# Patient Record
Sex: FEMALE | Race: WHITE | Employment: PART TIME | URBAN - METROPOLITAN AREA
[De-identification: names, ages, dates, MRNs, and addresses within clinical notes are randomized per-mention and may not be internally consistent; named-entity substitution may affect disease eponyms.]

---

## 2017-08-17 ENCOUNTER — HOSPITAL ENCOUNTER (EMERGENCY)
Age: 57
Discharge: HOME | End: 2017-08-17
Attending: EMERGENCY MEDICINE | Admitting: EMERGENCY MEDICINE
Payer: COMMERCIAL

## 2017-08-17 ENCOUNTER — APPOINTMENT (OUTPATIENT)
Dept: GENERAL RADIOLOGY | Age: 57
End: 2017-08-17
Attending: EMERGENCY MEDICINE
Payer: COMMERCIAL

## 2017-08-17 VITALS
DIASTOLIC BLOOD PRESSURE: 74 MMHG | HEIGHT: 62 IN | OXYGEN SATURATION: 100 % | BODY MASS INDEX: 23.92 KG/M2 | SYSTOLIC BLOOD PRESSURE: 123 MMHG | WEIGHT: 130 LBS | TEMPERATURE: 98.5 F | HEART RATE: 93 BPM | RESPIRATION RATE: 18 BRPM

## 2017-08-17 DIAGNOSIS — J20.9 ACUTE BRONCHITIS, UNSPECIFIED ORGANISM: Primary | ICD-10-CM

## 2017-08-17 LAB
ALBUMIN SERPL-MCNC: 3.6 G/DL (ref 3.5–5)
ALBUMIN/GLOB SERPL: 1 {RATIO} (ref 1.1–2.2)
ALP SERPL-CCNC: 104 U/L (ref 45–117)
ALT SERPL-CCNC: 19 U/L (ref 12–78)
ANION GAP SERPL CALC-SCNC: 9 MMOL/L (ref 5–15)
AST SERPL-CCNC: 16 U/L (ref 15–37)
BASOPHILS # BLD: 0.1 K/UL (ref 0–0.1)
BASOPHILS NFR BLD: 1 % (ref 0–1)
BILIRUB SERPL-MCNC: 0.2 MG/DL (ref 0.2–1)
BUN SERPL-MCNC: 15 MG/DL (ref 6–20)
BUN/CREAT SERPL: 16 (ref 12–20)
CALCIUM SERPL-MCNC: 8.8 MG/DL (ref 8.5–10.1)
CHLORIDE SERPL-SCNC: 103 MMOL/L (ref 97–108)
CO2 SERPL-SCNC: 27 MMOL/L (ref 21–32)
CREAT SERPL-MCNC: 0.94 MG/DL (ref 0.55–1.02)
EOSINOPHIL # BLD: 0.2 K/UL (ref 0–0.4)
EOSINOPHIL NFR BLD: 2 % (ref 0–7)
ERYTHROCYTE [DISTWIDTH] IN BLOOD BY AUTOMATED COUNT: 13.5 % (ref 11.5–14.5)
GLOBULIN SER CALC-MCNC: 3.5 G/DL (ref 2–4)
GLUCOSE SERPL-MCNC: 123 MG/DL (ref 65–100)
HCT VFR BLD AUTO: 41.3 % (ref 35–47)
HGB BLD-MCNC: 14.2 G/DL (ref 11.5–16)
LYMPHOCYTES # BLD: 2.6 K/UL (ref 0.8–3.5)
LYMPHOCYTES NFR BLD: 35 % (ref 12–49)
MCH RBC QN AUTO: 29.3 PG (ref 26–34)
MCHC RBC AUTO-ENTMCNC: 34.4 G/DL (ref 30–36.5)
MCV RBC AUTO: 85.3 FL (ref 80–99)
MONOCYTES # BLD: 0.7 K/UL (ref 0–1)
MONOCYTES NFR BLD: 9 % (ref 5–13)
NEUTS SEG # BLD: 3.9 K/UL (ref 1.8–8)
NEUTS SEG NFR BLD: 53 % (ref 32–75)
PLATELET # BLD AUTO: 246 K/UL (ref 150–400)
PLATELET COMMENTS,PCOM: NORMAL
POTASSIUM SERPL-SCNC: 3.5 MMOL/L (ref 3.5–5.1)
PROT SERPL-MCNC: 7.1 G/DL (ref 6.4–8.2)
RBC # BLD AUTO: 4.84 M/UL (ref 3.8–5.2)
RBC MORPH BLD: NORMAL
SODIUM SERPL-SCNC: 139 MMOL/L (ref 136–145)
WBC # BLD AUTO: 7.5 K/UL (ref 3.6–11)
WBC MORPH BLD: NORMAL

## 2017-08-17 PROCEDURE — 74011250636 HC RX REV CODE- 250/636: Performed by: EMERGENCY MEDICINE

## 2017-08-17 PROCEDURE — 99285 EMERGENCY DEPT VISIT HI MDM: CPT

## 2017-08-17 PROCEDURE — 94640 AIRWAY INHALATION TREATMENT: CPT

## 2017-08-17 PROCEDURE — 74011250637 HC RX REV CODE- 250/637: Performed by: EMERGENCY MEDICINE

## 2017-08-17 PROCEDURE — 93005 ELECTROCARDIOGRAM TRACING: CPT

## 2017-08-17 PROCEDURE — 96361 HYDRATE IV INFUSION ADD-ON: CPT

## 2017-08-17 PROCEDURE — 85025 COMPLETE CBC W/AUTO DIFF WBC: CPT | Performed by: EMERGENCY MEDICINE

## 2017-08-17 PROCEDURE — 96374 THER/PROPH/DIAG INJ IV PUSH: CPT

## 2017-08-17 PROCEDURE — 80053 COMPREHEN METABOLIC PANEL: CPT | Performed by: EMERGENCY MEDICINE

## 2017-08-17 PROCEDURE — 36415 COLL VENOUS BLD VENIPUNCTURE: CPT | Performed by: EMERGENCY MEDICINE

## 2017-08-17 PROCEDURE — 77030013140 HC MSK NEB VYRM -A

## 2017-08-17 PROCEDURE — 71020 XR CHEST PA LAT: CPT

## 2017-08-17 PROCEDURE — 74011000250 HC RX REV CODE- 250: Performed by: EMERGENCY MEDICINE

## 2017-08-17 RX ORDER — BENZONATATE 100 MG/1
100 CAPSULE ORAL
Qty: 21 CAP | Refills: 0 | Status: SHIPPED | OUTPATIENT
Start: 2017-08-17 | End: 2017-08-23

## 2017-08-17 RX ORDER — GUAIFENESIN 100 MG/5ML
200 SOLUTION ORAL
Status: COMPLETED | OUTPATIENT
Start: 2017-08-17 | End: 2017-08-17

## 2017-08-17 RX ORDER — PREDNISONE 20 MG/1
60 TABLET ORAL DAILY
Qty: 15 TAB | Refills: 0 | Status: SHIPPED | OUTPATIENT
Start: 2017-08-17 | End: 2017-08-23

## 2017-08-17 RX ORDER — METHYLPREDNISOLONE 4 MG/1
TABLET ORAL
COMMUNITY
End: 2017-08-17

## 2017-08-17 RX ORDER — IPRATROPIUM BROMIDE AND ALBUTEROL SULFATE 2.5; .5 MG/3ML; MG/3ML
3 SOLUTION RESPIRATORY (INHALATION)
Status: COMPLETED | OUTPATIENT
Start: 2017-08-17 | End: 2017-08-17

## 2017-08-17 RX ORDER — ALBUTEROL SULFATE 90 UG/1
2 AEROSOL, METERED RESPIRATORY (INHALATION)
Qty: 1 INHALER | Refills: 0 | Status: SHIPPED | OUTPATIENT
Start: 2017-08-17

## 2017-08-17 RX ORDER — ALBUTEROL SULFATE 0.83 MG/ML
SOLUTION RESPIRATORY (INHALATION) ONCE
COMMUNITY
End: 2017-08-20

## 2017-08-17 RX ADMIN — GUAIFENESIN 200 MG: 100 SOLUTION ORAL at 16:56

## 2017-08-17 RX ADMIN — IPRATROPIUM BROMIDE AND ALBUTEROL SULFATE 3 ML: .5; 3 SOLUTION RESPIRATORY (INHALATION) at 17:34

## 2017-08-17 RX ADMIN — IPRATROPIUM BROMIDE AND ALBUTEROL SULFATE 3 ML: .5; 3 SOLUTION RESPIRATORY (INHALATION) at 16:56

## 2017-08-17 RX ADMIN — METHYLPREDNISOLONE SODIUM SUCCINATE 125 MG: 125 INJECTION, POWDER, FOR SOLUTION INTRAMUSCULAR; INTRAVENOUS at 16:56

## 2017-08-17 RX ADMIN — SODIUM CHLORIDE 1000 ML: 900 INJECTION, SOLUTION INTRAVENOUS at 17:24

## 2017-08-17 NOTE — ED PROVIDER NOTES
HPI Comments: 60 y.o. female with past medical history significant for anxiety and depression who presents ambulatory from home with chief complaint of cough. Pt reports 10-day history of worsening SOB and unproductive cough with intermittent fevers. Pt states cough and SOB are worse outside. Pt states her throat is sore secondary to her cough. Pt's highest documented fever was 101. Pt was evaluated twice by her PCP who initially started her on a z-garrison that she completed and most recently prescribed a Medrol dose pack 3 days ago. Pt has been compliant with the Medrol dose pack in addition to her rescue inhaler. Pt used her inhaler 90 minutes prior to arrival. Of note, pt was informed today that her chest x-ray this week was normal (x-ray performed at an outside facility whose records are not available). 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 Pt admits to increased stress at home related to her  leaving her and looming eviction. Pt notes her current place of residence is \"kept at 80 degrees which makes it difficult to breathe\". Pt has local family and friends she states she can stay with in a worst case scenario. Pt denies history of recent admission, PE/DVT, recent long travel, and CHF. Pertinent negatives include leg swelling. There are no other acute medical concerns at this time. Social hx: current every day tobacco smoker; uses EtOH rarely  PCP: Linda Yip MD    Note written by Orlando Johnson, as dictated by Chyna Villeda MD 4:40 PM        The history is provided by the patient. Past Medical History:   Diagnosis Date    Anxiety     Depression        Past Surgical History:   Procedure Laterality Date    HX  SECTION      HX OTHER SURGICAL      knee surgery          No family history on file.     Social History     Social History    Marital status: UNKNOWN     Spouse name: N/A    Number of children: N/A    Years of education: N/A     Occupational History    Not on file. Social History Main Topics    Smoking status: Current Every Day Smoker     Packs/day: 0.50    Smokeless tobacco: Not on file    Alcohol use Yes      Comment: once a month    Drug use: Not on file    Sexual activity: Not on file     Other Topics Concern    Not on file     Social History Narrative    60year old   female admitted for rage episodes and depressed mood and severe intepersonal sensitivity. Pt. Has been on Ativan 1 mg po rip prn, which is probably contributing to her mood, poor anger mangement and anxiety. Pt now lives in a rented room after her marriaGE BROKE UP. sHE IS UNEMPLOYED. ALLERGIES: Vicodin [hydrocodone-acetaminophen]    Review of Systems   Constitutional: Negative for fever. Respiratory: Positive for cough and shortness of breath. Cardiovascular: Negative for chest pain and leg swelling. Psychiatric/Behavioral: The patient is nervous/anxious. All other systems reviewed and are negative. There were no vitals filed for this visit. Physical Exam   Constitutional: She is oriented to person, place, and time. She appears well-developed and well-nourished. No distress. HENT:   Head: Normocephalic and atraumatic. Eyes: Conjunctivae are normal.   Neck: Normal range of motion. Cardiovascular: Regular rhythm, normal heart sounds and intact distal pulses. Exam reveals no friction rub. No murmur heard. tachycardic   Pulmonary/Chest: Effort normal. No respiratory distress. She has wheezes. She has no rales. She exhibits no tenderness. Scant inspiratory and expiratory wheezes b/l; + bronchospastic cough   Abdominal: Soft. Bowel sounds are normal. She exhibits no distension. There is no tenderness. There is no rebound and no guarding. Musculoskeletal: Normal range of motion. She exhibits no edema or tenderness. Neurological: She is alert and oriented to person, place, and time.  Coordination normal.   Skin: Skin is warm and dry. She is not diaphoretic. No pallor. Psychiatric:   Anxious, tearful   Nursing note and vitals reviewed. MDM  Number of Diagnoses or Management Options  Acute bronchitis, unspecified organism:   Diagnosis management comments: Cough bronchospasm- pna vs bronchitis advised smoking cessation no risk factor for dvt or PE       Amount and/or Complexity of Data Reviewed  Clinical lab tests: ordered and reviewed  Tests in the radiology section of CPT®: ordered and reviewed  Independent visualization of images, tracings, or specimens: yes (ekg)    Patient Progress  Patient progress: stable    ED Course       Procedures    EKG interpretation: (Preliminary)  Rhythm: sinus tachycardia; and regular . Rate (approx.): 115; Axis: normal; P wave: normal; QRS interval: normal ; ST/T wave: non-specific changes;      5:31 PM  Reassessed patient. Patient is awake, alert and on her iPad. She reports minimal improvement with Duo-Neb, solu-medrol, and robitussin. Feeling slightly better. Still with mild bronchospasm. Pt improved able to speak in full sentences with no distress though does appear anxious. Higher dose steroids to halt inflammation and follow up with pc. Per pt pcp wanted her to follow up with pulmonary and she is supposed to be getting an appointment. She is in no distress.  Advised smoking cessation and return if worsening sx

## 2017-08-17 NOTE — ED NOTES
Pt stated she is under a lot of stress d/t her  leaving her and her landlord is kicking her out of her rental.

## 2017-08-17 NOTE — ED NOTES
Pt \"cant breath, coughing\" started last week and went to her PCP and was placed on a z pack and prednisone. Pt c/o SOB and non productive cough 1.5 weeks. Also c/o fever on and off. She had a chest xray today and was told it was clear.

## 2017-08-17 NOTE — DISCHARGE INSTRUCTIONS
We hope that we have addressed all of your medical concerns. The examination and treatment you received in the Emergency Department were for an emergent problem and were not intended as complete care. It is important that you follow up with your healthcare provider(s) for ongoing care. If your symptoms worsen or do not improve as expected, and you are unable to reach your usual health care provider(s), you should return to the Emergency Department. Today's healthcare is undergoing tremendous change, and patient satisfaction surveys are one of the many tools to assess the quality of medical care. You may receive a survey from the [employer] regarding your experience in the Emergency Department. I hope that your experience has been completely positive, particularly the medical care that I provided. As such, please participate in the survey; anything less than excellent does not meet my expectations or intentions. [facility] and [facility] participate in nationally recognized quality of care measures. If your blood pressure is greater than 120/80, as reported below, we urge that you seek medical care to address the potential of high blood pressure, commonly known as hypertension. Hypertension can be hereditary or can be caused by certain medical conditions, pain, stress, or \"white coat syndrome. \"       Please make an appointment with your health care provider(s) for follow up of your Emergency Department visit. VITALS:   Patient Vitals for the past 8 hrs:   Temp Pulse Resp BP SpO2   08/17/17 1715 - (!) 112 18 - 96 %   08/17/17 1659 - (!) 103 14 - 99 %   08/17/17 1654 - (!) 115 20 - 95 %   08/17/17 1646 98.5 °F (36.9 °C) (!) 117 14 151/87 98 %          Thank you for allowing us to provide you with medical care today. We realize that you have many choices for your emergency care needs.   Please choose us in the future for any continued health care needs. Aspen Connell MD    [facility].   Office: [phone]            Recent Results (from the past 24 hour(s))   CBC WITH AUTOMATED DIFF    Collection Time: 08/17/17  4:56 PM   Result Value Ref Range    WBC 7.5 3.6 - 11.0 K/uL    RBC 4.84 3.80 - 5.20 M/uL    HGB 14.2 11.5 - 16.0 g/dL    HCT 41.3 35.0 - 47.0 %    MCV 85.3 80.0 - 99.0 FL    MCH 29.3 26.0 - 34.0 PG    MCHC 34.4 30.0 - 36.5 g/dL    RDW 13.5 11.5 - 14.5 %    PLATELET 821 441 - 713 K/uL    NEUTROPHILS PENDING %    LYMPHOCYTES PENDING %    MONOCYTES PENDING %    EOSINOPHILS PENDING %    BASOPHILS PENDING %    ABS. NEUTROPHILS PENDING K/UL    ABS. LYMPHOCYTES PENDING K/UL    ABS. MONOCYTES PENDING K/UL    ABS. EOSINOPHILS PENDING K/UL    ABS. BASOPHILS PENDING K/UL    DF PENDING    METABOLIC PANEL, COMPREHENSIVE    Collection Time: 08/17/17  4:56 PM   Result Value Ref Range    Sodium 139 136 - 145 mmol/L    Potassium 3.5 3.5 - 5.1 mmol/L    Chloride 103 97 - 108 mmol/L    CO2 27 21 - 32 mmol/L    Anion gap 9 5 - 15 mmol/L    Glucose 123 (H) 65 - 100 mg/dL    BUN 15 6 - 20 MG/DL    Creatinine 0.94 0.55 - 1.02 MG/DL    BUN/Creatinine ratio 16 12 - 20      GFR est AA >60 >60 ml/min/1.73m2    GFR est non-AA >60 >60 ml/min/1.73m2    Calcium 8.8 8.5 - 10.1 MG/DL    Bilirubin, total 0.2 0.2 - 1.0 MG/DL    ALT (SGPT) 19 12 - 78 U/L    AST (SGOT) 16 15 - 37 U/L    Alk. phosphatase 104 45 - 117 U/L    Protein, total 7.1 6.4 - 8.2 g/dL    Albumin 3.6 3.5 - 5.0 g/dL    Globulin 3.5 2.0 - 4.0 g/dL    A-G Ratio 1.0 (L) 1.1 - 2.2         Xr Chest Pa Lat    Result Date: 8/17/2017  CLINICAL HISTORY: Cough and dyspnea INDICATION: Cough COMPARISON: 2007 FINDINGS: PA and lateral views of the chest are obtained. The cardiopericardial silhouette is within normal limits. There is no pleural effusion, pneumothorax or focal consolidation present. IMPRESSION: No acute intrathoracic disease.           Bronchitis: Care Instructions  Your Care Instructions    Bronchitis is inflammation of the bronchial tubes, which carry air to the lungs. The tubes swell and produce mucus, or phlegm. The mucus and inflamed bronchial tubes make you cough. You may have trouble breathing. Most cases of bronchitis are caused by viruses like those that cause colds. Antibiotics usually do not help and they may be harmful. Bronchitis usually develops rapidly and lasts about 2 to 3 weeks in otherwise healthy people. Follow-up care is a key part of your treatment and safety. Be sure to make and go to all appointments, and call your doctor if you are having problems. It's also a good idea to know your test results and keep a list of the medicines you take. How can you care for yourself at home? · Take all medicines exactly as prescribed. Call your doctor if you think you are having a problem with your medicine. · Get some extra rest.  · Take an over-the-counter pain medicine, such as acetaminophen (Tylenol), ibuprofen (Advil, Motrin), or naproxen (Aleve) to reduce fever and relieve body aches. Read and follow all instructions on the label. · Do not take two or more pain medicines at the same time unless the doctor told you to. Many pain medicines have acetaminophen, which is Tylenol. Too much acetaminophen (Tylenol) can be harmful. · Take an over-the-counter cough medicine that contains dextromethorphan to help quiet a dry, hacking cough so that you can sleep. Avoid cough medicines that have more than one active ingredient. Read and follow all instructions on the label. · Breathe moist air from a humidifier, hot shower, or sink filled with hot water. The heat and moisture will thin mucus so you can cough it out. · Do not smoke. Smoking can make bronchitis worse. If you need help quitting, talk to your doctor about stop-smoking programs and medicines. These can increase your chances of quitting for good. When should you call for help?   Call 911 anytime you think you may need emergency care. For example, call if:  · You have severe trouble breathing. Call your doctor now or seek immediate medical care if:  · You have new or worse trouble breathing. · You cough up dark brown or bloody mucus (sputum). · You have a new or higher fever. · You have a new rash. Watch closely for changes in your health, and be sure to contact your doctor if:  · You cough more deeply or more often, especially if you notice more mucus or a change in the color of your mucus. · You are not getting better as expected. Where can you learn more? Go to http://elizabeth-ju.info/. Enter H333 in the search box to learn more about \"Bronchitis: Care Instructions. \"  Current as of: March 25, 2017  Content Version: 11.3  © 6028-1384 [employer]. Care instructions adapted under license by [employer] (which disclaims liability or warranty for this information). If you have questions about a medical condition or this instruction, always ask your healthcare professional. [address] any warranty or liability for your use of this information.

## 2017-08-17 NOTE — ED NOTES
Patient taken directly back to room 6 for EKG based on chief complaint. Primary nurse aware of patient's arrival to exam room.

## 2017-08-19 LAB
ATRIAL RATE: 115 BPM
CALCULATED P AXIS, ECG09: 68 DEGREES
CALCULATED R AXIS, ECG10: 46 DEGREES
CALCULATED T AXIS, ECG11: 63 DEGREES
DIAGNOSIS, 93000: NORMAL
P-R INTERVAL, ECG05: 172 MS
Q-T INTERVAL, ECG07: 318 MS
QRS DURATION, ECG06: 76 MS
QTC CALCULATION (BEZET), ECG08: 439 MS
VENTRICULAR RATE, ECG03: 115 BPM

## 2017-08-20 ENCOUNTER — HOSPITAL ENCOUNTER (INPATIENT)
Age: 57
LOS: 3 days | Discharge: HOME HEALTH | DRG: 190 | End: 2017-08-23
Attending: EMERGENCY MEDICINE | Admitting: INTERNAL MEDICINE
Payer: COMMERCIAL

## 2017-08-20 ENCOUNTER — APPOINTMENT (OUTPATIENT)
Dept: GENERAL RADIOLOGY | Age: 57
DRG: 190 | End: 2017-08-20
Attending: EMERGENCY MEDICINE
Payer: COMMERCIAL

## 2017-08-20 DIAGNOSIS — J44.1 ACUTE EXACERBATION OF CHRONIC OBSTRUCTIVE PULMONARY DISEASE (COPD) (HCC): Primary | ICD-10-CM

## 2017-08-20 PROBLEM — J44.9 COPD (CHRONIC OBSTRUCTIVE PULMONARY DISEASE) (HCC): Status: ACTIVE | Noted: 2017-08-20

## 2017-08-20 PROBLEM — E78.5 HYPERLIPIDEMIA: Status: ACTIVE | Noted: 2017-08-20

## 2017-08-20 LAB
ANION GAP SERPL CALC-SCNC: 9 MMOL/L (ref 5–15)
ARTERIAL PATENCY WRIST A: YES
BASE EXCESS BLDA CALC-SCNC: 1.9 MMOL/L
BASOPHILS # BLD: 0 K/UL (ref 0–0.1)
BASOPHILS NFR BLD: 0 % (ref 0–1)
BDY SITE: ABNORMAL
BUN SERPL-MCNC: 13 MG/DL (ref 6–20)
BUN/CREAT SERPL: 16 (ref 12–20)
CALCIUM SERPL-MCNC: 9.2 MG/DL (ref 8.5–10.1)
CHLORIDE SERPL-SCNC: 105 MMOL/L (ref 97–108)
CO2 SERPL-SCNC: 28 MMOL/L (ref 21–32)
CREAT SERPL-MCNC: 0.81 MG/DL (ref 0.55–1.02)
EOSINOPHIL # BLD: 0 K/UL (ref 0–0.4)
EOSINOPHIL NFR BLD: 0 % (ref 0–7)
ERYTHROCYTE [DISTWIDTH] IN BLOOD BY AUTOMATED COUNT: 13.4 % (ref 11.5–14.5)
FIO2 ON VENT: 21 %
GLUCOSE SERPL-MCNC: 114 MG/DL (ref 65–100)
HCO3 BLDA-SCNC: 24 MMOL/L (ref 22–26)
HCT VFR BLD AUTO: 43.4 % (ref 35–47)
HGB BLD-MCNC: 14.7 G/DL (ref 11.5–16)
LYMPHOCYTES # BLD: 1.2 K/UL (ref 0.8–3.5)
LYMPHOCYTES NFR BLD: 13 % (ref 12–49)
MCH RBC QN AUTO: 28.9 PG (ref 26–34)
MCHC RBC AUTO-ENTMCNC: 33.9 G/DL (ref 30–36.5)
MCV RBC AUTO: 85.3 FL (ref 80–99)
MONOCYTES # BLD: 0.7 K/UL (ref 0–1)
MONOCYTES NFR BLD: 7 % (ref 5–13)
NEUTS SEG # BLD: 7.8 K/UL (ref 1.8–8)
NEUTS SEG NFR BLD: 80 % (ref 32–75)
PCO2 BLDA: 32 MMHG (ref 35–45)
PH BLDA: 7.5 [PH] (ref 7.35–7.45)
PLATELET # BLD AUTO: 303 K/UL (ref 150–400)
PO2 BLDA: 62 MMHG (ref 80–100)
POTASSIUM SERPL-SCNC: 3.7 MMOL/L (ref 3.5–5.1)
RBC # BLD AUTO: 5.09 M/UL (ref 3.8–5.2)
SAO2 % BLD: 94 % (ref 92–97)
SAO2% DEVICE SAO2% SENSOR NAME: ABNORMAL
SERVICE CMNT-IMP: ABNORMAL
SODIUM SERPL-SCNC: 142 MMOL/L (ref 136–145)
SPECIMEN SITE: ABNORMAL
WBC # BLD AUTO: 9.7 K/UL (ref 3.6–11)

## 2017-08-20 PROCEDURE — 65270000029 HC RM PRIVATE

## 2017-08-20 PROCEDURE — 77030029684 HC NEB SM VOL KT MONA -A

## 2017-08-20 PROCEDURE — 74011250636 HC RX REV CODE- 250/636: Performed by: INTERNAL MEDICINE

## 2017-08-20 PROCEDURE — 74011250637 HC RX REV CODE- 250/637: Performed by: EMERGENCY MEDICINE

## 2017-08-20 PROCEDURE — 93005 ELECTROCARDIOGRAM TRACING: CPT

## 2017-08-20 PROCEDURE — 77030013140 HC MSK NEB VYRM -A

## 2017-08-20 PROCEDURE — 36415 COLL VENOUS BLD VENIPUNCTURE: CPT | Performed by: EMERGENCY MEDICINE

## 2017-08-20 PROCEDURE — 99284 EMERGENCY DEPT VISIT MOD MDM: CPT

## 2017-08-20 PROCEDURE — 36600 WITHDRAWAL OF ARTERIAL BLOOD: CPT | Performed by: EMERGENCY MEDICINE

## 2017-08-20 PROCEDURE — 94640 AIRWAY INHALATION TREATMENT: CPT

## 2017-08-20 PROCEDURE — 74011250637 HC RX REV CODE- 250/637: Performed by: INTERNAL MEDICINE

## 2017-08-20 PROCEDURE — 74011000250 HC RX REV CODE- 250: Performed by: INTERNAL MEDICINE

## 2017-08-20 PROCEDURE — 96375 TX/PRO/DX INJ NEW DRUG ADDON: CPT

## 2017-08-20 PROCEDURE — 96365 THER/PROPH/DIAG IV INF INIT: CPT

## 2017-08-20 PROCEDURE — 74011250636 HC RX REV CODE- 250/636: Performed by: EMERGENCY MEDICINE

## 2017-08-20 PROCEDURE — 71010 XR CHEST PORT: CPT

## 2017-08-20 PROCEDURE — 80048 BASIC METABOLIC PNL TOTAL CA: CPT | Performed by: EMERGENCY MEDICINE

## 2017-08-20 PROCEDURE — 74011000250 HC RX REV CODE- 250: Performed by: EMERGENCY MEDICINE

## 2017-08-20 PROCEDURE — 82803 BLOOD GASES ANY COMBINATION: CPT | Performed by: EMERGENCY MEDICINE

## 2017-08-20 PROCEDURE — 85025 COMPLETE CBC W/AUTO DIFF WBC: CPT | Performed by: EMERGENCY MEDICINE

## 2017-08-20 RX ORDER — MAGNESIUM SULFATE HEPTAHYDRATE 40 MG/ML
2 INJECTION, SOLUTION INTRAVENOUS
Status: COMPLETED | OUTPATIENT
Start: 2017-08-20 | End: 2017-08-20

## 2017-08-20 RX ORDER — GUAIFENESIN 600 MG/1
600 TABLET, EXTENDED RELEASE ORAL 2 TIMES DAILY
Status: DISCONTINUED | OUTPATIENT
Start: 2017-08-20 | End: 2017-08-23 | Stop reason: HOSPADM

## 2017-08-20 RX ORDER — ALBUTEROL SULFATE 0.83 MG/ML
2.5 SOLUTION RESPIRATORY (INHALATION)
Status: DISCONTINUED | OUTPATIENT
Start: 2017-08-20 | End: 2017-08-23 | Stop reason: HOSPADM

## 2017-08-20 RX ORDER — IBUPROFEN 200 MG
1 TABLET ORAL DAILY
Status: DISCONTINUED | OUTPATIENT
Start: 2017-08-21 | End: 2017-08-23 | Stop reason: HOSPADM

## 2017-08-20 RX ORDER — FLUTICASONE FUROATE AND VILANTEROL 100; 25 UG/1; UG/1
1 POWDER RESPIRATORY (INHALATION) DAILY
Status: DISCONTINUED | OUTPATIENT
Start: 2017-08-20 | End: 2017-08-23 | Stop reason: HOSPADM

## 2017-08-20 RX ORDER — IPRATROPIUM BROMIDE AND ALBUTEROL SULFATE 2.5; .5 MG/3ML; MG/3ML
3 SOLUTION RESPIRATORY (INHALATION)
Status: DISCONTINUED | OUTPATIENT
Start: 2017-08-20 | End: 2017-08-23 | Stop reason: HOSPADM

## 2017-08-20 RX ORDER — BENZONATATE 100 MG/1
100 CAPSULE ORAL
Status: DISCONTINUED | OUTPATIENT
Start: 2017-08-20 | End: 2017-08-23 | Stop reason: HOSPADM

## 2017-08-20 RX ORDER — IPRATROPIUM BROMIDE AND ALBUTEROL SULFATE 2.5; .5 MG/3ML; MG/3ML
3 SOLUTION RESPIRATORY (INHALATION)
Status: COMPLETED | OUTPATIENT
Start: 2017-08-20 | End: 2017-08-20

## 2017-08-20 RX ORDER — ZOLPIDEM TARTRATE 5 MG/1
5 TABLET ORAL
Status: DISCONTINUED | OUTPATIENT
Start: 2017-08-20 | End: 2017-08-23 | Stop reason: HOSPADM

## 2017-08-20 RX ORDER — SODIUM CHLORIDE 0.9 % (FLUSH) 0.9 %
5-10 SYRINGE (ML) INJECTION EVERY 8 HOURS
Status: DISCONTINUED | OUTPATIENT
Start: 2017-08-20 | End: 2017-08-23 | Stop reason: HOSPADM

## 2017-08-20 RX ORDER — IBUPROFEN 600 MG/1
600 TABLET ORAL
Status: COMPLETED | OUTPATIENT
Start: 2017-08-20 | End: 2017-08-20

## 2017-08-20 RX ORDER — DEXAMETHASONE SODIUM PHOSPHATE 10 MG/ML
10 INJECTION INTRAMUSCULAR; INTRAVENOUS
Status: COMPLETED | OUTPATIENT
Start: 2017-08-20 | End: 2017-08-20

## 2017-08-20 RX ORDER — CODEINE PHOSPHATE AND GUAIFENESIN 10; 100 MG/5ML; MG/5ML
5 SOLUTION ORAL
Status: DISCONTINUED | OUTPATIENT
Start: 2017-08-20 | End: 2017-08-21

## 2017-08-20 RX ORDER — IBUPROFEN 400 MG/1
400 TABLET ORAL
Status: DISCONTINUED | OUTPATIENT
Start: 2017-08-20 | End: 2017-08-23 | Stop reason: HOSPADM

## 2017-08-20 RX ORDER — SODIUM CHLORIDE 0.9 % (FLUSH) 0.9 %
5-10 SYRINGE (ML) INJECTION AS NEEDED
Status: DISCONTINUED | OUTPATIENT
Start: 2017-08-20 | End: 2017-08-23 | Stop reason: HOSPADM

## 2017-08-20 RX ORDER — ENOXAPARIN SODIUM 100 MG/ML
40 INJECTION SUBCUTANEOUS DAILY
Status: DISCONTINUED | OUTPATIENT
Start: 2017-08-20 | End: 2017-08-23 | Stop reason: HOSPADM

## 2017-08-20 RX ADMIN — METHYLPREDNISOLONE SODIUM SUCCINATE 40 MG: 40 INJECTION, POWDER, FOR SOLUTION INTRAMUSCULAR; INTRAVENOUS at 20:07

## 2017-08-20 RX ADMIN — IPRATROPIUM BROMIDE AND ALBUTEROL SULFATE 3 ML: .5; 3 SOLUTION RESPIRATORY (INHALATION) at 11:39

## 2017-08-20 RX ADMIN — Medication 10 ML: at 14:06

## 2017-08-20 RX ADMIN — SODIUM CHLORIDE 1000 ML: 900 INJECTION, SOLUTION INTRAVENOUS at 10:28

## 2017-08-20 RX ADMIN — IBUPROFEN 600 MG: 600 TABLET, FILM COATED ORAL at 10:43

## 2017-08-20 RX ADMIN — Medication 10 ML: at 20:11

## 2017-08-20 RX ADMIN — GUAIFENESIN AND CODEINE PHOSPHATE 5 ML: 100; 10 SOLUTION ORAL at 21:11

## 2017-08-20 RX ADMIN — IBUPROFEN 400 MG: 400 TABLET, FILM COATED ORAL at 20:45

## 2017-08-20 RX ADMIN — GUAIFENESIN 600 MG: 600 TABLET, EXTENDED RELEASE ORAL at 20:06

## 2017-08-20 RX ADMIN — DEXAMETHASONE SODIUM PHOSPHATE 10 MG: 10 INJECTION, SOLUTION INTRAMUSCULAR; INTRAVENOUS at 10:26

## 2017-08-20 RX ADMIN — IPRATROPIUM BROMIDE AND ALBUTEROL SULFATE 3 ML: .5; 3 SOLUTION RESPIRATORY (INHALATION) at 19:42

## 2017-08-20 RX ADMIN — IPRATROPIUM BROMIDE AND ALBUTEROL SULFATE 3 ML: .5; 3 SOLUTION RESPIRATORY (INHALATION) at 10:26

## 2017-08-20 RX ADMIN — METHYLPREDNISOLONE SODIUM SUCCINATE 40 MG: 40 INJECTION, POWDER, FOR SOLUTION INTRAMUSCULAR; INTRAVENOUS at 14:07

## 2017-08-20 RX ADMIN — MAGNESIUM SULFATE HEPTAHYDRATE 2 G: 40 INJECTION, SOLUTION INTRAVENOUS at 10:26

## 2017-08-20 RX ADMIN — FLUTICASONE FUROATE AND VILANTEROL TRIFENATATE 1 PUFF: 100; 25 POWDER RESPIRATORY (INHALATION) at 15:24

## 2017-08-20 RX ADMIN — ENOXAPARIN SODIUM 40 MG: 40 INJECTION SUBCUTANEOUS at 14:06

## 2017-08-20 RX ADMIN — IPRATROPIUM BROMIDE AND ALBUTEROL SULFATE 3 ML: .5; 3 SOLUTION RESPIRATORY (INHALATION) at 15:30

## 2017-08-20 RX ADMIN — GUAIFENESIN 600 MG: 600 TABLET, EXTENDED RELEASE ORAL at 14:07

## 2017-08-20 NOTE — ED PROVIDER NOTES
HPI Comments: 60 y.o. female with past medical history significant for anxiety, depression, diverticulitis, and hyperlipidemia who presents from home with chief complaint of shortness of breath. Patient was seen at [facility] on 17 for shortness of breath and received labs and a chest x-ray which were unremarkable. She was discharged with prednisone, albuterol, and tessalon perles. Patient complains of continued SOB which worsens upon exertion, cough without production, chest tightness, and wheezing. She states that her wheezing subsides after taking her albuterol but complains that it starts developing again 2-4 hours later. Patient denies having any chest pain or fever. She has a long history of smoking. Her brother  from COPD last year. There are no other acute medical concerns at this time. Social hx: everyday smoker, no EtOH use, no drug use. PCP: Jessa Gilbert MD    Note written by Orlando Baker, as dictated by Cristy Hickey MD 10:25 AM      The history is provided by the patient. No  was used. Past Medical History:   Diagnosis Date    Anxiety     Depression     Diverticulitis     Hyperlipemia        Past Surgical History:   Procedure Laterality Date    HX  SECTION      HX HEART CATHETERIZATION  2013    HX OTHER SURGICAL      knee surgery          History reviewed. No pertinent family history. Social History     Social History    Marital status: UNKNOWN     Spouse name: N/A    Number of children: N/A    Years of education: N/A     Occupational History    Not on file.      Social History Main Topics    Smoking status: Current Every Day Smoker     Packs/day: 0.50    Smokeless tobacco: Never Used    Alcohol use No    Drug use: No    Sexual activity: Not on file     Other Topics Concern    Not on file     Social History Narrative    60year old   female admitted for rage episodes and depressed mood and severe intepersonal sensitivity. Pt. Has been on Ativan 1 mg po rip prn, which is probably contributing to her mood, poor anger mangement and anxiety. Pt now lives in a rented room after her marriaGE BROKE UP. sHE IS UNEMPLOYED. ALLERGIES: Vicodin [hydrocodone-acetaminophen]    Review of Systems   Constitutional: Negative for chills and fever. HENT: Negative for ear pain and sore throat. Eyes: Negative for photophobia and pain. Respiratory: Positive for cough, chest tightness, shortness of breath and wheezing. Cardiovascular: Negative for chest pain and leg swelling. Gastrointestinal: Negative for abdominal pain, nausea and vomiting. Genitourinary: Negative for dysuria and flank pain. Musculoskeletal: Negative for back pain and neck pain. Skin: Negative for rash and wound. Neurological: Negative for dizziness, light-headedness and headaches. All other systems reviewed and are negative. Vitals:    08/20/17 1003   BP: (!) 168/139   Pulse: (!) 109   Resp: 15   Temp: 98.2 °F (36.8 °C)   SpO2: 96%   Weight: 59 kg (130 lb)   Height: 5' 2\" (1.575 m)            Physical Exam   Constitutional: She is oriented to person, place, and time. She appears well-developed and well-nourished. She appears distressed. HENT:   Head: Normocephalic and atraumatic. Eyes: Conjunctivae and EOM are normal. Pupils are equal, round, and reactive to light. Neck: Normal range of motion. Cardiovascular: Regular rhythm, normal heart sounds and intact distal pulses. Tachycardia present. No murmur heard. Pulmonary/Chest: No stridor. She is in respiratory distress. She has wheezes. Abdominal: Soft. Bowel sounds are normal. There is no tenderness. Musculoskeletal: Normal range of motion. She exhibits no edema or tenderness. Neurological: She is alert and oriented to person, place, and time. No cranial nerve deficit. Skin: Skin is warm and dry. She is not diaphoretic.    Psychiatric: She has a normal mood and affect. Nursing note and vitals reviewed. MDM  Number of Diagnoses or Management Options  Acute exacerbation of chronic obstructive pulmonary disease (COPD) ([facility] [address]):   Diagnosis management comments: Patient with wheezing still - not improving with home meds (albuterol, steroids)   Patient advised to stop smoking, has poor air-conditioning where she lives. Will admit for suspected COPD exacerbation - may need pulmonary consult while in-hospital       Amount and/or Complexity of Data Reviewed  Clinical lab tests: ordered and reviewed  Tests in the radiology section of CPT®: ordered and reviewed  Discuss the patient with other providers: yes (Dr. Carol Pascual - hospitalist to admit)  Independent visualization of images, tracings, or specimens: yes    Patient Progress  Patient progress: stable    ED Course       Procedures   ED EKG interpretation:  Rhythm: sinus tachycardia; and regular . Rate (approx.): 104. Note written by Orlando Mallory, as dictated by Ivana Winter MD 10:06 AM    CONSULT NOTE:  11:49 Roosevelt Peraza MD spoke with Dr. Mike Blevins, Consult for Hospitalist.  Discussed available diagnostic tests and clinical findings. He is in agreement with care plans as outlined. Dr. Carol Pascual will see the patient. EXAM:  XR CHEST PORT     INDICATION:  chest pain     COMPARISON:  8/17/2017     FINDINGS:     A portable AP radiograph of the chest was obtained at 10:26 hours. The patient  is on a cardiac monitor. The lungs are clear. The cardiac and mediastinal  contours and pulmonary vascularity are normal.  The bones and soft tissues are  unremarkable. There has been no change since the prior study.     IMPRESSION  IMPRESSION:      No acute process.

## 2017-08-20 NOTE — H&P
[address]  [phone]    Admission History and Physical      NAME:  April Emanuel   :   1960   MRN:  [id number]     PCP:  Valerie Hernandez MD     Date/Time:  2017         Subjective:     CHIEF COMPLAINT: cough and SOB for 2 weeks     HISTORY OF PRESENT ILLNESS:     Ms. Bal Valencia is a 60 y.o.  female who is admitted with COPD exacerbation. Ms. Bal Valencia with PMH of depression, hyperlipidemia, COPD, tobacco abuse presented to ER c/o cough and SOB, which started 2 weeks ago and became progressively worse. The cough is nonproductive and is associated with SOB. Pt was seen by her PCP then came to ER 2 days ago and was on prednisone and ABx. Her symptoms continue to get worse. Denies chest pain or fever. Past Medical History:   Diagnosis Date    Anxiety     Depression     Diverticulitis     Hyperlipemia         Past Surgical History:   Procedure Laterality Date    HX  SECTION      HX HEART CATHETERIZATION      HX OTHER SURGICAL      knee surgery        Social History   Substance Use Topics    Smoking status: Current Every Day Smoker     Packs/day: 0.50    Smokeless tobacco: Never Used    Alcohol use No        Family History   Problem Relation Age of Onset    Heart Disease Mother     Hypertension Mother     COPD Father         Allergies   Allergen Reactions    Vicodin [Hydrocodone-Acetaminophen] Itching     States \"it makes my feet itch. \"        Prior to Admission medications    Medication Sig Start Date End Date Taking? Authorizing Provider   predniSONE (DELTASONE) 20 mg tablet Take 3 Tabs by mouth daily for 5 days. 17 Yes Sofy Winter MD   albuterol (PROVENTIL HFA, VENTOLIN HFA, PROAIR HFA) 90 mcg/actuation inhaler Take 2 Puffs by inhalation every four (4) hours as needed for Wheezing.  17  Yes Sofy Winter MD   benzonatate (TESSALON PERLES) 100 mg capsule Take 1 Cap by mouth three (3) times daily as needed for Cough for up to 7 days. 8/17/17 8/24/17 Yes Lisa Good MD   artificial saliva (MOUTH KOTE) spra Take 1 Spray by mouth as needed (Dry mouth).  Indications: STOMATITIS 11/15/16  Yes Sana Albright MD         Review of Systems:  (bold if positive, if negative)    Gen:  Eyes:  ENT:  CVS:  Pulm:  Cough, dyspnea,GI:    :    MS:  Skin:  Psych:  Endo:    Hem:  Renal:    Neuro:            Objective:      VITALS:    Vital signs reviewed; most recent are:    Visit Vitals    /75    Pulse (!) 101    Temp 98.2 °F (36.8 °C)    Resp 14    Ht 5' 2\" (1.575 m)    Wt 59 kg (130 lb)    SpO2 91%    BMI 23.78 kg/m2     SpO2 Readings from Last 6 Encounters:   08/20/17 91%   08/17/17 100%   12/10/16 99%   11/14/16 99%   09/04/16 97%   06/24/16 96%        No intake or output data in the 24 hours ending 08/20/17 1215         Exam:     Physical Exam:    Gen:  Well-developed, well-nourished, in no acute distress  HEENT:  Pink conjunctivae, PERRL, hearing intact to voice, moist mucous membranes  Neck:  Supple, without masses, thyroid non-tender  Resp:  No accessory muscle use, BL wheezes    Card:  No murmurs, normal S1, S2 without thrills, bruits or peripheral edema  Abd:  Soft, non-tender, non-distended, normoactive bowel sounds are present, no palpable organomegaly and no detectable hernias  Lymph:  No cervical or inguinal adenopathy  Musc:  No cyanosis or clubbing  Skin:  No rashes or ulcers, skin turgor is good  Neuro:  Cranial nerves are grossly intact, no focal motor weakness, follows commands appropriately  Psych:  Good insight, oriented to person, place and time, alert       Labs:    Recent Labs      08/20/17   1019   WBC  9.7   HGB  14.7   HCT  43.4   PLT  303     Recent Labs      08/20/17   1019  08/17/17   1656   NA  142  139   K  3.7  3.5   CL  105  103   CO2  28  27   GLU  114*  123*   BUN  13  15   CREA  0.81  0.94   CA  9.2  8.8   ALB   --   3.6   TBILI   -- 0. 2   SGOT   --   16   ALT   --   19     [facility] results found for: [facility]      08/20/17   1152   PH  7.50*   PCO2  32*   PO2  62*   HCO3  24   FIO2  21     No results for input(s): INR in the last 72 hours. No lab exists for component: INREXT    Telemetry reviewed:   sinus tachycardia       Assessment/Plan:    1. Acute COPD (chronic obstructive pulmonary disease) exacerbation.([facility]) (8/20/2017). Admit to medical. Start solumedrol, duo neb scheduled and albuterol PRN, Breo, tessalon. Monitor clinically. O2 as needed. Needs outpatient PFT testing. 2.  Depression (11/9/2016). Not on medication. 3.  Hyperlipidemia (8/20/2017). Not taking medication, but has an appointment with her PCP for full physical.     4.  Tobacco abuse. Strongly advised to stop smoking. Nicotine patch for now.        Previous medical records reviewed     Risk of deterioration: high      Total time spent with patient: [address] discussed with: Patient, Nursing Staff and >50% of time spent in counseling and coordination of care    Discussed:  Care Plan    Prophylaxis:  Lovenox    Probable Disposition:  Home w/Family           ___________________________________________________    Attending Physician: Yuriy Herrera MD

## 2017-08-20 NOTE — ED TRIAGE NOTES
Patient arrives to the ER with shortness of breath. States that she was just seen here two days ago with the same complaint. Said that she went to see her PCP a week ago, has a follow up with Pulmonology. Said that she has been on Prednisone and cough medication. Patient states that she feels frustrated because she feels like no one is helping her.

## 2017-08-20 NOTE — Clinical Note
Status[de-identified] Inpatient [101] Type of Bed: Medical [8] Inpatient Hospitalization Certified Necessary for the Following Reasons: 3. Patient receiving treatment that can only be provided in an inpatient setting (further clarification in H&P documentation) Admitting Diagnosis: COPD (chronic obstructive pulmonary disease) ([facility] [[id number]] Admitting Physician: Marie Harris Attending Physician: Marie Harris Estimated Length of Stay: 3-4 Midnights Discharge Plan[de-identified] Home with Office Follow-up

## 2017-08-20 NOTE — ED NOTES
TRANSFER - OUT REPORT:    Verbal report given to susannah torres(name) on [facility]  being transferred to room 507(unit) for routine progression of care       Report consisted of patients Situation, Background, Assessment and   Recommendations(SBAR). Information from the following report(s) SBAR was reviewed with the receiving nurse. Lines:   Peripheral IV 08/20/17 Left Antecubital (Active)   Site Assessment Clean, dry, & intact 8/20/2017 10:34 AM   Phlebitis Assessment 0 8/20/2017 10:34 AM   Infiltration Assessment 0 8/20/2017 10:34 AM   Dressing Status Clean, dry, & intact 8/20/2017 10:34 AM   Hub Color/Line Status Pink 8/20/2017 10:34 AM        Opportunity for questions and clarification was provided.       Patient transported with:   [employer]

## 2017-08-20 NOTE — IP AVS SNAPSHOT
[address] [place] [address] 
[phone] Patient: Margueritte Cowden MRN: [id number] :1960 You are allergic to the following Allergen Reactions Vicodin (Hydrocodone-Acetaminophen) Itching States \"it makes my feet itch. \" Recent Documentation Height Weight BMI OB Status Smoking Status 1.575 m 59 kg 23.78 kg/m2 Menopause Current Every Day Smoker Emergency Contacts Name Discharge Info Relation Home Work Mobile Judi Risk CAREGIVER [3] Child [2]   [phone] About your hospitalization You were admitted on:  2017 You last received care in the:  [facility] 5M1 MED SURG 1 You were discharged on:  2017 Unit phone number:  [phone] Why you were hospitalized Your primary diagnosis was:  Copd Exacerbation (Hcc) Your diagnoses also included:  Fibromyalgia, Depression, Hyperlipidemia, Pneumonia Providers Seen During Your Hospitalizations Provider Role Specialty Primary office phone Brandan Chavez MD Attending Provider Emergency Medicine [phone] Catrachita Anderson MD Attending Provider Internal Medicine [phone] Your Primary Care Physician (PCP) Primary Care Physician Office Phone Office Fax Courtney Phan [phone] Follow-up Information Follow up With Details Comments Contact Info Naveed Alarcon MD   [address] [facility] 
[phone] [employer]  home oxygen and nebulizer. [address] [address] 
[phone] Current Discharge Medication List  
  
START taking these medications Dose & Instructions Dispensing Information Comments Morning Noon Evening Bedtime  
 albuterol-ipratropium 2.5 mg-0.5 mg/3 ml Nebu Commonly known as:  Karen Butts Your last dose was: Your next dose is:    
   
   
 Dose:  3 mL  
3 mL by Nebulization route every 4 hours daily while awake resp. Quantity:  120 Nebule Refills:  0  
     
   
   
   
  
 fluticasone-vilanterol 100-25 mcg/dose inhaler Commonly known as:  JOSE ELLIPTA Your last dose was: Your next dose is:    
   
   
 Dose:  1 Puff Take 1 Puff by inhalation daily. Quantity:  1 Inhaler Refills:  0  
     
   
   
   
  
 guaiFENesin  mg ER tablet Commonly known as:  [place] [address] Your last dose was: Your next dose is:    
   
   
 Dose:  600 mg Take 1 Tab by mouth two (2) times a day. Quantity:  60 Tab Refills:  0  
     
   
   
   
  
 guaiFENesin-codeine 100-10 mg/5 mL solution Commonly known as:  ROBITUSSIN AC Your last dose was: Your next dose is:    
   
   
 Dose:  10 mL Take 10 mL by mouth every six (6) hours as needed for Cough. Max Daily Amount: 40 mL. Quantity:  240 mL Refills:  0  
     
   
   
   
  
 levoFLOXacin 750 mg tablet Commonly known as:  Anne-Marie Parmar Your last dose was: Your next dose is:    
   
   
 Dose:  750 mg Take 1 Tab by mouth daily for 3 days. Quantity:  3 Tab Refills:  0 Nebulizer & Compressor machine Your last dose was: Your next dose is: As directed Quantity:  1 Each Refills:  0 Nebulizer Accessories Kit Your last dose was: Your next dose is: As directed Quantity:  1 Kit Refills:  0 CONTINUE these medications which have CHANGED Dose & Instructions Dispensing Information Comments Morning Noon Evening Bedtime  
 predniSONE 20 mg tablet Commonly known as:  Paige Rollins What changed:   
- how much to take 
- how to take this - when to take this 
- additional instructions Your last dose was: Your next dose is: 2 tabs twice a day for 4 days, then 1 tab twice a day for 4 days, then 1 tab daily for 4 days, then 1/2 tab daily for 4 days. (30 tabs, 16 days total) Quantity:  30 Tab Refills:  0 CONTINUE these medications which have NOT CHANGED Dose & Instructions Dispensing Information Comments Morning Noon Evening Bedtime  
 albuterol 90 mcg/actuation inhaler Commonly known as:  PROVENTIL HFA, VENTOLIN HFA, PROAIR HFA Your last dose was: Your next dose is:    
   
   
 Dose:  2 Puff Take 2 Puffs by inhalation every four (4) hours as needed for Wheezing. Quantity:  1 Inhaler Refills:  0 STOP taking these medications   
 benzonatate 100 mg capsule Commonly known as:  TESSALON PERLES Where to Get Your Medications These medications were sent to [facility] [address], [facility] [place] [address] Hours:  24-hours Phone:  [phone]  
  albuterol-ipratropium 2.5 mg-0.5 mg/3 ml Nebu  
 fluticasone-vilanterol 100-25 mcg/dose inhaler  
 guaiFENesin  mg ER tablet  
 levoFLOXacin 750 mg tablet Information on where to get these meds will be given to you by the nurse or doctor. ! Ask your nurse or doctor about these medications  
  guaiFENesin-codeine 100-10 mg/5 mL solution Nebulizer & Compressor machine Nebulizer Accessories Kit  
 predniSONE 20 mg tablet Discharge Instructions None Discharge Orders None [employer] Announcement We are excited to announce that we are making your provider's discharge notes available to you in [employer]. You will see these notes when they are completed and signed by the physician that discharged you from your recent hospital stay.   If you have any questions or concerns about any information you see in [employer], please call the [employer] Department where you were seen or reach out to your Primary Care Provider for more information about your plan of care. Introducing [facility]! Ese Mireles introduces [employer] patient portal. Now you can access parts of your medical record, email your doctor's office, and request medication refills online. 1. In your internet browser, go to [email or web address] 2. Click on the First Time User? Click Here link in the Sign In box. You will see the New Member Sign Up page. 3. Enter your [employer] Access Code exactly as it appears below. You will not need to use this code after youve completed the sign-up process. If you do not sign up before the expiration date, you must request a new code. · [employer] Access Code: [email or web address] Expires: 10/21/2017  2:34 PM 
 
4. Enter the last four digits of your Social Security Number (xxxx) and Date of Birth (mm/dd/yyyy) as indicated and click Submit. You will be taken to the next sign-up page. 5. Create a Celaton ID. This will be your Celaton login ID and cannot be changed, so think of one that is secure and easy to remember. 6. Create a [employer] password. You can change your password at any time. 7. Enter your Password Reset Question and Answer. This can be used at a later time if you forget your password. 8. Enter your e-mail address. You will receive e-mail notification when new information is available in [address] 9. Click Sign Up. You can now view and download portions of your medical record. 10. Click the Download Summary menu link to download a portable copy of your medical information. If you have questions, please visit the Frequently Asked Questions section of the [employer] website. Remember, [employer] is NOT to be used for urgent needs. For medical emergencies, dial 911. Now available from your iPhone and Android! General Information Please provide this summary of care documentation to your next provider. Patient Signature:  ____________________________________________________________ Date:  ____________________________________________________________  
  
Claudene Born Provider Signature:  ____________________________________________________________ Date:  ____________________________________________________________

## 2017-08-20 NOTE — PROGRESS NOTES
[facility]: MED RECONCILIATION    Comments/Recommendations:    Prednisone - patient is on day 4 of 5   She completed a 5 day course of Azithromycin about a week ago   Patient verbalized motivation to stop smoking   Patient reports she is no longer on medication therapy for anxiety and depression   Patient reports she does not take anything to help her sleep. [place]  reports Zolpidem 5 mg filled on 8/11/17      Information obtained from: patient    Significant PMH/Disease States:   Past Medical History:   Diagnosis Date    Anxiety     Depression     Diverticulitis     Hyperlipemia      Chief Complaint for this Admission:   Chief Complaint   Patient presents with    Shortness of Breath     Allergies: Vicodin [hydrocodone-acetaminophen]    Prior to Admission Medications:     Medication Documentation Review Audit       Reviewed by Najma Fernandez [facility] (Pharmacist) on 08/20/17 at 5         Medication Sig Documenting Provider Last Dose Status Taking? albuterol (PROVENTIL HFA, VENTOLIN HFA, PROAIR HFA) 90 mcg/actuation inhaler Take 2 Puffs by inhalation every four (4) hours as needed for Wheezing. Nancy Jenkins MD 8/20/2017 Unknown time Active Yes    benzonatate (TESSALON PERLES) 100 mg capsule Take 1 Cap by mouth three (3) times daily as needed for Cough for up to 7 days. Nancy Jenkins MD  Active Yes             Med Note [facility] Aug 20, 2017 12:15 PM): Patient reports these have not helped with cough      predniSONE (DELTASONE) 20 mg tablet Take 3 Tabs by mouth daily for 5 days. Nancy Jenkins MD 8/20/2017 Unknown time Active Yes             Med Note [facility] Aug 20, 2017 12:16 PM): Started 8/17                      Thank you,    Patricia Barksdale.  Darrius Rivas [facility]

## 2017-08-20 NOTE — PROGRESS NOTES
Bedside and Verbal shift change report given to Mesha Pavon RN (oncoming nurse) by Jesus Das RN (offgoing nurse). Report included the following information SBAR, Kardex, MAR and Recent Results.

## 2017-08-21 ENCOUNTER — APPOINTMENT (OUTPATIENT)
Dept: CT IMAGING | Age: 57
DRG: 190 | End: 2017-08-21
Attending: NURSE PRACTITIONER
Payer: COMMERCIAL

## 2017-08-21 PROBLEM — E78.5 HYPERLIPIDEMIA: Chronic | Status: ACTIVE | Noted: 2017-08-20

## 2017-08-21 PROBLEM — J44.9 COPD (CHRONIC OBSTRUCTIVE PULMONARY DISEASE) (HCC): Chronic | Status: ACTIVE | Noted: 2017-08-20

## 2017-08-21 LAB
ATRIAL RATE: 104 BPM
CALCULATED P AXIS, ECG09: 80 DEGREES
CALCULATED R AXIS, ECG10: 26 DEGREES
CALCULATED T AXIS, ECG11: 76 DEGREES
DIAGNOSIS, 93000: NORMAL
P-R INTERVAL, ECG05: 148 MS
Q-T INTERVAL, ECG07: 328 MS
QRS DURATION, ECG06: 76 MS
QTC CALCULATION (BEZET), ECG08: 431 MS
VENTRICULAR RATE, ECG03: 104 BPM

## 2017-08-21 PROCEDURE — 65270000029 HC RM PRIVATE

## 2017-08-21 PROCEDURE — 71275 CT ANGIOGRAPHY CHEST: CPT

## 2017-08-21 PROCEDURE — 74011636320 HC RX REV CODE- 636/320: Performed by: RADIOLOGY

## 2017-08-21 PROCEDURE — 74011250637 HC RX REV CODE- 250/637: Performed by: INTERNAL MEDICINE

## 2017-08-21 PROCEDURE — 74011250636 HC RX REV CODE- 250/636: Performed by: INTERNAL MEDICINE

## 2017-08-21 PROCEDURE — 74011000250 HC RX REV CODE- 250: Performed by: INTERNAL MEDICINE

## 2017-08-21 PROCEDURE — 74011250637 HC RX REV CODE- 250/637: Performed by: NURSE PRACTITIONER

## 2017-08-21 PROCEDURE — 94640 AIRWAY INHALATION TREATMENT: CPT

## 2017-08-21 RX ORDER — LEVOFLOXACIN 500 MG/1
500 TABLET, FILM COATED ORAL EVERY 24 HOURS
Status: DISCONTINUED | OUTPATIENT
Start: 2017-08-21 | End: 2017-08-23 | Stop reason: HOSPADM

## 2017-08-21 RX ORDER — CODEINE PHOSPHATE AND GUAIFENESIN 10; 100 MG/5ML; MG/5ML
10 SOLUTION ORAL
Status: DISCONTINUED | OUTPATIENT
Start: 2017-08-21 | End: 2017-08-23 | Stop reason: HOSPADM

## 2017-08-21 RX ADMIN — Medication 10 ML: at 14:10

## 2017-08-21 RX ADMIN — IPRATROPIUM BROMIDE AND ALBUTEROL SULFATE 3 ML: .5; 3 SOLUTION RESPIRATORY (INHALATION) at 13:00

## 2017-08-21 RX ADMIN — GUAIFENESIN AND CODEINE PHOSPHATE 5 ML: 100; 10 SOLUTION ORAL at 08:48

## 2017-08-21 RX ADMIN — GUAIFENESIN 600 MG: 600 TABLET, EXTENDED RELEASE ORAL at 08:48

## 2017-08-21 RX ADMIN — IBUPROFEN 400 MG: 400 TABLET, FILM COATED ORAL at 12:30

## 2017-08-21 RX ADMIN — IPRATROPIUM BROMIDE AND ALBUTEROL SULFATE 3 ML: .5; 3 SOLUTION RESPIRATORY (INHALATION) at 20:21

## 2017-08-21 RX ADMIN — ZOLPIDEM TARTRATE 5 MG: 5 TABLET ORAL at 00:14

## 2017-08-21 RX ADMIN — METHYLPREDNISOLONE SODIUM SUCCINATE 40 MG: 40 INJECTION, POWDER, FOR SOLUTION INTRAMUSCULAR; INTRAVENOUS at 20:13

## 2017-08-21 RX ADMIN — METHYLPREDNISOLONE SODIUM SUCCINATE 40 MG: 40 INJECTION, POWDER, FOR SOLUTION INTRAMUSCULAR; INTRAVENOUS at 02:30

## 2017-08-21 RX ADMIN — Medication 10 ML: at 02:31

## 2017-08-21 RX ADMIN — Medication 10 ML: at 20:14

## 2017-08-21 RX ADMIN — GUAIFENESIN 600 MG: 600 TABLET, EXTENDED RELEASE ORAL at 20:13

## 2017-08-21 RX ADMIN — IPRATROPIUM BROMIDE AND ALBUTEROL SULFATE 3 ML: .5; 3 SOLUTION RESPIRATORY (INHALATION) at 07:00

## 2017-08-21 RX ADMIN — GUAIFENESIN AND CODEINE PHOSPHATE 5 ML: 100; 10 SOLUTION ORAL at 16:06

## 2017-08-21 RX ADMIN — METHYLPREDNISOLONE SODIUM SUCCINATE 40 MG: 40 INJECTION, POWDER, FOR SOLUTION INTRAMUSCULAR; INTRAVENOUS at 14:10

## 2017-08-21 RX ADMIN — IOPAMIDOL 75 ML: 755 INJECTION, SOLUTION INTRAVENOUS at 18:03

## 2017-08-21 RX ADMIN — ENOXAPARIN SODIUM 40 MG: 40 INJECTION SUBCUTANEOUS at 08:48

## 2017-08-21 RX ADMIN — FLUTICASONE FUROATE AND VILANTEROL TRIFENATATE 1 PUFF: 100; 25 POWDER RESPIRATORY (INHALATION) at 09:54

## 2017-08-21 RX ADMIN — IPRATROPIUM BROMIDE AND ALBUTEROL SULFATE 3 ML: .5; 3 SOLUTION RESPIRATORY (INHALATION) at 17:35

## 2017-08-21 RX ADMIN — LEVOFLOXACIN 500 MG: 500 TABLET, FILM COATED ORAL at 17:19

## 2017-08-21 RX ADMIN — METHYLPREDNISOLONE SODIUM SUCCINATE 40 MG: 40 INJECTION, POWDER, FOR SOLUTION INTRAMUSCULAR; INTRAVENOUS at 08:48

## 2017-08-21 NOTE — PROGRESS NOTES
CM Note:  Met with pt for d/c planning. PTA pt was independent with ADL's, was driving and ambulatory. No DME at home. She has never had home health. Her emergency contact is her daughter, Yevgeniy Grandchild ([phone]). Pt has Rx coverage and gets her medications from [facility]. No needs anticipated for d/c. Home when medically stable. Luis Daniel RN    Care Management Interventions  PCP Verified by CM: Yes (PCP is Dr. Betsy Aldridge.)  [facility] (Criteria: CHF and RRAT>21): No  Reason for No Palliative Care Consult: Other (see comment)  MyChart Signup: No  Discharge Durable Medical Equipment: No  Physical Therapy Consult: No  Occupational Therapy Consult: No  Speech Therapy Consult: No  Current Support Network: Other (Pt rents a room in a 2 story house.   There are 12 steps to second floor and 3 entry steps,)  Confirm Follow Up Transport: Self (Pt to drive herself home at d/c.)  Plan discussed with Pt/Family/Caregiver: Yes  Discharge Location  Discharge Placement: Home with two level

## 2017-08-21 NOTE — CONSULTS
Name: [facility]: [address]   : 1960 Admit Date: 2017   Phone: [phone]  Room: [facility]   PCP: Robert Richards MD  MRN: [id number]   Date: 2017  Code: Full Code        HPI:    Chart and notes reviewed. Data reviewed. I review the patient's current medications in the medical record at each encounter. I have evaluated and examined the patient. History of Present Illness:  Ms. Jaren Cheatham presented to the ED yesterday with complaints of SOB, cough with yellow/green sputum production, and wheezing. Symptoms began about 2 weeks ago. She went to her PCP and was given a Zpak and Tessalon Perles. She did not improve. She was then called in a prednisone taper and still did not recover. She then went to the ED on 17 and was given back to back albuterol neb treatments and IV steroids. CXR on 17 showed no acute intrathoracic disease. She still is having significant SOB with exertion, barky cough with some sputum production and nasal congestion. She denies fever, chills, N/V/D. She has a history of anxiety/depression, bipolar, pulmonary nodules, diverticulitis, HLD, and current 1/2 ppd smoker. She had PFT's completed at [facility] on 14 that showed FEV1 70%, FVC 74%, FEV1/FVC 75%, TLC 98%, %. She also has a history of pulmonary nodules, but was followed with chest CT's for about 2 years and did not require any follow up. She is on no inhalers. She had seasonal/environmental allergies as a child, but not since in her 17's. She has had bouts of bronchitis in the past. Ms. Jaren Cheatham recently had her  leave her and was forced to move into a rented room. She is now working at [employer]. Afebrile  Oxygen saturations 96% on RA  BP and HR stable  WBC 9.7  HgB 14.7  Na 142  K 3.7    Creatinine 0.81  ABG 17: pH 7.50, pCO2 32, pO2 62, HCO3 24, sO2 94 on RA    Images:  CXR 17: no acute process.   CTA Chest 2/2/15: No pulmonary emboli in the major pulmonary arteries. Some plaquing of the aortic arch superior laterally. Subtle tiny density posterior lateral superior segment RLL as before. Tiny density anterolateral RUL as before. No change since 2008 when considering technical differences. Past Medical History:   Diagnosis Date    Anxiety     Depression     Diverticulitis     Hyperlipemia        Past Surgical History:   Procedure Laterality Date    HX  SECTION      HX HEART CATHETERIZATION      HX OTHER SURGICAL      knee surgery        Family History   Problem Relation Age of Onset    Heart Disease Mother     Hypertension Mother     COPD Father        Social History   Substance Use Topics    Smoking status: Current Every Day Smoker     Packs/day: 0.50    Smokeless tobacco: Never Used    Alcohol use No       Allergies   Allergen Reactions    Vicodin [Hydrocodone-Acetaminophen] Itching     States \"it makes my feet itch. \"       Current Facility-Administered Medications   Medication Dose Route Frequency    sodium chloride (NS) flush 5-10 mL  5-10 mL IntraVENous Q8H    sodium chloride (NS) flush 5-10 mL  5-10 mL IntraVENous PRN    benzonatate (TESSALON) capsule 100 mg  100 mg Oral TID PRN    sodium chloride (NS) flush 5-10 mL  5-10 mL IntraVENous Q8H    sodium chloride (NS) flush 5-10 mL  5-10 mL IntraVENous PRN    enoxaparin (LOVENOX) injection 40 mg  40 mg SubCUTAneous DAILY    methylPREDNISolone (PF) (SOLU-MEDROL) injection 40 mg  40 mg IntraVENous Q6H    albuterol (PROVENTIL VENTOLIN) nebulizer solution 2.5 mg  2.5 mg Nebulization Q2H PRN    fluticasone-vilanterol (BREO ELLIPTA) 100mcg-25mcg/puff  1 Puff Inhalation DAILY    zolpidem (AMBIEN) tablet 5 mg  5 mg Oral QHS PRN    nicotine (NICODERM CQ) 21 mg/24 hr patch 1 Patch  1 Patch TransDERmal DAILY    guaiFENesin ER (MUCINEX) tablet 600 mg  600 mg Oral BID    albuterol-ipratropium (DUO-NEB) 2.5 MG-0.5 MG/3 ML  3 mL Nebulization Q4HWA RT    ibuprofen (MOTRIN) tablet 400 mg  400 mg Oral Q6H PRN    guaiFENesin-codeine (ROBITUSSIN AC) 100-10 mg/5 mL solution 5 mL  5 mL Oral Q6H PRN         REVIEW OF SYSTEMS   12 point ROS negative except as stated in the HPI. Physical Exam:   Visit Vitals    /78 (BP 1 Location: Left arm, BP Patient Position: At rest)    Pulse 95    Temp 98.8 °F (37.1 °C)    Resp 16    Ht 5' 2\" (1.575 m)    Wt 59 kg (130 lb)    SpO2 96%    BMI 23.78 kg/m2       General:  Alert, cooperative, no distress, appears stated age. Head:  Normocephalic, without obvious abnormality, atraumatic. Eyes:  Conjunctivae/corneas clear. Nose: Nares normal. Septum midline. Mucosa normal.    Throat: Lips, mucosa, and tongue normal.    Neck: Supple, symmetrical, trachea midline, no adenopathy. Lungs:   Clear to auscultation bilaterally. Chest wall:  No tenderness or deformity. Heart:  Regular rate and rhythm, S1, S2 normal, no murmur, click, rub or gallop. Abdomen:   Soft, non-tender. Bowel sounds normal. No masses,  No organomegaly. Extremities: Extremities normal, atraumatic, no cyanosis or edema. Pulses: 2+ and symmetric all extremities. Skin: Skin color, texture, turgor normal. No rashes or lesions   Lymph nodes: Cervical, supraclavicular nodes normal.   Neurologic: Grossly nonfocal       Lab Results   Component Value Date/Time    Sodium 142 08/20/2017 10:19 AM    Potassium 3.7 08/20/2017 10:19 AM    Chloride 105 08/20/2017 10:19 AM    CO2 28 08/20/2017 10:19 AM    BUN 13 08/20/2017 10:19 AM    Creatinine 0.81 08/20/2017 10:19 AM    Glucose 114 08/20/2017 10:19 AM    Calcium 9.2 08/20/2017 10:19 AM       Lab Results   Component Value Date/Time    WBC 9.7 08/20/2017 10:19 AM    HGB 14.7 08/20/2017 10:19 AM    PLATELET 646 01/61/3639 10:19 AM    MCV 85.3 08/20/2017 10:19 AM       Lab Results   Component Value Date/Time    AST (SGOT) 16 08/17/2017 04:56 PM    Alk.  phosphatase 104 08/17/2017 04:56 PM    Protein, total 7.1 08/17/2017 04:56 PM    Albumin 3.6 08/17/2017 04:56 PM    Globulin 3.5 08/17/2017 04:56 PM       No results found for: IRON, FE, TIBC, IBCT, PSAT, FERR    Lab Results   Component Value Date/Time    TSH 1.07 11/09/2016 04:27 PM        No results found for: PH, PHI, PCO2, PCO2I, PO2, PO2I, HCO3, HCO3I, FIO2, FIO2I    No results found for: CPK, RCK1, RCK2, RCK3, RCK4, CKNDX, CKND1, TROPT, TROIQ, BNPP, BNP     No results found for: CULT    No results found for: TOXA1, RPR, HBCM, HBSAG, HAAB, HCAB1, HAAT, G6PD, CRYAC, HIVGT, HIVR, HIV1, HIV12, HIVPC, HIVRPI    No results found for: VANCT, CPK    Lab Results   Component Value Date/Time    Color YELLOW/STRAW 11/09/2016 03:38 PM    Appearance CLEAR 11/09/2016 03:38 PM    Specific gravity 1.005 09/04/2016 11:34 AM    pH (UA) 6.5 11/09/2016 03:38 PM    Protein NEGATIVE  11/09/2016 03:38 PM    Glucose NEGATIVE  11/09/2016 03:38 PM    Ketone NEGATIVE  11/09/2016 03:38 PM    Bilirubin NEGATIVE  11/09/2016 03:38 PM    Blood SMALL 11/09/2016 03:38 PM    Urobilinogen 0.2 11/09/2016 03:38 PM    Nitrites NEGATIVE  11/09/2016 03:38 PM    Leukocyte Esterase NEGATIVE  11/09/2016 03:38 PM    WBC 0-4 11/09/2016 03:38 PM    RBC 0-5 11/09/2016 03:38 PM    Bacteria NEGATIVE  11/09/2016 03:38 PM       IMPRESSION  · SOB  · Bronchitis  · Diverticulitis  · Anxiety/Depression  · Current smoker    PLAN  · Maintain oxygen saturations > 90%  · Will order for CTA chest  · Ordered RVP and sputum culture  · Start Levaquin today  · Continue DuoNebs and 40 mg Q6 IV SoluMedrol  · Continue Breo 100  · Continue Mucinex  · Smoking cessation and nicotine patch  · DVT prophylaxis: Lovenox  · GI prophylaxis: not indicated    Thank you for allowing us to participate in the care of this patient. We will be happy to follow along in her progress with you.     Mihaela Jean Baptiste NP

## 2017-08-21 NOTE — ROUTINE PROCESS
Primary Nurse Ary Brar RN and Tri King RN performed a dual skin assessment on this patient. No impairment noted. Michael score is 23.

## 2017-08-21 NOTE — PROGRESS NOTES
Bedside and Verbal shift change report given to Ashleigh Marrero RN (oncoming nurse) by Bianka Araujo RN (offgoing nurse). Report included the following information SBAR, Kardex, Intake/Output, MAR, Accordion and Recent Results.

## 2017-08-21 NOTE — PROGRESS NOTES
Called and spoke to Dr. Balwinder Dietz regarding patient's c/o continuous coughing and headache due to coughing. He ordered Robitussin with codeine 5 ml every 6 hours prn (MD made aware of noted vicodin allergy; and patient stated it only caused foot itching). He also ordered ibuprofen 400 mg every 6 hours prn headache.

## 2017-08-21 NOTE — PROGRESS NOTES
Johnny [address]res [address] [place] [address]  5[address] [facility], [address] [place] [address]  [phone]      Medical Progress Note      NAME: Harjeet Kimbrough   :  1960  MRM:  [id number]    Date/Time: 2017  11:04 AM         Subjective:     Chief Complaint:  SOB: moderate, constant, slightly improved, associated with cough    ROS:  (bold if positive, if negative)                        Tolerating Diet          Objective:       Vitals:          Last 24hrs VS reviewed since prior progress note.  Most recent are:    Visit Vitals    /81 (BP 1 Location: Left arm, BP Patient Position: At rest)    Pulse (!) 104    Temp 98.2 °F (36.8 °C)    Resp 16    Ht 5' 2\" (1.575 m)    Wt 59 kg (130 lb)    SpO2 90%    BMI 23.78 kg/m2     SpO2 Readings from Last 6 Encounters:   17 90%   17 100%   12/10/16 99%   16 99%   16 97%   16 96%          Intake/Output Summary (Last 24 hours) at 17 1104  Last data filed at 17 1430   Gross per 24 hour   Intake              240 ml   Output                0 ml   Net              240 ml          Exam:     Physical Exam:    Gen:  Well-developed, well-nourished, in no acute distress  HEENT:  Pink conjunctivae, PERRL, hearing intact to voice, moist mucous membranes  Neck:  Supple, without masses, thyroid non-tender  Resp:  No accessory muscle use, frequent coughing but clear breath sounds  Card:  No murmurs, normal S1, S2 without thrills, bruits or peripheral edema  Abd:  Soft, non-tender, non-distended, normoactive bowel sounds are present, no palpable organomegaly and no detectable hernias  Lymph:  No cervical or inguinal adenopathy  Musc:  No cyanosis or clubbing  Skin:  No rashes or ulcers, skin turgor is good  Neuro:  Cranial nerves are grossly intact, no focal motor weakness, follows commands appropriately  Psych:  Good insight, oriented to person, place and time, alert    Medications Reviewed: (see below)    Lab Data Reviewed: (see below)    ______________________________________________________________________    Medications:     Current Facility-Administered Medications   Medication Dose Route Frequency    sodium chloride (NS) flush 5-10 mL  5-10 mL IntraVENous Q8H    sodium chloride (NS) flush 5-10 mL  5-10 mL IntraVENous PRN    benzonatate (TESSALON) capsule 100 mg  100 mg Oral TID PRN    sodium chloride (NS) flush 5-10 mL  5-10 mL IntraVENous Q8H    sodium chloride (NS) flush 5-10 mL  5-10 mL IntraVENous PRN    enoxaparin (LOVENOX) injection 40 mg  40 mg SubCUTAneous DAILY    methylPREDNISolone (PF) (SOLU-MEDROL) injection 40 mg  40 mg IntraVENous Q6H    albuterol (PROVENTIL VENTOLIN) nebulizer solution 2.5 mg  2.5 mg Nebulization Q2H PRN    fluticasone-vilanterol (BREO ELLIPTA) 100mcg-25mcg/puff  1 Puff Inhalation DAILY    zolpidem (AMBIEN) tablet 5 mg  5 mg Oral QHS PRN    nicotine (NICODERM CQ) 21 mg/24 hr patch 1 Patch  1 Patch TransDERmal DAILY    guaiFENesin ER (MUCINEX) tablet 600 mg  600 mg Oral BID    albuterol-ipratropium (DUO-NEB) 2.5 MG-0.5 MG/3 ML  3 mL Nebulization Q4HWA RT    ibuprofen (MOTRIN) tablet 400 mg  400 mg Oral Q6H PRN    guaiFENesin-codeine (ROBITUSSIN AC) 100-10 mg/5 mL solution 5 mL  5 mL Oral Q6H PRN            Lab Review:     Recent Labs      08/20/17   1019   WBC  9.7   HGB  14.7   HCT  43.4   PLT  303     Recent Labs      08/20/17   1019   NA  142   K  3.7   CL  105   CO2  28   GLU  114*   BUN  13   CREA  0.81   CA  9.2     No results found for: GLUCPOC  Recent Labs      08/20/17   1152   PH  7.50*   PCO2  32*   PO2  62*   HCO3  24   FIO2  21     No results for input(s): INR in the last 72 hours.     No lab exists for component: INREXT  No results found for: SDES  No results found for: CULT         Assessment:     Principal Problem:    COPD exacerbation ([address] (8/20/2017)    Active Problems:    Depression (11/9/2016)      Fibromyalgia (11/10/2016)      Hyperlipidemia (8/20/2017) Plan:     Principal Problem:    COPD exacerbation ([facility] (8/20/2017)   - slight improvement, continue nebs, steroids, etc   - consult Pulmonary, patient does not have outpatient pulmonary follow up and needs additional outpatient testing    Active Problems:    Depression (11/9/2016)/Fibromyalgia (11/10/2016)   - not on psych meds      Hyperlipidemia (8/20/2017)   - not on statin, outpatient follow up with PCP      Total time spent with patient: 35 minutes                  Care Plan discussed with: Patient, Care Manager and Nursing Staff    Discussed:  Code Status, Care Plan and D/C Planning    Prophylaxis:  Lovenox    Disposition:  Home w/Family           ___________________________________________________    Attending Physician: Mary Nguyen MD

## 2017-08-22 PROBLEM — J18.9 PNEUMONIA: Status: ACTIVE | Noted: 2017-08-22

## 2017-08-22 PROCEDURE — 74011250636 HC RX REV CODE- 250/636: Performed by: NURSE PRACTITIONER

## 2017-08-22 PROCEDURE — 94640 AIRWAY INHALATION TREATMENT: CPT

## 2017-08-22 PROCEDURE — 74011250637 HC RX REV CODE- 250/637: Performed by: INTERNAL MEDICINE

## 2017-08-22 PROCEDURE — 74011250636 HC RX REV CODE- 250/636: Performed by: INTERNAL MEDICINE

## 2017-08-22 PROCEDURE — 74011250637 HC RX REV CODE- 250/637: Performed by: NURSE PRACTITIONER

## 2017-08-22 PROCEDURE — 74011000250 HC RX REV CODE- 250: Performed by: INTERNAL MEDICINE

## 2017-08-22 PROCEDURE — 65270000029 HC RM PRIVATE

## 2017-08-22 RX ADMIN — LEVOFLOXACIN 500 MG: 500 TABLET, FILM COATED ORAL at 16:53

## 2017-08-22 RX ADMIN — GUAIFENESIN AND CODEINE PHOSPHATE 10 ML: 10; 100 LIQUID ORAL at 09:12

## 2017-08-22 RX ADMIN — GUAIFENESIN AND CODEINE PHOSPHATE 10 ML: 10; 100 LIQUID ORAL at 00:53

## 2017-08-22 RX ADMIN — ENOXAPARIN SODIUM 40 MG: 40 INJECTION SUBCUTANEOUS at 09:00

## 2017-08-22 RX ADMIN — Medication 10 ML: at 14:06

## 2017-08-22 RX ADMIN — GUAIFENESIN 600 MG: 600 TABLET, EXTENDED RELEASE ORAL at 09:00

## 2017-08-22 RX ADMIN — FLUTICASONE FUROATE AND VILANTEROL TRIFENATATE 1 PUFF: 100; 25 POWDER RESPIRATORY (INHALATION) at 09:00

## 2017-08-22 RX ADMIN — GUAIFENESIN AND CODEINE PHOSPHATE 10 ML: 10; 100 LIQUID ORAL at 17:00

## 2017-08-22 RX ADMIN — GUAIFENESIN 600 MG: 600 TABLET, EXTENDED RELEASE ORAL at 20:24

## 2017-08-22 RX ADMIN — ZOLPIDEM TARTRATE 5 MG: 5 TABLET ORAL at 01:11

## 2017-08-22 RX ADMIN — IBUPROFEN 400 MG: 400 TABLET, FILM COATED ORAL at 20:23

## 2017-08-22 RX ADMIN — IPRATROPIUM BROMIDE AND ALBUTEROL SULFATE 3 ML: .5; 3 SOLUTION RESPIRATORY (INHALATION) at 07:36

## 2017-08-22 RX ADMIN — IPRATROPIUM BROMIDE AND ALBUTEROL SULFATE 3 ML: .5; 3 SOLUTION RESPIRATORY (INHALATION) at 11:20

## 2017-08-22 RX ADMIN — METHYLPREDNISOLONE SODIUM SUCCINATE 40 MG: 40 INJECTION, POWDER, FOR SOLUTION INTRAMUSCULAR; INTRAVENOUS at 01:10

## 2017-08-22 RX ADMIN — IPRATROPIUM BROMIDE AND ALBUTEROL SULFATE 3 ML: .5; 3 SOLUTION RESPIRATORY (INHALATION) at 15:27

## 2017-08-22 RX ADMIN — METHYLPREDNISOLONE SODIUM SUCCINATE 40 MG: 40 INJECTION, POWDER, FOR SOLUTION INTRAMUSCULAR; INTRAVENOUS at 16:53

## 2017-08-22 RX ADMIN — METHYLPREDNISOLONE SODIUM SUCCINATE 40 MG: 40 INJECTION, POWDER, FOR SOLUTION INTRAMUSCULAR; INTRAVENOUS at 09:00

## 2017-08-22 RX ADMIN — Medication 10 ML: at 14:05

## 2017-08-22 RX ADMIN — IBUPROFEN 400 MG: 400 TABLET, FILM COATED ORAL at 01:10

## 2017-08-22 RX ADMIN — IPRATROPIUM BROMIDE AND ALBUTEROL SULFATE 3 ML: .5; 3 SOLUTION RESPIRATORY (INHALATION) at 19:36

## 2017-08-22 RX ADMIN — Medication 10 ML: at 01:11

## 2017-08-22 NOTE — PROGRESS NOTES
Johnny Peres [facility] [facility]  [address]  [phone]      Medical Progress Note      NAME: João Turner   :  1960  MRM:  [id number]    Date/Time: 2017  10:26 AM          Subjective:     Chief Complaint:  SOB: moderate, constant, slightly improved, associated with cough    ROS:  (bold if positive, if negative)       Cough         SOB/DAVIS        Tolerating Diet          Objective:       Vitals:          Last 24hrs VS reviewed since prior progress note.  Most recent are:    Visit Vitals    /88 (BP 1 Location: Left arm, BP Patient Position: At rest)    Pulse 100    Temp 98.7 °F (37.1 °C)    Resp 18    Ht 5' 2\" (1.575 m)    Wt 59 kg (130 lb)    SpO2 98%    BMI 23.78 kg/m2     SpO2 Readings from Last 6 Encounters:   17 98%   17 100%   12/10/16 99%   16 99%   16 97%   16 96%        No intake or output data in the 24 hours ending 17 1026       Exam:     Physical Exam:    Gen:  Well-developed, well-nourished, in no acute distress  HEENT:  Pink conjunctivae, PERRL, hearing intact to voice, moist mucous membranes  Neck:  Supple, without masses, thyroid non-tender  Resp:  No accessory muscle use, frequent coughing but clear breath sounds  Card:  No murmurs, normal S1, S2 without thrills, bruits or peripheral edema  Abd:  Soft, non-tender, non-distended, normoactive bowel sounds are present, no palpable organomegaly and no detectable hernias  Lymph:  No cervical or inguinal adenopathy  Musc:  No cyanosis or clubbing  Skin:  No rashes or ulcers, skin turgor is good  Neuro:  Cranial nerves are grossly intact, no focal motor weakness, follows commands appropriately  Psych:  Good insight, oriented to person, place and time, alert    Medications Reviewed: (see below)    Lab Data Reviewed: (see below)    ______________________________________________________________________    Medications:     Current Facility-Administered Medications   Medication Dose Route Frequency    levoFLOXacin (LEVAQUIN) tablet 500 mg  500 mg Oral Q24H    guaiFENesin-codeine (ROBITUSSIN AC) 100-10 mg/5 mL solution 10 mL  10 mL Oral Q6H PRN    sodium chloride (NS) flush 5-10 mL  5-10 mL IntraVENous Q8H    sodium chloride (NS) flush 5-10 mL  5-10 mL IntraVENous PRN    benzonatate (TESSALON) capsule 100 mg  100 mg Oral TID PRN    sodium chloride (NS) flush 5-10 mL  5-10 mL IntraVENous Q8H    sodium chloride (NS) flush 5-10 mL  5-10 mL IntraVENous PRN    enoxaparin (LOVENOX) injection 40 mg  40 mg SubCUTAneous DAILY    methylPREDNISolone (PF) (SOLU-MEDROL) injection 40 mg  40 mg IntraVENous Q6H    albuterol (PROVENTIL VENTOLIN) nebulizer solution 2.5 mg  2.5 mg Nebulization Q2H PRN    fluticasone-vilanterol (BREO ELLIPTA) 100mcg-25mcg/puff  1 Puff Inhalation DAILY    zolpidem (AMBIEN) tablet 5 mg  5 mg Oral QHS PRN    nicotine (NICODERM CQ) 21 mg/24 hr patch 1 Patch  1 Patch TransDERmal DAILY    guaiFENesin ER (MUCINEX) tablet 600 mg  600 mg Oral BID    albuterol-ipratropium (DUO-NEB) 2.5 MG-0.5 MG/3 ML  3 mL Nebulization Q4HWA RT    ibuprofen (MOTRIN) tablet 400 mg  400 mg Oral Q6H PRN            Lab Review:     Recent Labs      08/20/17   1019   WBC  9.7   HGB  14.7   HCT  43.4   PLT  303     Recent Labs      08/20/17   1019   NA  142   K  3.7   CL  105   CO2  28   GLU  114*   BUN  13   CREA  0.81   CA  9.2     No results found for: GLUCPOC  Recent Labs      08/20/17   1152   PH  7.50*   PCO2  32*   PO2  62*   HCO3  24   FIO2  21     No results for input(s): INR in the last 72 hours.     No lab exists for component: INREXT, INREXT  No results found for: SDES  No results found for: CULT         Assessment:     Principal Problem:    COPD exacerbation ([facility] (8/20/2017)    Active Problems:    Depression (11/9/2016)      Fibromyalgia (11/10/2016)      Hyperlipidemia (8/20/2017)      Pneumonia (8/22/2017)           Plan: Principal Problem:    COPD exacerbation ([facility] (8/20/2017)/Pneumonia   - pneumonia seen on CT yesterday   - started on levofloxacin by Pulmonary yesterday for sinusitis but will cover pneumonia adequately   - slow improvement, continue nebs, steroids, etc    Active Problems:    Depression (11/9/2016)/Fibromyalgia (11/10/2016)   - not on psych meds      Hyperlipidemia (8/20/2017)   - not on statin, outpatient follow up with PCP      Total time spent with patient: 25 minutes                  Care Plan discussed with: Patient, Care Manager and Nursing Staff    Discussed:  Code Status, Care Plan and D/C Planning    Prophylaxis:  Lovenox    Disposition:  Home w/Family           ___________________________________________________    Attending Physician: Harman Ruffin MD

## 2017-08-22 NOTE — PROGRESS NOTES
[facility]   : 1960 Admit Date: 2017   Phone: [phone]  Room: [facility]/   PCP: Joaquín Jordan MD  MRN: [id number]   Date: 2017  Code: Full Code          ROS: Ms. Sergio Bauman is feeling slightly better since being admitted to the hospital. She is less SOB with exertion. Still has dry, wheezy cough. Denies fever, chills, N/V/D. She is happy that there is a reason why she is feeling poorly. She is more concerned about possibly having COPD vs. current PNA, but she was very close with her brother who recently passed away with COPD. Discussed how we will follow up with her outpatient for repeat PFT's as her CT just showed mild emphysema. Overnight Events:  Afebrile  Oxygen saturations 98% on RA  BP and HR stable  ABG 17: pH 7.50, pCO2 32, pO2 62, HCO3 24, sO2 94 on RA  LE dopplers negative    No new labs today    Images:  CTA Chest 17: Ill-defined right upper lobe pneumonia is new since  and since 2017. Nonspecific inflammatory process is considered less likely. Mild centrilobular emphysema. No pulmonary embolism. CXR 17: no acute process. CTA Chest 2/2/15: No pulmonary emboli in the major pulmonary arteries. Some plaquing of the aortic arch superior laterally. Subtle tiny density posterior lateral superior segment RLL as before. Tiny density anterolateral RUL as before. No change since 2008 when considering technical differences.     Past Medical History:   Diagnosis Date    Anxiety     Depression     Diverticulitis     Hyperlipemia        Past Surgical History:   Procedure Laterality Date    HX  SECTION      HX HEART CATHETERIZATION      HX OTHER SURGICAL      knee surgery        Family History   Problem Relation Age of Onset    Heart Disease Mother     Hypertension Mother     COPD Father        Social History   Substance Use Topics    Smoking status: Current Every Day Smoker     Packs/day: 0.50    Smokeless tobacco: Never Used    Alcohol use No       Allergies   Allergen Reactions    Vicodin [Hydrocodone-Acetaminophen] Itching     States \"it makes my feet itch. \"       Current Facility-Administered Medications   Medication Dose Route Frequency    levoFLOXacin (LEVAQUIN) tablet 500 mg  500 mg Oral Q24H    guaiFENesin-codeine (ROBITUSSIN AC) 100-10 mg/5 mL solution 10 mL  10 mL Oral Q6H PRN    sodium chloride (NS) flush 5-10 mL  5-10 mL IntraVENous Q8H    sodium chloride (NS) flush 5-10 mL  5-10 mL IntraVENous PRN    benzonatate (TESSALON) capsule 100 mg  100 mg Oral TID PRN    sodium chloride (NS) flush 5-10 mL  5-10 mL IntraVENous Q8H    sodium chloride (NS) flush 5-10 mL  5-10 mL IntraVENous PRN    enoxaparin (LOVENOX) injection 40 mg  40 mg SubCUTAneous DAILY    methylPREDNISolone (PF) (SOLU-MEDROL) injection 40 mg  40 mg IntraVENous Q6H    albuterol (PROVENTIL VENTOLIN) nebulizer solution 2.5 mg  2.5 mg Nebulization Q2H PRN    fluticasone-vilanterol (BREO ELLIPTA) 100mcg-25mcg/puff  1 Puff Inhalation DAILY    zolpidem (AMBIEN) tablet 5 mg  5 mg Oral QHS PRN    nicotine (NICODERM CQ) 21 mg/24 hr patch 1 Patch  1 Patch TransDERmal DAILY    guaiFENesin ER (MUCINEX) tablet 600 mg  600 mg Oral BID    albuterol-ipratropium (DUO-NEB) 2.5 MG-0.5 MG/3 ML  3 mL Nebulization Q4HWA RT    ibuprofen (MOTRIN) tablet 400 mg  400 mg Oral Q6H PRN         REVIEW OF SYSTEMS   12 point ROS negative except as stated in the HPI. Physical Exam:   Visit Vitals    /88 (BP 1 Location: Left arm, BP Patient Position: At rest)    Pulse 100    Temp 98.7 °F (37.1 °C)    Resp 18    Ht 5' 2\" (1.575 m)    Wt 59 kg (130 lb)    SpO2 98%    BMI 23.78 kg/m2       General:  Alert, cooperative, no distress, appears stated age. Head:  Normocephalic, without obvious abnormality, atraumatic. Eyes:  Conjunctivae/corneas clear. Nose: Nares normal. Septum midline.  Mucosa normal.    Throat: Lips, mucosa, and tongue normal.    Neck: Supple, symmetrical, trachea midline, no adenopathy. Lungs:   Clear to auscultation bilaterally. Chest wall:  No tenderness or deformity. Heart:  Regular rate and rhythm, S1, S2 normal, no murmur, click, rub or gallop. Abdomen:   Soft, non-tender. Bowel sounds normal. No masses,  No organomegaly. Extremities: Extremities normal, atraumatic, no cyanosis or edema. Pulses: 2+ and symmetric all extremities. Skin: Skin color, texture, turgor normal. No rashes or lesions   Lymph nodes: Cervical, supraclavicular nodes normal.   Neurologic: Grossly nonfocal       Lab Results   Component Value Date/Time    Sodium 142 08/20/2017 10:19 AM    Potassium 3.7 08/20/2017 10:19 AM    Chloride 105 08/20/2017 10:19 AM    CO2 28 08/20/2017 10:19 AM    BUN 13 08/20/2017 10:19 AM    Creatinine 0.81 08/20/2017 10:19 AM    Glucose 114 08/20/2017 10:19 AM    Calcium 9.2 08/20/2017 10:19 AM       Lab Results   Component Value Date/Time    WBC 9.7 08/20/2017 10:19 AM    HGB 14.7 08/20/2017 10:19 AM    PLATELET 899 68/59/6277 10:19 AM    MCV 85.3 08/20/2017 10:19 AM       Lab Results   Component Value Date/Time    AST (SGOT) 16 08/17/2017 04:56 PM    Alk.  phosphatase 104 08/17/2017 04:56 PM    Protein, total 7.1 08/17/2017 04:56 PM    Albumin 3.6 08/17/2017 04:56 PM    Globulin 3.5 08/17/2017 04:56 PM       No results found for: IRON, FE, TIBC, IBCT, PSAT, FERR    Lab Results   Component Value Date/Time    TSH 1.07 11/09/2016 04:27 PM        No results found for: PH, PHI, PCO2, PCO2I, PO2, PO2I, HCO3, HCO3I, FIO2, FIO2I    No results found for: CPK, RCK1, RCK2, RCK3, RCK4, CKNDX, CKND1, TROPT, TROIQ, BNPP, BNP     No results found for: CULT    No results found for: TOXA1, RPR, HBCM, HBSAG, HAAB, HCAB1, HAAT, G6PD, CRYAC, HIVGT, HIVR, HIV1, HIV12, HIVPC, HIVRPI    No results found for: VANCT, CPK    Lab Results   Component Value Date/Time    Color YELLOW/STRAW 11/09/2016 03:38 PM    Appearance CLEAR 11/09/2016 03:38 PM    Specific gravity 1.005 09/04/2016 11:34 AM    pH (UA) 6.5 11/09/2016 03:38 PM    Protein NEGATIVE  11/09/2016 03:38 PM    Glucose NEGATIVE  11/09/2016 03:38 PM    Ketone NEGATIVE  11/09/2016 03:38 PM    Bilirubin NEGATIVE  11/09/2016 03:38 PM    Blood SMALL 11/09/2016 03:38 PM    Urobilinogen 0.2 11/09/2016 03:38 PM    Nitrites NEGATIVE  11/09/2016 03:38 PM    Leukocyte Esterase NEGATIVE  11/09/2016 03:38 PM    WBC 0-4 11/09/2016 03:38 PM    RBC 0-5 11/09/2016 03:38 PM    Bacteria NEGATIVE  11/09/2016 03:38 PM       IMPRESSION  · SOB  · Abnormal chest imaging: RUL PNA  · Sinus infection  · Diverticulitis  · Anxiety/Depression  · Current smoker    PLAN  · Maintain oxygen saturations > 90%  · Continue Levaquin  · Ordered RVP and sputum culture  · Continue DuoNebs and 40 mg Q8 IV SoluMedrol  · Continue Breo 100  · Continue Mucinex  · Smoking cessation and nicotine patch  · Would recommend outpatient pulmonary follow up with repeat PFT's and repeat scan  · DVT prophylaxis: Lovenox  · GI prophylaxis: not indicated    Bhakti Escudero NP

## 2017-08-22 NOTE — ROUTINE PROCESS
Bedside and Verbal shift change report given to Petra 1 (oncoming nurse) by Antolin Mohr RN (offgoing nurse). Report included the following information SBAR, Kardex, MAR, Accordion and Recent Results.

## 2017-08-22 NOTE — PROGRESS NOTES
Bedside and Verbal shift change report given to Jeane Spence RN (oncoming nurse) by Shai De Leon RN (offgoing nurse). Report included the following information SBAR, Kardex, Intake/Output, MAR, Accordion and Recent Results.

## 2017-08-23 VITALS
WEIGHT: 130 LBS | DIASTOLIC BLOOD PRESSURE: 66 MMHG | OXYGEN SATURATION: 93 % | HEIGHT: 62 IN | TEMPERATURE: 98.4 F | HEART RATE: 111 BPM | RESPIRATION RATE: 22 BRPM | SYSTOLIC BLOOD PRESSURE: 135 MMHG | BODY MASS INDEX: 23.92 KG/M2

## 2017-08-23 LAB
ANION GAP SERPL CALC-SCNC: 6 MMOL/L (ref 5–15)
BUN SERPL-MCNC: 16 MG/DL (ref 6–20)
BUN/CREAT SERPL: 18 (ref 12–20)
CALCIUM SERPL-MCNC: 8.7 MG/DL (ref 8.5–10.1)
CHLORIDE SERPL-SCNC: 105 MMOL/L (ref 97–108)
CO2 SERPL-SCNC: 30 MMOL/L (ref 21–32)
CREAT SERPL-MCNC: 0.88 MG/DL (ref 0.55–1.02)
ERYTHROCYTE [DISTWIDTH] IN BLOOD BY AUTOMATED COUNT: 14 % (ref 11.5–14.5)
GLUCOSE SERPL-MCNC: 162 MG/DL (ref 65–100)
HCT VFR BLD AUTO: 39.3 % (ref 35–47)
HGB BLD-MCNC: 13 G/DL (ref 11.5–16)
MAGNESIUM SERPL-MCNC: 2.1 MG/DL (ref 1.6–2.4)
MCH RBC QN AUTO: 28.5 PG (ref 26–34)
MCHC RBC AUTO-ENTMCNC: 33.1 G/DL (ref 30–36.5)
MCV RBC AUTO: 86.2 FL (ref 80–99)
PHOSPHATE SERPL-MCNC: 4.4 MG/DL (ref 2.6–4.7)
PLATELET # BLD AUTO: 217 K/UL (ref 150–400)
POTASSIUM SERPL-SCNC: 3.8 MMOL/L (ref 3.5–5.1)
RBC # BLD AUTO: 4.56 M/UL (ref 3.8–5.2)
SODIUM SERPL-SCNC: 141 MMOL/L (ref 136–145)
WBC # BLD AUTO: 11.2 K/UL (ref 3.6–11)

## 2017-08-23 PROCEDURE — 80048 BASIC METABOLIC PNL TOTAL CA: CPT | Performed by: INTERNAL MEDICINE

## 2017-08-23 PROCEDURE — 74011250637 HC RX REV CODE- 250/637: Performed by: INTERNAL MEDICINE

## 2017-08-23 PROCEDURE — 74011000250 HC RX REV CODE- 250: Performed by: INTERNAL MEDICINE

## 2017-08-23 PROCEDURE — 74011250637 HC RX REV CODE- 250/637: Performed by: NURSE PRACTITIONER

## 2017-08-23 PROCEDURE — 94640 AIRWAY INHALATION TREATMENT: CPT

## 2017-08-23 PROCEDURE — 94761 N-INVAS EAR/PLS OXIMETRY MLT: CPT

## 2017-08-23 PROCEDURE — 36415 COLL VENOUS BLD VENIPUNCTURE: CPT | Performed by: INTERNAL MEDICINE

## 2017-08-23 PROCEDURE — 85027 COMPLETE CBC AUTOMATED: CPT | Performed by: INTERNAL MEDICINE

## 2017-08-23 PROCEDURE — 74011250636 HC RX REV CODE- 250/636: Performed by: INTERNAL MEDICINE

## 2017-08-23 PROCEDURE — 83735 ASSAY OF MAGNESIUM: CPT | Performed by: INTERNAL MEDICINE

## 2017-08-23 PROCEDURE — 74011250636 HC RX REV CODE- 250/636: Performed by: NURSE PRACTITIONER

## 2017-08-23 PROCEDURE — 84100 ASSAY OF PHOSPHORUS: CPT | Performed by: INTERNAL MEDICINE

## 2017-08-23 RX ORDER — NEBULIZER AND COMPRESSOR
EACH MISCELLANEOUS
Qty: 1 EACH | Refills: 0 | Status: SHIPPED | OUTPATIENT
Start: 2017-08-23 | End: 2018-02-19

## 2017-08-23 RX ORDER — LEVOFLOXACIN 750 MG/1
750 TABLET ORAL DAILY
Qty: 3 TAB | Refills: 0 | Status: SHIPPED | OUTPATIENT
Start: 2017-08-23 | End: 2017-08-26

## 2017-08-23 RX ORDER — IPRATROPIUM BROMIDE AND ALBUTEROL SULFATE 2.5; .5 MG/3ML; MG/3ML
3 SOLUTION RESPIRATORY (INHALATION)
Qty: 120 NEBULE | Refills: 0 | Status: SHIPPED | OUTPATIENT
Start: 2017-08-23

## 2017-08-23 RX ORDER — GUAIFENESIN 600 MG/1
600 TABLET, EXTENDED RELEASE ORAL 2 TIMES DAILY
Qty: 60 TAB | Refills: 0 | Status: SHIPPED | OUTPATIENT
Start: 2017-08-23 | End: 2018-02-19

## 2017-08-23 RX ORDER — PREDNISONE 20 MG/1
TABLET ORAL
Qty: 30 TAB | Refills: 0 | Status: ON HOLD | OUTPATIENT
Start: 2017-08-23 | End: 2018-02-21

## 2017-08-23 RX ORDER — FLUTICASONE FUROATE AND VILANTEROL 100; 25 UG/1; UG/1
1 POWDER RESPIRATORY (INHALATION) DAILY
Qty: 1 INHALER | Refills: 0 | Status: SHIPPED | OUTPATIENT
Start: 2017-08-23 | End: 2018-02-19

## 2017-08-23 RX ORDER — CODEINE PHOSPHATE AND GUAIFENESIN 10; 100 MG/5ML; MG/5ML
10 SOLUTION ORAL
Qty: 240 ML | Refills: 0 | Status: SHIPPED | OUTPATIENT
Start: 2017-08-23 | End: 2018-02-19

## 2017-08-23 RX ORDER — NYSTATIN 100000 [USP'U]/ML
500000 SUSPENSION ORAL 3 TIMES DAILY
Status: DISCONTINUED | OUTPATIENT
Start: 2017-08-23 | End: 2017-08-23 | Stop reason: HOSPADM

## 2017-08-23 RX ADMIN — ZOLPIDEM TARTRATE 5 MG: 5 TABLET ORAL at 00:12

## 2017-08-23 RX ADMIN — METHYLPREDNISOLONE SODIUM SUCCINATE 40 MG: 40 INJECTION, POWDER, FOR SOLUTION INTRAMUSCULAR; INTRAVENOUS at 00:12

## 2017-08-23 RX ADMIN — GUAIFENESIN 600 MG: 600 TABLET, EXTENDED RELEASE ORAL at 09:13

## 2017-08-23 RX ADMIN — IPRATROPIUM BROMIDE AND ALBUTEROL SULFATE 3 ML: .5; 3 SOLUTION RESPIRATORY (INHALATION) at 07:25

## 2017-08-23 RX ADMIN — ENOXAPARIN SODIUM 40 MG: 40 INJECTION SUBCUTANEOUS at 09:14

## 2017-08-23 RX ADMIN — Medication 10 ML: at 14:00

## 2017-08-23 RX ADMIN — NYSTATIN 500000 UNITS: 100000 SUSPENSION ORAL at 16:31

## 2017-08-23 RX ADMIN — GUAIFENESIN AND CODEINE PHOSPHATE 10 ML: 10; 100 LIQUID ORAL at 09:14

## 2017-08-23 RX ADMIN — Medication 10 ML: at 16:31

## 2017-08-23 RX ADMIN — Medication 10 ML: at 00:13

## 2017-08-23 RX ADMIN — NYSTATIN 500000 UNITS: 100000 SUSPENSION ORAL at 04:48

## 2017-08-23 RX ADMIN — Medication 10 ML: at 06:15

## 2017-08-23 RX ADMIN — METHYLPREDNISOLONE SODIUM SUCCINATE 40 MG: 40 INJECTION, POWDER, FOR SOLUTION INTRAMUSCULAR; INTRAVENOUS at 09:14

## 2017-08-23 RX ADMIN — NYSTATIN 500000 UNITS: 100000 SUSPENSION ORAL at 09:45

## 2017-08-23 RX ADMIN — Medication 10 ML: at 00:12

## 2017-08-23 RX ADMIN — GUAIFENESIN AND CODEINE PHOSPHATE 10 ML: 10; 100 LIQUID ORAL at 03:50

## 2017-08-23 RX ADMIN — LEVOFLOXACIN 500 MG: 500 TABLET, FILM COATED ORAL at 16:30

## 2017-08-23 RX ADMIN — ALBUTEROL SULFATE 2.5 MG: 2.5 SOLUTION RESPIRATORY (INHALATION) at 03:50

## 2017-08-23 RX ADMIN — IPRATROPIUM BROMIDE AND ALBUTEROL SULFATE 3 ML: .5; 3 SOLUTION RESPIRATORY (INHALATION) at 15:28

## 2017-08-23 RX ADMIN — IPRATROPIUM BROMIDE AND ALBUTEROL SULFATE 3 ML: .5; 3 SOLUTION RESPIRATORY (INHALATION) at 12:09

## 2017-08-23 NOTE — DISCHARGE SUMMARY
Physician Discharge Summary     Patient ID:  Reynold Wright  [id number]  60 y.o.  1960    Admit date: 8/20/2017    Discharge date: 8/23/2017    Admission Diagnoses: COPD (chronic obstructive pulmonary disease) ([facility]  COPD exacerbation ([facility]    Discharge Diagnoses:  Principal Diagnosis COPD exacerbation ([facility]                                            Principal Problem:    COPD exacerbation ([facility] (8/20/2017)    Active Problems:    Depression (11/9/2016)      Fibromyalgia (11/10/2016)      Hyperlipidemia (8/20/2017)      Pneumonia (8/22/2017)         Resolved Problems:  Problem List as of 8/23/2017  Date Reviewed: 8/20/2017          Codes Class Noted - Resolved    Pneumonia ICD-10-CM: J18.9  ICD-9-CM: 486  8/22/2017 - Present        COPD (chronic obstructive pulmonary disease) ([facility] (Chronic) ICD-10-CM: J44.9  ICD-9-CM: 675  8/20/2017 - Present        Hyperlipidemia (Chronic) ICD-10-CM: E78.5  ICD-9-CM: 272.4  8/20/2017 - Present        * (Principal)COPD exacerbation ([facility] ICD-10-CM: J44.1  ICD-9-CM: 491.21  8/20/2017 - Present        Borderline personality disorder ICD-10-CM: F60.3  ICD-9-CM: 301.83  11/10/2016 - Present        Fibromyalgia (Chronic) ICD-10-CM: M79.7  ICD-9-CM: 729.1  11/10/2016 - Present        Depression (Chronic) ICD-10-CM: F32.9  ICD-9-CM: 731  11/9/2016 - Present                Hospital Course:   Ms. Lorrin Scheuermann was admitted to the Hospitalist Service on the 5th floor for treatment of acute COPD. She improved very slowly but remained dyspneic though not hypoxic at rest.  She was evaluated by Pulmonary and CT of the chest showed pneumonia. She was started on IV levofloxacin and continued to improve quite slowly. She was hypoxic with exertion and required 3 LPM to maintain her sats and home oxygen was arranged. She was discharged home on 8/23/2017 in improved condition.        PCP: Esme Rossi MD    Consults: Pulmonary/Intensive care    Discharge Exam:  See my Progress Note from today.    Disposition: home    Patient Instructions:   Current Discharge Medication List      START taking these medications    Details   fluticasone-vilanterol (BREO ELLIPTA) 100-25 mcg/dose inhaler Take 1 Puff by inhalation daily. Qty: 1 Inhaler, Refills: 0      guaiFENesin ER (MUCINEX) 600 mg ER tablet Take 1 Tab by mouth two (2) times a day. Qty: 60 Tab, Refills: 0      guaiFENesin-codeine (ROBITUSSIN AC) 100-10 mg/5 mL solution Take 10 mL by mouth every six (6) hours as needed for Cough. Max Daily Amount: 40 mL. Qty: 240 mL, Refills: 0      Nebulizer & Compressor machine As directed  Qty: 1 Each, Refills: 0      Nebulizer Accessories kit As directed  Qty: 1 Kit, Refills: 0      albuterol-ipratropium (DUO-NEB) 2.5 mg-0.5 mg/3 ml nebu 3 mL by Nebulization route every 4 hours daily while awake resp. Qty: 120 Nebule, Refills: 0      levoFLOXacin (LEVAQUIN) 750 mg tablet Take 1 Tab by mouth daily for 3 days. Qty: 3 Tab, Refills: 0         CONTINUE these medications which have CHANGED    Details   predniSONE (DELTASONE) 20 mg tablet 2 tabs twice a day for 4 days, then 1 tab twice a day for 4 days, then 1 tab daily for 4 days, then 1/2 tab daily for 4 days. (30 tabs, 16 days total)  Qty: 30 Tab, Refills: 0         CONTINUE these medications which have NOT CHANGED    Details   albuterol (PROVENTIL HFA, VENTOLIN HFA, PROAIR HFA) 90 mcg/actuation inhaler Take 2 Puffs by inhalation every four (4) hours as needed for Wheezing. Qty: 1 Inhaler, Refills: 0         STOP taking these medications       benzonatate (TESSALON PERLES) 100 mg capsule Comments:   Reason for Stopping:              Activity: Activity as tolerated  Diet: Cardiac Diet  Wound Care: None needed    Follow-up Information     Follow up With Details Comments [address] [facility], [address] [facility]  [phone]            35 minutes were spend on this discharge.     Signed:  Mery Vargas MD  8/24/2017  2:38 PM

## 2017-08-23 NOTE — PROGRESS NOTES
Patient discharged per MD order, discharge orders and prescription reviewed with patient. Patient verbalized understanding,  IV removed with tip intact. Opportunity to ask questions provided, patient discharged home via wheelchair.

## 2017-08-23 NOTE — PROGRESS NOTES
Room air oxygen saturation at rest =91%    On room air (walking) oxygen saturation dropped to 84%    On 1 lpm nasal cannula (walking) oxygen saturation=87%    2 lpm nasal cannula (walking) saturation =89 %      3 lpm nasal cannula(walking) saturation=91%

## 2017-08-23 NOTE — PROGRESS NOTES
1525:  Pt accepted by [facility]. Obtained portable tank from [facility]. All paperwork completed with receipt to pt. Nebulizer will be delivered to pt's home. [employer] [place]    CM Note:  Orders for DME noted. Pt had no preference for company. Referrals were sent in [facility] to [facility]. Awaiting reply.   [employer] [place]

## 2017-08-23 NOTE — ROUTINE PROCESS
Bedside and Verbal shift change report given to [address] [place] [address]  (oncoming nurse) by Jayson Martin (offgoing nurse). Report included the following information SBAR, Kardex, Intake/Output and Recent Results.

## 2017-08-23 NOTE — PROGRESS NOTES
Johnny Peres [address]iro [address]  [address]  [phone]      Medical Progress Note      NAME: Jolene Bawja   :  1960  MRM:  [id number]    Date/Time: 2017  11:39 AM           Subjective:     Chief Complaint:  SOB: moderate, constant, slightly improved, associated with cough    ROS:  (bold if positive, if negative)       Cough         SOB/DAVIS        Tolerating Diet          Objective:       Vitals:          Last 24hrs VS reviewed since prior progress note.  Most recent are:    Visit Vitals    /85 (BP 1 Location: Left arm, BP Patient Position: Post activity;Supine)    Pulse 99    Temp 98 °F (36.7 °C)    Resp 22    Ht 5' 2\" (1.575 m)    Wt 59 kg (130 lb)    SpO2 92%    BMI 23.78 kg/m2     SpO2 Readings from Last 6 Encounters:   17 92%   17 100%   12/10/16 99%   16 99%   16 97%   16 96%        No intake or output data in the 24 hours ending 17 1139       Exam:     Physical Exam:    Gen:  Well-developed, well-nourished, in no acute distress  HEENT:  Pink conjunctivae, PERRL, hearing intact to voice, moist mucous membranes  Neck:  Supple, without masses, thyroid non-tender  Resp:  No accessory muscle use, frequent coughing but clear breath sounds  Card:  No murmurs, normal S1, S2 without thrills, bruits or peripheral edema  Abd:  Soft, non-tender, non-distended, normoactive bowel sounds are present, no palpable organomegaly and no detectable hernias  Lymph:  No cervical or inguinal adenopathy  Musc:  No cyanosis or clubbing  Skin:  No rashes or ulcers, skin turgor is good  Neuro:  Cranial nerves are grossly intact, no focal motor weakness, follows commands appropriately  Psych:  Good insight, oriented to person, place and time, alert    Medications Reviewed: (see below)    Lab Data Reviewed: (see below)    ______________________________________________________________________    Medications:     Current Facility-Administered Medications   Medication Dose Route Frequency    nystatin (MYCOSTATIN) 100,000 unit/mL oral suspension 500,000 Units  500,000 Units Oral TID    methylPREDNISolone (PF) (SOLU-MEDROL) injection 40 mg  40 mg IntraVENous Q8H    levoFLOXacin (LEVAQUIN) tablet 500 mg  500 mg Oral Q24H    guaiFENesin-codeine (ROBITUSSIN AC) 100-10 mg/5 mL solution 10 mL  10 mL Oral Q6H PRN    sodium chloride (NS) flush 5-10 mL  5-10 mL IntraVENous Q8H    sodium chloride (NS) flush 5-10 mL  5-10 mL IntraVENous PRN    benzonatate (TESSALON) capsule 100 mg  100 mg Oral TID PRN    sodium chloride (NS) flush 5-10 mL  5-10 mL IntraVENous Q8H    sodium chloride (NS) flush 5-10 mL  5-10 mL IntraVENous PRN    enoxaparin (LOVENOX) injection 40 mg  40 mg SubCUTAneous DAILY    albuterol (PROVENTIL VENTOLIN) nebulizer solution 2.5 mg  2.5 mg Nebulization Q2H PRN    fluticasone-vilanterol (BREO ELLIPTA) 100mcg-25mcg/puff  1 Puff Inhalation DAILY    zolpidem (AMBIEN) tablet 5 mg  5 mg Oral QHS PRN    nicotine (NICODERM CQ) 21 mg/24 hr patch 1 Patch  1 Patch TransDERmal DAILY    guaiFENesin ER (MUCINEX) tablet 600 mg  600 mg Oral BID    albuterol-ipratropium (DUO-NEB) 2.5 MG-0.5 MG/3 ML  3 mL Nebulization Q4HWA RT    ibuprofen (MOTRIN) tablet 400 mg  400 mg Oral Q6H PRN            Lab Review:     Recent Labs      08/23/17   0515   WBC  11.2*   HGB  13.0   HCT  39.3   PLT  217     Recent Labs      08/23/17   0515   NA  141   K  3.8   CL  105   CO2  30   GLU  162*   BUN  16   CREA  0.88   CA  8.7   MG  2.1   PHOS  4.4     No results found for: GLUCPOC  Recent Labs      08/20/17   1152   PH  7.50*   PCO2  32*   PO2  62*   HCO3  24   FIO2  21     No results for input(s): INR in the last 72 hours.     No lab exists for component: INREXT, INREXT  No results found for: SDES  No results found for: CULT         Assessment:     Principal Problem:    COPD exacerbation ([facility] [address] (8/20/2017)    Active Problems:    Depression (11/9/2016)      Fibromyalgia (11/10/2016)      Hyperlipidemia (8/20/2017)      Pneumonia (8/22/2017)           Plan:     Principal Problem:    COPD exacerbation ([address] (8/20/2017)/Pneumonia   - pneumonia seen on CT    - continue levofloxacin   - while not hypoxic at rest, she is markedly hypoxic with minimal exertion   - arranging home oxygen, I discussed this face-to-face with her and she is in agreement    Active Problems:    Depression (11/9/2016)/Fibromyalgia (11/10/2016)   - not on psych meds      Hyperlipidemia (8/20/2017)   - not on statin, outpatient follow up with PCP      Total time spent with patient: 25 minutes                  Care Plan discussed with: Patient, Care Manager and Nursing Staff    Discussed:  Code Status, Care Plan and D/C Planning    Prophylaxis:  Lovenox    Disposition:  Home w/Family           ___________________________________________________    Attending Physician: Thomas Wilson MD

## 2017-08-23 NOTE — PROGRESS NOTES
Patient complained of sores in mouth and gum assessment completed white splotches on right side upper palate and right inner gum areas noted. Hospitalist called and nystantin order given via telaphone read back.

## 2017-08-23 NOTE — PROGRESS NOTES
Name: [facility]: [address]   : 1960 Admit Date: 2017   Phone: [phone]  Room: [facility]/   PCP: Nithya Dumont MD  MRN: [id number]   Date: 2017  Code: Full Code          ROS: Ms. Griselda Baig is feeling slightly better since being admitted to the hospital. She is less SOB with exertion. Still has dry, wheezy cough. Denies fever, chills, N/V/D. She is happy that there is a reason why she is feeling poorly. She is more concerned about possibly having COPD vs. current PNA, but she was very close with her brother who recently passed away with COPD. Discussed how we will follow up with her outpatient for repeat PFT's as her CT just showed mild emphysema. Exercise oximetry completed this morning and minimum oxygen saturations on RA with walking 84%. She maintained oxygen saturations > 90% with exertion at 3L NC. Overnight Events:  Afebrile  Oxygen saturations 92% on RA  BP and HR stable  ABG 17: pH 7.50, pCO2 32, pO2 62, HCO3 24, sO2 94 on RA  LE dopplers negative  WBC 11.2  HgB 13.0  Na 141  K 3.8    Creatinine 0.88  Phos 4.4  Mg 2.1    Images:  CTA Chest 17: Ill-defined right upper lobe pneumonia is new since  and since 2017. Nonspecific inflammatory process is considered less likely. Mild centrilobular emphysema. No pulmonary embolism. CXR 17: no acute process. CTA Chest 2/2/15: No pulmonary emboli in the major pulmonary arteries. Some plaquing of the aortic arch superior laterally. Subtle tiny density posterior lateral superior segment RLL as before. Tiny density anterolateral RUL as before. No change since 2008 when considering technical differences.     Past Medical History:   Diagnosis Date    Anxiety     Depression     Diverticulitis     Hyperlipemia        Past Surgical History:   Procedure Laterality Date    HX  SECTION      HX HEART CATHETERIZATION      HX OTHER SURGICAL      knee surgery Family History   Problem Relation Age of Onset    Heart Disease Mother     Hypertension Mother     COPD Father        Social History   Substance Use Topics    Smoking status: Current Every Day Smoker     Packs/day: 0.50    Smokeless tobacco: Never Used    Alcohol use No       Allergies   Allergen Reactions    Vicodin [Hydrocodone-Acetaminophen] Itching     States \"it makes my feet itch. \"       Current Facility-Administered Medications   Medication Dose Route Frequency    nystatin (MYCOSTATIN) 100,000 unit/mL oral suspension 500,000 Units  500,000 Units Oral TID    methylPREDNISolone (PF) (SOLU-MEDROL) injection 40 mg  40 mg IntraVENous Q8H    levoFLOXacin (LEVAQUIN) tablet 500 mg  500 mg Oral Q24H    guaiFENesin-codeine (ROBITUSSIN AC) 100-10 mg/5 mL solution 10 mL  10 mL Oral Q6H PRN    sodium chloride (NS) flush 5-10 mL  5-10 mL IntraVENous Q8H    sodium chloride (NS) flush 5-10 mL  5-10 mL IntraVENous PRN    benzonatate (TESSALON) capsule 100 mg  100 mg Oral TID PRN    sodium chloride (NS) flush 5-10 mL  5-10 mL IntraVENous Q8H    sodium chloride (NS) flush 5-10 mL  5-10 mL IntraVENous PRN    enoxaparin (LOVENOX) injection 40 mg  40 mg SubCUTAneous DAILY    albuterol (PROVENTIL VENTOLIN) nebulizer solution 2.5 mg  2.5 mg Nebulization Q2H PRN    fluticasone-vilanterol (BREO ELLIPTA) 100mcg-25mcg/puff  1 Puff Inhalation DAILY    zolpidem (AMBIEN) tablet 5 mg  5 mg Oral QHS PRN    nicotine (NICODERM CQ) 21 mg/24 hr patch 1 Patch  1 Patch TransDERmal DAILY    guaiFENesin ER (MUCINEX) tablet 600 mg  600 mg Oral BID    albuterol-ipratropium (DUO-NEB) 2.5 MG-0.5 MG/3 ML  3 mL Nebulization Q4HWA RT    ibuprofen (MOTRIN) tablet 400 mg  400 mg Oral Q6H PRN         REVIEW OF SYSTEMS   12 point ROS negative except as stated in the HPI.       Physical Exam:   Visit Vitals    /85 (BP 1 Location: Left arm, BP Patient Position: Post activity;Supine)    Pulse 99    Temp 98 °F (36.7 °C)    Resp 22    Ht 5' 2\" (1.575 m)    Wt 59 kg (130 lb)    SpO2 92%    BMI 23.78 kg/m2       General:  Alert, cooperative, no distress, appears stated age. Head:  Normocephalic, without obvious abnormality, atraumatic. Eyes:  Conjunctivae/corneas clear. Nose: Nares normal. Septum midline. Mucosa normal.    Throat: Lips, mucosa, and tongue normal.    Neck: Supple, symmetrical, trachea midline, no adenopathy. Lungs:   Clear to auscultation bilaterally. Chest wall:  No tenderness or deformity. Heart:  Regular rate and rhythm, S1, S2 normal, no murmur, click, rub or gallop. Abdomen:   Soft, non-tender. Bowel sounds normal. No masses,  No organomegaly. Extremities: Extremities normal, atraumatic, no cyanosis or edema. Pulses: 2+ and symmetric all extremities. Skin: Skin color, texture, turgor normal. No rashes or lesions   Lymph nodes: Cervical, supraclavicular nodes normal.   Neurologic: Grossly nonfocal       Lab Results   Component Value Date/Time    Sodium 141 08/23/2017 05:15 AM    Potassium 3.8 08/23/2017 05:15 AM    Chloride 105 08/23/2017 05:15 AM    CO2 30 08/23/2017 05:15 AM    BUN 16 08/23/2017 05:15 AM    Creatinine 0.88 08/23/2017 05:15 AM    Glucose 162 08/23/2017 05:15 AM    Calcium 8.7 08/23/2017 05:15 AM    Magnesium 2.1 08/23/2017 05:15 AM    Phosphorus 4.4 08/23/2017 05:15 AM       Lab Results   Component Value Date/Time    WBC 11.2 08/23/2017 05:15 AM    HGB 13.0 08/23/2017 05:15 AM    PLATELET 571 81/27/0295 05:15 AM    MCV 86.2 08/23/2017 05:15 AM       Lab Results   Component Value Date/Time    AST (SGOT) 16 08/17/2017 04:56 PM    Alk.  phosphatase 104 08/17/2017 04:56 PM    Protein, total 7.1 08/17/2017 04:56 PM    Albumin 3.6 08/17/2017 04:56 PM    Globulin 3.5 08/17/2017 04:56 PM       No results found for: IRON, FE, TIBC, IBCT, PSAT, FERR    Lab Results   Component Value Date/Time    TSH 1.07 11/09/2016 04:27 PM        No results found for: PH, PHI, PCO2, PCO2I, PO2, PO2I, HCO3, HCO3I, FIO2, FIO2I    No results found for: CPK, RCK1, RCK2, RCK3, RCK4, CKNDX, CKND1, TROPT, TROIQ, BNPP, BNP     No results found for: CULT    No results found for: TOXA1, RPR, HBCM, HBSAG, HAAB, HCAB1, HAAT, G6PD, CRYAC, HIVGT, HIVR, HIV1, HIV12, HIVPC, HIVRPI    No results found for: VANCT, CPK    Lab Results   Component Value Date/Time    Color YELLOW/STRAW 11/09/2016 03:38 PM    Appearance CLEAR 11/09/2016 03:38 PM    Specific gravity 1.005 09/04/2016 11:34 AM    pH (UA) 6.5 11/09/2016 03:38 PM    Protein NEGATIVE  11/09/2016 03:38 PM    Glucose NEGATIVE  11/09/2016 03:38 PM    Ketone NEGATIVE  11/09/2016 03:38 PM    Bilirubin NEGATIVE  11/09/2016 03:38 PM    Blood SMALL 11/09/2016 03:38 PM    Urobilinogen 0.2 11/09/2016 03:38 PM    Nitrites NEGATIVE  11/09/2016 03:38 PM    Leukocyte Esterase NEGATIVE  11/09/2016 03:38 PM    WBC 0-4 11/09/2016 03:38 PM    RBC 0-5 11/09/2016 03:38 PM    Bacteria NEGATIVE  11/09/2016 03:38 PM       IMPRESSION  · SOB  · Abnormal chest imaging: RUL PNA  · Sinus infection  · Diverticulitis  · Anxiety/Depression  · Current smoker    PLAN  · Maintain oxygen saturations > 90%  · Continue Levaquin for total of 7 days  · Continue DuoNebs  · Wean 40 mg to Q12 IV SoluMedrol; can go home on prednisone taper  · Continue Breo 100  · Continue Mucinex  · Smoking cessation and nicotine patch  · DVT prophylaxis: Lovenox  · GI prophylaxis: not indicated    Patient stable from a pulmonary standpoint. Needs to go home with exertional oxygen. She will follow up in 2 weeks with out patient pulmonary and then repeat PFT's and repeat chest CT in 6 weeks.      Adalberto Au NP

## 2017-11-28 ENCOUNTER — HOSPITAL ENCOUNTER (OUTPATIENT)
Dept: CT IMAGING | Age: 57
Discharge: HOME | End: 2017-11-28
Attending: NURSE PRACTITIONER
Payer: COMMERCIAL

## 2017-11-28 DIAGNOSIS — R93.89 ABNORMAL CHEST CT: ICD-10-CM

## 2017-11-28 PROCEDURE — 71250 CT THORAX DX C-: CPT

## 2018-02-19 ENCOUNTER — APPOINTMENT (OUTPATIENT)
Dept: GENERAL RADIOLOGY | Age: 58
DRG: 194 | End: 2018-02-19
Attending: PHYSICIAN ASSISTANT
Payer: COMMERCIAL

## 2018-02-19 ENCOUNTER — HOSPITAL ENCOUNTER (INPATIENT)
Age: 58
LOS: 2 days | Discharge: HOME | DRG: 194 | End: 2018-02-21
Attending: STUDENT IN AN ORGANIZED HEALTH CARE EDUCATION/TRAINING PROGRAM | Admitting: INTERNAL MEDICINE
Payer: COMMERCIAL

## 2018-02-19 DIAGNOSIS — R06.02 SOB (SHORTNESS OF BREATH): ICD-10-CM

## 2018-02-19 DIAGNOSIS — R09.02 HYPOXIA: ICD-10-CM

## 2018-02-19 DIAGNOSIS — J44.9 CHRONIC OBSTRUCTIVE PULMONARY DISEASE, UNSPECIFIED COPD TYPE (HCC): Primary | Chronic | ICD-10-CM

## 2018-02-19 PROBLEM — J18.9 PNEUMONIA: Status: RESOLVED | Noted: 2017-08-22 | Resolved: 2018-02-19

## 2018-02-19 PROBLEM — R00.0 SINUS TACHYCARDIA: Status: ACTIVE | Noted: 2018-02-19

## 2018-02-19 PROBLEM — E87.6 HYPOKALEMIA: Status: ACTIVE | Noted: 2018-02-19

## 2018-02-19 LAB
ALBUMIN SERPL-MCNC: 3.1 G/DL (ref 3.5–5)
ALBUMIN/GLOB SERPL: 0.9 {RATIO} (ref 1.1–2.2)
ALP SERPL-CCNC: 86 U/L (ref 45–117)
ALT SERPL-CCNC: 17 U/L (ref 12–78)
ANION GAP SERPL CALC-SCNC: 7 MMOL/L (ref 5–15)
AST SERPL-CCNC: 30 U/L (ref 15–37)
ATRIAL RATE: 88 BPM
B PERT DNA SPEC QL NAA+PROBE: NOT DETECTED
BASOPHILS # BLD: 0 K/UL (ref 0–0.1)
BASOPHILS NFR BLD: 0 % (ref 0–1)
BILIRUB SERPL-MCNC: 0.5 MG/DL (ref 0.2–1)
BUN SERPL-MCNC: 14 MG/DL (ref 6–20)
BUN/CREAT SERPL: 18 (ref 12–20)
C PNEUM DNA SPEC QL NAA+PROBE: NOT DETECTED
CALCIUM SERPL-MCNC: 8.5 MG/DL (ref 8.5–10.1)
CALCULATED P AXIS, ECG09: 47 DEGREES
CALCULATED R AXIS, ECG10: 20 DEGREES
CALCULATED T AXIS, ECG11: 60 DEGREES
CHLORIDE SERPL-SCNC: 101 MMOL/L (ref 97–108)
CO2 SERPL-SCNC: 31 MMOL/L (ref 21–32)
CREAT SERPL-MCNC: 0.76 MG/DL (ref 0.55–1.02)
DIAGNOSIS, 93000: NORMAL
DIFFERENTIAL METHOD BLD: ABNORMAL
EOSINOPHIL # BLD: 0 K/UL (ref 0–0.4)
EOSINOPHIL NFR BLD: 0 % (ref 0–7)
ERYTHROCYTE [DISTWIDTH] IN BLOOD BY AUTOMATED COUNT: 13.3 % (ref 11.5–14.5)
FLUAV H1 2009 PAND RNA SPEC QL NAA+PROBE: DETECTED
FLUAV H1 RNA SPEC QL NAA+PROBE: NOT DETECTED
FLUAV H3 RNA SPEC QL NAA+PROBE: NOT DETECTED
FLUAV SUBTYP SPEC NAA+PROBE: NOT DETECTED
FLUBV RNA SPEC QL NAA+PROBE: NOT DETECTED
GLOBULIN SER CALC-MCNC: 3.4 G/DL (ref 2–4)
GLUCOSE SERPL-MCNC: 163 MG/DL (ref 65–100)
HADV DNA SPEC QL NAA+PROBE: NOT DETECTED
HCOV 229E RNA SPEC QL NAA+PROBE: NOT DETECTED
HCOV HKU1 RNA SPEC QL NAA+PROBE: NOT DETECTED
HCOV NL63 RNA SPEC QL NAA+PROBE: NOT DETECTED
HCOV OC43 RNA SPEC QL NAA+PROBE: NOT DETECTED
HCT VFR BLD AUTO: 39.8 % (ref 35–47)
HGB BLD-MCNC: 13 G/DL (ref 11.5–16)
HMPV RNA SPEC QL NAA+PROBE: NOT DETECTED
HPIV1 RNA SPEC QL NAA+PROBE: NOT DETECTED
HPIV2 RNA SPEC QL NAA+PROBE: NOT DETECTED
HPIV3 RNA SPEC QL NAA+PROBE: NOT DETECTED
HPIV4 RNA SPEC QL NAA+PROBE: NOT DETECTED
IMM GRANULOCYTES # BLD: 0 K/UL (ref 0–0.04)
IMM GRANULOCYTES NFR BLD AUTO: 0 % (ref 0–0.5)
LYMPHOCYTES # BLD: 0.6 K/UL (ref 0.8–3.5)
LYMPHOCYTES NFR BLD: 11 % (ref 12–49)
M PNEUMO DNA SPEC QL NAA+PROBE: NOT DETECTED
MCH RBC QN AUTO: 28.4 PG (ref 26–34)
MCHC RBC AUTO-ENTMCNC: 32.7 G/DL (ref 30–36.5)
MCV RBC AUTO: 86.9 FL (ref 80–99)
MONOCYTES # BLD: 0.2 K/UL (ref 0–1)
MONOCYTES NFR BLD: 4 % (ref 5–13)
NEUTS SEG # BLD: 4.6 K/UL (ref 1.8–8)
NEUTS SEG NFR BLD: 85 % (ref 32–75)
NRBC # BLD: 0 K/UL (ref 0–0.01)
NRBC BLD-RTO: 0 PER 100 WBC
P-R INTERVAL, ECG05: 158 MS
PLATELET # BLD AUTO: 151 K/UL (ref 150–400)
PMV BLD AUTO: 9.3 FL (ref 8.9–12.9)
POTASSIUM SERPL-SCNC: 3.1 MMOL/L (ref 3.5–5.1)
PROT SERPL-MCNC: 6.5 G/DL (ref 6.4–8.2)
Q-T INTERVAL, ECG07: 362 MS
QRS DURATION, ECG06: 76 MS
QTC CALCULATION (BEZET), ECG08: 438 MS
RBC # BLD AUTO: 4.58 M/UL (ref 3.8–5.2)
RBC MORPH BLD: ABNORMAL
RSV RNA SPEC QL NAA+PROBE: NOT DETECTED
RV+EV RNA SPEC QL NAA+PROBE: NOT DETECTED
SODIUM SERPL-SCNC: 139 MMOL/L (ref 136–145)
TROPONIN I SERPL-MCNC: <0.04 NG/ML
VENTRICULAR RATE, ECG03: 88 BPM
WBC # BLD AUTO: 5.4 K/UL (ref 3.6–11)

## 2018-02-19 PROCEDURE — 74011000250 HC RX REV CODE- 250: Performed by: NURSE PRACTITIONER

## 2018-02-19 PROCEDURE — 93005 ELECTROCARDIOGRAM TRACING: CPT

## 2018-02-19 PROCEDURE — 94640 AIRWAY INHALATION TREATMENT: CPT

## 2018-02-19 PROCEDURE — 36415 COLL VENOUS BLD VENIPUNCTURE: CPT | Performed by: PHYSICIAN ASSISTANT

## 2018-02-19 PROCEDURE — 77030013140 HC MSK NEB VYRM -A

## 2018-02-19 PROCEDURE — 74011250637 HC RX REV CODE- 250/637: Performed by: INTERNAL MEDICINE

## 2018-02-19 PROCEDURE — 65660000000 HC RM CCU STEPDOWN

## 2018-02-19 PROCEDURE — 71046 X-RAY EXAM CHEST 2 VIEWS: CPT

## 2018-02-19 PROCEDURE — 80053 COMPREHEN METABOLIC PANEL: CPT | Performed by: PHYSICIAN ASSISTANT

## 2018-02-19 PROCEDURE — 74011000250 HC RX REV CODE- 250: Performed by: INTERNAL MEDICINE

## 2018-02-19 PROCEDURE — 87798 DETECT AGENT NOS DNA AMP: CPT | Performed by: INTERNAL MEDICINE

## 2018-02-19 PROCEDURE — 74011000250 HC RX REV CODE- 250: Performed by: PHYSICIAN ASSISTANT

## 2018-02-19 PROCEDURE — 74011250636 HC RX REV CODE- 250/636: Performed by: INTERNAL MEDICINE

## 2018-02-19 PROCEDURE — 85025 COMPLETE CBC W/AUTO DIFF WBC: CPT | Performed by: PHYSICIAN ASSISTANT

## 2018-02-19 PROCEDURE — 99285 EMERGENCY DEPT VISIT HI MDM: CPT

## 2018-02-19 PROCEDURE — 77010033678 HC OXYGEN DAILY

## 2018-02-19 PROCEDURE — 84484 ASSAY OF TROPONIN QUANT: CPT | Performed by: PHYSICIAN ASSISTANT

## 2018-02-19 PROCEDURE — 74011250636 HC RX REV CODE- 250/636: Performed by: NURSE PRACTITIONER

## 2018-02-19 PROCEDURE — 74011250637 HC RX REV CODE- 250/637: Performed by: NURSE PRACTITIONER

## 2018-02-19 RX ORDER — IBUPROFEN 200 MG
1 TABLET ORAL DAILY
Status: DISCONTINUED | OUTPATIENT
Start: 2018-02-19 | End: 2018-02-21 | Stop reason: HOSPADM

## 2018-02-19 RX ORDER — IBUPROFEN 200 MG
800 TABLET ORAL
COMMUNITY

## 2018-02-19 RX ORDER — IPRATROPIUM BROMIDE AND ALBUTEROL SULFATE 2.5; .5 MG/3ML; MG/3ML
3 SOLUTION RESPIRATORY (INHALATION)
Status: COMPLETED | OUTPATIENT
Start: 2018-02-19 | End: 2018-02-19

## 2018-02-19 RX ORDER — BUDESONIDE 0.5 MG/2ML
500 INHALANT ORAL
Status: DISCONTINUED | OUTPATIENT
Start: 2018-02-19 | End: 2018-02-21 | Stop reason: HOSPADM

## 2018-02-19 RX ORDER — CODEINE PHOSPHATE AND GUAIFENESIN 10; 100 MG/5ML; MG/5ML
5 SOLUTION ORAL
COMMUNITY

## 2018-02-19 RX ORDER — GUAIFENESIN 600 MG/1
600 TABLET, EXTENDED RELEASE ORAL 2 TIMES DAILY
Status: DISCONTINUED | OUTPATIENT
Start: 2018-02-19 | End: 2018-02-21 | Stop reason: HOSPADM

## 2018-02-19 RX ORDER — ZOLPIDEM TARTRATE 5 MG/1
5 TABLET ORAL
Status: DISCONTINUED | OUTPATIENT
Start: 2018-02-19 | End: 2018-02-21 | Stop reason: SDUPTHER

## 2018-02-19 RX ORDER — PSEUDOEPHEDRINE HCL 30 MG
30 TABLET ORAL
COMMUNITY

## 2018-02-19 RX ORDER — POTASSIUM CHLORIDE 750 MG/1
40 TABLET, FILM COATED, EXTENDED RELEASE ORAL EVERY 4 HOURS
Status: COMPLETED | OUTPATIENT
Start: 2018-02-19 | End: 2018-02-19

## 2018-02-19 RX ORDER — SODIUM CHLORIDE 0.9 % (FLUSH) 0.9 %
5-10 SYRINGE (ML) INJECTION EVERY 8 HOURS
Status: DISCONTINUED | OUTPATIENT
Start: 2018-02-19 | End: 2018-02-21 | Stop reason: HOSPADM

## 2018-02-19 RX ORDER — FLUTICASONE FUROATE AND VILANTEROL 200; 25 UG/1; UG/1
1 POWDER RESPIRATORY (INHALATION) DAILY
COMMUNITY

## 2018-02-19 RX ORDER — SODIUM CHLORIDE 0.9 % (FLUSH) 0.9 %
5-10 SYRINGE (ML) INJECTION AS NEEDED
Status: DISCONTINUED | OUTPATIENT
Start: 2018-02-19 | End: 2018-02-21 | Stop reason: HOSPADM

## 2018-02-19 RX ORDER — ENOXAPARIN SODIUM 100 MG/ML
40 INJECTION SUBCUTANEOUS EVERY 24 HOURS
Status: DISCONTINUED | OUTPATIENT
Start: 2018-02-19 | End: 2018-02-21 | Stop reason: HOSPADM

## 2018-02-19 RX ORDER — ACETAMINOPHEN 325 MG/1
650 TABLET ORAL
Status: DISCONTINUED | OUTPATIENT
Start: 2018-02-19 | End: 2018-02-21 | Stop reason: HOSPADM

## 2018-02-19 RX ORDER — DIPHENHYDRAMINE HCL 25 MG
25 CAPSULE ORAL
Status: DISCONTINUED | OUTPATIENT
Start: 2018-02-19 | End: 2018-02-21 | Stop reason: HOSPADM

## 2018-02-19 RX ORDER — ARFORMOTEROL TARTRATE 15 UG/2ML
15 SOLUTION RESPIRATORY (INHALATION)
Status: DISCONTINUED | OUTPATIENT
Start: 2018-02-19 | End: 2018-02-21 | Stop reason: HOSPADM

## 2018-02-19 RX ORDER — ONDANSETRON 2 MG/ML
4 INJECTION INTRAMUSCULAR; INTRAVENOUS
Status: DISCONTINUED | OUTPATIENT
Start: 2018-02-19 | End: 2018-02-21 | Stop reason: HOSPADM

## 2018-02-19 RX ORDER — LEVOFLOXACIN 5 MG/ML
750 INJECTION, SOLUTION INTRAVENOUS EVERY 24 HOURS
Status: DISCONTINUED | OUTPATIENT
Start: 2018-02-19 | End: 2018-02-21

## 2018-02-19 RX ORDER — LORAZEPAM 1 MG/1
0.5 TABLET ORAL
COMMUNITY

## 2018-02-19 RX ORDER — FLUOXETINE HYDROCHLORIDE 20 MG/1
20 CAPSULE ORAL DAILY
COMMUNITY

## 2018-02-19 RX ORDER — IPRATROPIUM BROMIDE AND ALBUTEROL SULFATE 2.5; .5 MG/3ML; MG/3ML
3 SOLUTION RESPIRATORY (INHALATION)
Status: DISCONTINUED | OUTPATIENT
Start: 2018-02-19 | End: 2018-02-21 | Stop reason: HOSPADM

## 2018-02-19 RX ADMIN — IPRATROPIUM BROMIDE AND ALBUTEROL SULFATE 3 ML: .5; 3 SOLUTION RESPIRATORY (INHALATION) at 19:51

## 2018-02-19 RX ADMIN — METHYLPREDNISOLONE SODIUM SUCCINATE 125 MG: 125 INJECTION, POWDER, FOR SOLUTION INTRAMUSCULAR; INTRAVENOUS at 11:10

## 2018-02-19 RX ADMIN — LEVOFLOXACIN 750 MG: 5 INJECTION, SOLUTION INTRAVENOUS at 14:31

## 2018-02-19 RX ADMIN — ARFORMOTEROL TARTRATE 15 MCG: 15 SOLUTION RESPIRATORY (INHALATION) at 13:00

## 2018-02-19 RX ADMIN — POTASSIUM CHLORIDE 40 MEQ: 750 TABLET, EXTENDED RELEASE ORAL at 14:31

## 2018-02-19 RX ADMIN — BUDESONIDE 500 MCG: 0.5 INHALANT RESPIRATORY (INHALATION) at 13:00

## 2018-02-19 RX ADMIN — ENOXAPARIN SODIUM 40 MG: 40 INJECTION SUBCUTANEOUS at 21:08

## 2018-02-19 RX ADMIN — BUDESONIDE 500 MCG: 0.5 INHALANT RESPIRATORY (INHALATION) at 19:51

## 2018-02-19 RX ADMIN — IPRATROPIUM BROMIDE AND ALBUTEROL SULFATE 3 ML: .5; 3 SOLUTION RESPIRATORY (INHALATION) at 10:03

## 2018-02-19 RX ADMIN — POTASSIUM CHLORIDE 40 MEQ: 750 TABLET, EXTENDED RELEASE ORAL at 11:10

## 2018-02-19 RX ADMIN — Medication 10 ML: at 21:08

## 2018-02-19 RX ADMIN — IPRATROPIUM BROMIDE AND ALBUTEROL SULFATE 3 ML: .5; 3 SOLUTION RESPIRATORY (INHALATION) at 15:36

## 2018-02-19 RX ADMIN — GUAIFENESIN 600 MG: 600 TABLET, EXTENDED RELEASE ORAL at 17:24

## 2018-02-19 RX ADMIN — Medication 10 ML: at 15:00

## 2018-02-19 RX ADMIN — POTASSIUM CHLORIDE 40 MEQ: 750 TABLET, EXTENDED RELEASE ORAL at 18:55

## 2018-02-19 RX ADMIN — METHYLPREDNISOLONE SODIUM SUCCINATE 125 MG: 125 INJECTION, POWDER, FOR SOLUTION INTRAMUSCULAR; INTRAVENOUS at 17:24

## 2018-02-19 NOTE — PROGRESS NOTES
[facility]: MED RECONCILIATION    Comments/Recommendations:   Patient is awake, alert, and knowledgeable about her medications. Patient confirmed her medication allergies. Patient stated that she previously had Vicodin that gave her itchy feet but have taken cough syrup with codeine and does fine. Patient stated she's a bit on edge because she hasn't had her lorazepam since 3 days ago. Medications added:     · Fluoxetine  · Lorazepam  · Sudafed  · Ibuprofen    Medications removed:    · Mucinex    Medications adjusted:    · Robitussin AC  · Breo strength to 200-25 mcg     Allergies: Vicodin [hydrocodone-acetaminophen]    Prior to Admission Medications:     Prior to Admission Medications   Prescriptions Last Dose Informant Patient Reported? Taking? FLUoxetine (PROZAC) 20 mg capsule 2018 at am Self Yes Yes   Sig: Take 20 mg by mouth daily. LORazepam (ATIVAN) 1 mg tablet 2018 at am Self Yes Yes   Sig: Take 0.5 mg by mouth daily as needed for Anxiety. albuterol (PROVENTIL HFA, VENTOLIN HFA, PROAIR HFA) 90 mcg/actuation inhaler 2018 at am Self No Yes   Sig: Take 2 Puffs by inhalation every four (4) hours as needed for Wheezing. albuterol-ipratropium (DUO-NEB) 2.5 mg-0.5 mg/3 ml nebu 2018 at 0630 Self No Yes   Sig: 3 mL by Nebulization route every 4 hours daily while awake resp. fluticasone-vilanterol (BREO ELLIPTA) 200-25 mcg/dose inhaler 2018 at am  Yes Yes   Sig: Take 1 Puff by inhalation daily. guaiFENesin-codeine (ROBITUSSIN AC) 100-10 mg/5 mL solution 2018 at pm Self Yes Yes   Sig: Take 5 mL by mouth every six (6) hours as needed for Cough (Max Daily Amount: 40mL). ibuprofen (MOTRIN) 200 mg tablet 2018 at 0630 Self Yes Yes   Sig: Take 800 mg by mouth every six (6) hours as needed for Pain.    predniSONE (DELTASONE) 20 mg tablet 2018 at 0630 Self No Yes   Si tabs twice a day for 4 days, then 1 tab twice a day for 4 days, then 1 tab daily for 4 days, then 1/ tab daily for 4 days. (30 tabs, 16 days total)   pseudoephedrine (SUDAFED) 30 mg tablet 2/17/2018 at am Self Yes Yes   Sig: Take 30 mg by mouth every four (4) hours as needed for Congestion.       Facility-Administered Medications: None       Thank you,    Charlette Villa   PharmD Candidate [id number]

## 2018-02-19 NOTE — ED NOTES
The patient is resting comfortably with periodic dry cough noted. States her breathing has improved but remains tight.

## 2018-02-19 NOTE — ED TRIAGE NOTES
Pt c/o SOB, pt went to her PCP today and was sent here. Pt with productive cough. Pt has been using steroids, abx and neb treatments with little releif. Pt with COPD, pt does wear oxygen at home but is not wearing it now.

## 2018-02-19 NOTE — H&P
[facility]    Hospitalist Admission Note      NAME:  Cayetano Carrasco   :   1960   MRN:  [id number]     PCP:  Leatha Calix MD     Date/Time:  2018 11:20 AM          Subjective:     CHIEF COMPLAINT: dyspnea     HISTORY OF PRESENT ILLNESS:     Ms. Kathya Mondragon is a 59 y.o.  female who presented to the Emergency Department complaining of dyspnea. Worsening over 6 days. At onset had sudden headache, body aches, fatigue, fever, severe chills, sore throat and cough. Went at day 1 to pulm MD, rapid flu test negative, placed on doxy and steroids. Patient spent next 4 days highly active at daughters wedding. She noted hypoxia at home on her own meter. In our ER she is wheezing and mildly hypoxic. We will admit her for management. Past Medical History:   Diagnosis Date    Anxiety     Chronic obstructive pulmonary disease ([address]     Depression     Diverticulitis     Hyperlipemia         Past Surgical History:   Procedure Laterality Date    HX  SECTION      HX HEART CATHETERIZATION      HX OTHER SURGICAL      knee surgery        Social History   Substance Use Topics    Smoking status: Current Every Day Smoker     Packs/day: 0.50    Smokeless tobacco: Never Used    Alcohol use No        Family History   Problem Relation Age of Onset    Heart Disease Mother     Hypertension Mother     COPD Father         Allergies   Allergen Reactions    Vicodin [Hydrocodone-Acetaminophen] Itching     States \"it makes my feet itch. \"        Prior to Admission medications    Medication Sig Start Date End Date Taking? Authorizing Provider   guaiFENesin-codeine (ROBITUSSIN AC) 100-10 mg/5 mL solution Take 5 mL by mouth every six (6) hours as needed for Cough (Max Daily Amount: 40mL). Yes Historical Provider   FLUoxetine (PROZAC) 20 mg capsule Take 20 mg by mouth daily.    Yes Historical Provider   LORazepam (ATIVAN) 1 mg tablet Take 0.5 mg by mouth daily as needed for Anxiety. Yes Historical Provider   pseudoephedrine (SUDAFED) 30 mg tablet Take 30 mg by mouth every four (4) hours as needed for Congestion. Yes Historical Provider   ibuprofen (MOTRIN) 200 mg tablet Take 800 mg by mouth every six (6) hours as needed for Pain. Yes Historical Provider   fluticasone-vilanterol (BREO ELLIPTA) 100-25 mcg/dose inhaler Take 1 Puff by inhalation daily. 8/23/17  Yes Karolyn Paz MD   albuterol-ipratropium (DUO-NEB) 2.5 mg-0.5 mg/3 ml nebu 3 mL by Nebulization route every 4 hours daily while awake resp. 8/23/17  Yes Karolyn Paz MD   predniSONE (DELTASONE) 20 mg tablet 2 tabs twice a day for 4 days, then 1 tab twice a day for 4 days, then 1 tab daily for 4 days, then 1/2 tab daily for 4 days. (30 tabs, 16 days total) 8/23/17  Yes Karolyn Paz MD   albuterol (PROVENTIL HFA, VENTOLIN HFA, PROAIR HFA) 90 mcg/actuation inhaler Take 2 Puffs by inhalation every four (4) hours as needed for Wheezing.  8/17/17  Yes Greg Barreto MD       Review of Systems:  (bold if positive, if negative)    Gen:   fever, chills, fatigueEyes:  ENT:  Sore throat, rhinorrhea,CVS:  Pulm:  Cough, dyspnea, sputum, , wheezingGI:    :    MS:  Skin:  Psych:   depression, anxiety,Endo:    Hem:  Renal:    Neuro:  weakness      Objective:      VITALS:    Vital signs reviewed; most recent are:    Visit Vitals    /72 (BP Patient Position: At rest)    Pulse (!) 103    Temp 97.9 °F (36.6 °C)    Resp 18    Ht 5' 2\" (1.575 m)    Wt 63.5 kg (140 lb)    SpO2 92%    BMI 25.61 kg/m2     SpO2 Readings from Last 6 Encounters:   02/19/18 92%   08/23/17 93%   08/17/17 100%   12/10/16 99%   11/14/16 99%   09/04/16 97%    O2 Flow Rate (L/min): 2 l/min   No intake or output data in the 24 hours ending 02/19/18 1120     Exam:     Physical Exam:    Gen:  Well-developed, well-nourished, in no acute distress  HEENT:  Pink conjunctivae, PERRL, hearing intact to voice, moist mucous membranes  Neck:  Supple, without masses, thyroid non-tender  Resp:  No accessory muscle use, bilateral breath sounds with wheezes   Card:  No murmurs, tachycardic S1, S2 without thrills, bruits or peripheral edema  Abd:  Soft, non-tender, non-distended, normoactive bowel sounds are present, no mass  Lymph:  No cervical or inguinal adenopathy  Musc:  No cyanosis or clubbing  Skin:  No rashes or ulcers, skin turgor is good  Neuro:  Cranial nerves are grossly intact, mild motor weakness, follows commands appropriately  Psych:  Good insight, oriented to person, place and time, alert     Labs:    Recent Labs      02/19/18   1005   WBC  5.4   HGB  13.0   HCT  39.8   PLT  151     Recent Labs      02/19/18   1005   NA  139   K  3.1*   CL  101   CO2  31   GLU  163*   BUN  14   CREA  0.76   CA  8.5   ALB  3.1*   TBILI  0.5   SGOT  30   ALT  17     No results found for: GLUCPOC  No results for input(s): PH, PCO2, PO2, HCO3, FIO2 in the last 72 hours. No results for input(s): INR in the last 72 hours. No lab exists for component: INREXT  All Micro Results     Procedure Component Value Units Date/Time    RESPIRATORY PANEL,PCR,NASOPHARYNGEAL [[id number]]     Order Status:  Sent Specimen:  NASOPHARYNGEAL SWAB           I have reviewed previous records       Assessment and Plan:      COPD exacerbation - POA, likely trigger was Flu. Pulm consult. IV solumedrol, Breo, Nebs, guaifenesin. Completed doxycycline, and I do not think she has bacterial superinfection. Borderline personality disorder / Anxiety / Depression / Fibromyalgia - Likely contributes to here sensation of dyspnea. Continue fluoxetine and prn ativan    Hyperlipidemia - Not on statin, PCP to follow. Sinus tachycardia - Driven by nebs and dyspnea. Monitor tele. Hypokalemia - Driven by albuterol. Give PO K.       Telemetry reviewed:   normal sinus rhythm    Risk of deterioration: high      Total time spent with patient: [address] discussed with: Patient, Nursing Staff, Consultant/Specialist and >50% of time spent in counseling and coordination of care    Discussed:  Care Plan       ___________________________________________________    Attending Physician: Arianna Xiong MD

## 2018-02-19 NOTE — ED NOTES
TRANSFER - OUT REPORT:    Verbal report given to Piter melgar RN(name) on [facility]  being transferred to room 528(unit) for routine progression of care       Report consisted of patients Situation, Background, Assessment and   Recommendations(SBAR). Information from the following report(s) SBAR, Kardex, ED Summary, Procedure Summary, MAR, Recent Results and Med Rec Status was reviewed with the receiving nurse. Lines:   Peripheral IV 02/19/18 Right Antecubital (Active)   Site Assessment Clean, dry, & intact 2/19/2018 10:05 AM   Phlebitis Assessment 0 2/19/2018 10:05 AM   Infiltration Assessment 0 2/19/2018 10:05 AM   Dressing Status Clean, dry, & intact 2/19/2018 10:05 AM   Dressing Type Tape;Transparent 2/19/2018 10:05 AM   Hub Color/Line Status Pink;Flushed;Patent 2/19/2018 10:05 AM   Action Taken Blood drawn 2/19/2018 10:05 AM        Opportunity for questions and clarification was provided.       Patient transported with:   [employer]

## 2018-02-19 NOTE — ED NOTES
The patient states she remains short of breath with minimal relief following the breathing treatment. The dry cough continues. Beath sounds remain diminished with some expiratory wheezes noted in the right upper lobes.

## 2018-02-19 NOTE — IP AVS SNAPSHOT
[address] 
[phone] Patient: Deyanira Clark MRN: [id number] :1960 About your hospitalization You were admitted on:  2018 You last received care in the:  [facility]  MED SURG 2 You were discharged on:  2018 Why you were hospitalized Your primary diagnosis was:  Copd Exacerbation (Hcc) Your diagnoses also included:  Sinus Tachycardia, Fibromyalgia, Depression, Copd (Chronic Obstructive Pulmonary Disease) (Hcc), Hyperlipidemia, Borderline Personality Disorder, Hyperlipemia, Anxiety, Hypokalemia, Influenza Follow-up Information Follow up With Details Comments Contact Info Sadi Lepe MD Schedule an appointment as soon as possible for a visit in 1 week  [address] 
[phone] Dana Le MD Schedule an appointment as soon as possible for a visit If symptoms worsen [address] [facility] [address] 
[phone] [employer]  Transitions of Care/Home [address] [address] 
[phone] Discharge Orders None A check maida indicates which time of day the medication should be taken. My Medications START taking these medications Instructions Each Dose to Equal  
 Morning Noon Evening Bedtime  
 guaiFENesin-dextromethorphan 100-10 mg/5 mL syrup Commonly known as:  ROBITUSSIN DM Your last dose was: Your next dose is: Take 10 mL by mouth every six (6) hours as needed for up to 10 days. 10 mL CONTINUE taking these medications Instructions Each Dose to Equal  
 Morning Noon Evening Bedtime  
 albuterol 90 mcg/actuation inhaler Commonly known as:  PROVENTIL HFA, VENTOLIN HFA, PROAIR HFA Your last dose was: Your next dose is: Take 2 Puffs by inhalation every four (4) hours as needed for Wheezing. 2 Puff  
    
   
   
   
  
 albuterol-ipratropium 2.5 mg-0.5 mg/3 ml Nebu Commonly known as:  Mallory Gonzalez Your last dose was: Your next dose is:    
   
   
 3 mL by Nebulization route every 4 hours daily while awake resp. 3 mL FLUoxetine 20 mg capsule Commonly known as:  PROzac Your last dose was: Your next dose is: Take 20 mg by mouth daily. 20 mg  
    
   
   
   
  
 fluticasone-vilanterol 200-25 mcg/dose inhaler Commonly known as:  BREO ELLIPTA Your last dose was: Your next dose is: Take 1 Puff by inhalation daily. 1 Puff  
    
   
   
   
  
 guaiFENesin-codeine 100-10 mg/5 mL solution Commonly known as:  ROBITUSSIN AC Your last dose was: Your next dose is: Take 5 mL by mouth every six (6) hours as needed for Cough (Max Daily Amount: 40mL). 5 mL  
    
   
   
   
  
 ibuprofen 200 mg tablet Commonly known as:  MOTRIN Your last dose was: Your next dose is: Take 800 mg by mouth every six (6) hours as needed for Pain. 800 mg LORazepam 1 mg tablet Commonly known as:  ATIVAN Your last dose was: Your next dose is: Take 0.5 mg by mouth daily as needed for Anxiety. 0.5 mg  
    
   
   
   
  
 predniSONE 20 mg tablet Commonly known as:  Phillip Makennaen Your last dose was: Your next dose is:    
   
   
 2 tabs twice a day for 4 days, then 1 tab twice a day for 4 days, then 1 tab daily for 4 days, then 1/2 tab daily for 4 days. (30 tabs, 16 days total) SUDAFED 30 mg tablet Generic drug:  pseudoephedrine Your last dose was: Your next dose is: Take 30 mg by mouth every four (4) hours as needed for Congestion.   
 30 mg  
    
   
   
   
  
  
  
 Where to Get Your Medications Information on where to get these meds will be given to you by the nurse or doctor. ! Ask your nurse or doctor about these medications  
  guaiFENesin-dextromethorphan 100-10 mg/5 mL syrup  
 predniSONE 20 mg tablet Discharge Instructions Patient Discharge Instructions Na Arvizu / [id number] : 1960 Admitted 2018 Discharged: 2018 Primary Diagnoses Problem List as of 2018  Date Reviewed: 2018 Codes Class Noted - Resolved Influenza Hyperlipemia Anxiety Sinus tachycardia Hypokalemia COPD (chronic obstructive pulmonary disease) (HCC) (Chronic) Hyperlipidemia (Chronic) * (Principal)COPD exacerbation ([facility] [address] Borderline personality disorder Fibromyalgia (Chronic) Depression (Chronic)d Take Home Medications · It is important that you take the medication exactly as they are prescribed. · Keep your medication in the bottles provided by the pharmacist and keep a list of the medication names, dosages, and times to be taken in your wallet. · Do not take other medications without consulting your doctor. What to do at [facility] Recommended diet: Cardiac Diet Recommended activity: Activity as tolerated If you experience worse symptoms, please follow up with your PCP or lung doctor. Follow-up with your PCP in a few weeks Information obtained by : 
I understand that if any problems occur once I am at home I am to contact my physician. I understand and acknowledge receipt of the instructions indicated above. Physician's or R.N.'s Signature                                                                  Date/Time Patient or Representative Signature                                                          Date/Time [employer] Announcement We are excited to announce that we are making your provider's discharge notes available to you in [employer]. You will see these notes when they are completed and signed by the physician that discharged you from your recent hospital stay. If you have any questions or concerns about any information you see in [employer], please call the Health Information Department where you were seen or reach out to your Primary Care Provider for more information about your plan of care. Introducing [address]! [facility] introduces [employer] patient portal. Now you can access parts of your medical record, email your doctor's office, and request medication refills online. 1. In your internet browser, go to [email or web address] 2. Click on the First Time User? Click Here link in the Sign In box. You will see the New Member Sign Up page. 3. Enter your [employer] Access Code exactly as it appears below. You will not need to use this code after youve completed the sign-up process. If you do not sign up before the expiration date, you must request a new code. · [employer] Access Code: [email or web address] Expires: 5/22/2018  4:28 PM 
 
4. Enter the last four digits of your Social Security Number (xxxx) and Date of Birth (mm/dd/yyyy) as indicated and click Submit. You will be taken to the next sign-up page. 5. Create a [employer] ID. This will be your [employer] login ID and cannot be changed, so think of one that is secure and easy to remember. 6. Create a [employer] password. You can change your password at any time. 7. Enter your Password Reset Question and Answer. This can be used at a later time if you forget your password. 8. Enter your e-mail address.  You will receive e-mail notification when new information is available in [employer]. 9. Click Sign Up. You can now view and download portions of your medical record. 10. Click the Download Summary menu link to download a portable copy of your medical information. If you have questions, please visit the Frequently Asked Questions section of the [employer] website. Remember, [employer] is NOT to be used for urgent needs. For medical emergencies, dial 911. Now available from your iPhone and Android! Providers Seen During Your Hospitalization Provider Specialty Primary office phone Seda Khoury MD Emergency Medicine [phone] Anabell Flanagan MD Internal Medicine [phone] Immunizations Administered for This Admission Name Date Influenza Vaccine (Quad) PF  Deferred () Your Primary Care Physician (PCP) Primary Care Physician Office Phone Office Fax Brad Rosas [phone] You are allergic to the following Allergen Reactions Vicodin (Hydrocodone-Acetaminophen) Itching States \"it makes my feet itch. \" Recent Documentation Height Weight Breastfeeding? BMI OB Status Smoking Status 1.575 m 63.5 kg No 25.61 kg/m2 Menopause Current Every Day Smoker Emergency Contacts Name Discharge Info Relation Home Work Mobile Binh Handy CAREGIVER [3] Child [2]   [phone] Patient Belongings The following personal items are in your possession at time of discharge: 
  Dental Appliances: None  Visual Aid: Glasses, At bedside      Home Medications: None   Jewelry: None  Clothing: Footwear, Shirt, Pants, Undergarments, Jacket/Coat    Other Valuables: Vicenta Braden Please provide this summary of care documentation to your next provider. Signatures-by signing, you are acknowledging that this After Visit Summary has been reviewed with you and you have received a copy.   
  
 
  
    
    
 Patient Signature: ____________________________________________________________ Date:  ____________________________________________________________  
  
Jilda Staple Provider Signature:  ____________________________________________________________ Date:  ____________________________________________________________

## 2018-02-19 NOTE — CONSULTS
Name: [facility]: [address]   : 1960 Admit Date: 2018   Phone:   Room: 528/01   PCP: Arnol Cohen MD  MRN: [id number]   Date: 2018  Code: Prior        HPI:      Chart and notes reviewed. Data reviewed. I review the patient's current medications in the medical record at each encounter.  I have evaluated and examined the patient. 1:54 PM       History was obtained from patient. I was asked by Kiran Mcnulty MD to see Aracelis Johnston in consultation for a chief complaint of COPD exacerbation. History of Present Illness:   is a very pleasant, 59year old lady that presented to [facility] today with worsening shortness of breath and fatigue. She has a PMH of COPD which is managed by  of our practice. She saw La Ruiz NP of our practice last Wednesday and tested negative for influenza. She was given 5 days of Doxycycline, prednisone, and cough medicine that day. Her daughter got  this weekend and she thinks that she wore herself out. Her typical COPD medications include Breo and albuterol HFA as needed. Reports that she uses her Breo each day but rarely has to use her rescue inhaler. Reports that she is still smoking about 1/2 ppd but is now motivated to quit. She has had a cough that is non-productive. Denies CP, LE pain, or swelling. Images:  Personally reviewed. CXR:  No acute process. WBC 5.4  Hgb 13  K 3.1  Creat . 76  Troponin < .04  Past Medical History:   Diagnosis Date    Anxiety     Chronic obstructive pulmonary disease ([address]     Depression     Diverticulitis     Hyperlipemia        Past Surgical History:   Procedure Laterality Date    HX  SECTION      HX HEART CATHETERIZATION      HX OTHER SURGICAL      knee surgery        Family History   Problem Relation Age of Onset    Heart Disease Mother     Hypertension Mother     COPD Father        Social History   Substance Use Topics    Smoking status: Current Every Day Smoker     Packs/day: 0.50    Smokeless tobacco: Never Used    Alcohol use No       Allergies   Allergen Reactions    Vicodin [Hydrocodone-Acetaminophen] Itching     States \"it makes my feet itch. \"       Current Facility-Administered Medications   Medication Dose Route Frequency    methylPREDNISolone (PF) (SOLU-MEDROL) injection 125 mg  125 mg IntraVENous Q6H    potassium chloride SR (KLOR-CON 10) tablet 40 mEq  40 mEq Oral Q4H    influenza vaccine 2017-18 (3 yrs+)(PF) (FLUZONE QUAD/FLUARIX QUAD) injection 0.5 mL  0.5 mL IntraMUSCular PRIOR TO DISCHARGE    arformoterol (BROVANA) neb solution 15 mcg  15 mcg Nebulization BID RT    And    budesonide (PULMICORT) 500 mcg/2 ml nebulizer suspension  500 mcg Nebulization BID RT         REVIEW OF SYSTEMS   Negative except as stated in the HPI. Physical Exam:   Visit Vitals    BP 96/72 (BP 1 Location: Left arm, BP Patient Position: At rest)    Pulse 83    Temp 99.4 °F (37.4 °C)    Resp 20    Ht 5' 2\" (1.575 m)    Wt 63.5 kg (140 lb)    SpO2 93%    Breastfeeding No    BMI 25.61 kg/m2       General:  Alert, cooperative, no distress, appears stated age. Head:  Normocephalic, without obvious abnormality, atraumatic. Eyes:  Conjunctivae/corneas clear. Nose: Nares normal. Septum midline. Mucosa normal.    Throat: Lips, mucosa, and tongue normal. Teeth and gums normal.   Neck: Supple, symmetrical, trachea midline, no adenopathy. Lungs:   Course bs bilaterally with expiratory wheeze in the bases   Chest wall:  No tenderness or deformity. Heart:  Regular rate and rhythm, S1, S2 normal, no murmur, click, rub or gallop. Abdomen:   Soft, non-tender. Bowel sounds normal.    Extremities: Extremities normal, atraumatic, no cyanosis or edema. Pulses: 2+ and symmetric all extremities.    Skin: Skin color, texture, turgor normal. No rashes or lesions   Lymph nodes: Cervical, supraclavicular nodes normal. Neurologic: Grossly nonfocal       Lab Results   Component Value Date/Time    Sodium 139 02/19/2018 10:05 AM    Potassium 3.1 (L) 02/19/2018 10:05 AM    Chloride 101 02/19/2018 10:05 AM    CO2 31 02/19/2018 10:05 AM    BUN 14 02/19/2018 10:05 AM    Creatinine 0.76 02/19/2018 10:05 AM    Glucose 163 (H) 02/19/2018 10:05 AM    Calcium 8.5 02/19/2018 10:05 AM    Magnesium 2.1 08/23/2017 05:15 AM    Phosphorus 4.4 08/23/2017 05:15 AM       Lab Results   Component Value Date/Time    WBC 5.4 02/19/2018 10:05 AM    HGB 13.0 02/19/2018 10:05 AM    PLATELET 479 30/29/9782 10:05 AM    MCV 86.9 02/19/2018 10:05 AM       Lab Results   Component Value Date/Time    AST (SGOT) 30 02/19/2018 10:05 AM    Alk.  phosphatase 86 02/19/2018 10:05 AM    Protein, total 6.5 02/19/2018 10:05 AM    Albumin 3.1 (L) 02/19/2018 10:05 AM    Globulin 3.4 02/19/2018 10:05 AM       No results found for: IRON, FE, TIBC, IBCT, PSAT, FERR    Lab Results   Component Value Date/Time    TSH 1.07 11/09/2016 04:27 PM        No results found for: PH, PHI, PCO2, PCO2I, PO2, PO2I, HCO3, HCO3I, FIO2, FIO2I    Lab Results   Component Value Date/Time    Troponin-I, Qt. <0.04 02/19/2018 10:05 AM        No results found for: CULT    No results found for: TOXA1, RPR, HBCM, HBSAG, HAAB, HCAB1, HAAT, G6PD, CRYAC, HIVGT, HIVR, HIV1, HIV12, HIVPC, HIVRPI    No results found for: VANCT, CPK    Lab Results   Component Value Date/Time    Color YELLOW/STRAW 11/09/2016 03:38 PM    Appearance CLEAR 11/09/2016 03:38 PM    Specific gravity 1.005 09/04/2016 11:34 AM    pH (UA) 6.5 11/09/2016 03:38 PM    Protein NEGATIVE  11/09/2016 03:38 PM    Glucose NEGATIVE  11/09/2016 03:38 PM    Ketone NEGATIVE  11/09/2016 03:38 PM    Bilirubin NEGATIVE  11/09/2016 03:38 PM    Blood SMALL (A) 11/09/2016 03:38 PM    Urobilinogen 0.2 11/09/2016 03:38 PM    Nitrites NEGATIVE  11/09/2016 03:38 PM    Leukocyte Esterase NEGATIVE  11/09/2016 03:38 PM    WBC 0-4 11/09/2016 03:38 PM    RBC 0-5 11/09/2016 03:38 PM    Bacteria NEGATIVE  11/09/2016 03:38 PM       IMPRESSION  · Acute Respiratory Failure with Hypoxia  · Acute COPD Exacerbation; likely virally induced  · Depression/Anxiety  · Current Smoker    PLAN  · Supplemental O2 to keep sats >90%  · Continue scheduled Pulmicort/Brovana nebs  · Continue Solu-Medrol  · Add Levaquin  · Replete Potassium  · Check respiratory viral panel  · Advised smoking cessation. Will add nicotine patch. · DVT Prophylaxis:  Lovenox      Thank you very much for the consult. We will follow along with you in MsEneida Kathrine's care.       Wilver Murillo NP

## 2018-02-19 NOTE — ACP (ADVANCE CARE PLANNING)
Responded to in-basket request to assist patient in completion of an Advance Medical Directive. Form gone over and completed with patient. Pt appointed her daughter Franky Galan [phone] as her medical power of . Form completed and copy placed in patient's medical chart. Original and copy given to patient for her medical records. Offered supportive listening to patient. Assured pt of pastoral care support and presence. Pastoral care is available to follow and provide support as needed/desired. [phone]. Visit by: Tamie Almazan. Joyce Ruvalcaba D.Min, MA, [facility]    Lead  [employer] Development [employer] Advancement

## 2018-02-19 NOTE — ED PROVIDER NOTES
HPI Comments: 60 y/o female with PMHx of COPD, HLD, diverticulitis, anxiety and depression, presenting with complaint of shortness of breath. The patient states she developed cough, nasal congestion and sore throat last week. She saw her PCP 6 days ago, tested negative for influenza, and was prescribed doxycycline and prednisone, which she has been taking along with her home nebulizer treatments, however she has continued to experience worsening cough with shortness of breath. This morning she checked her oxygen saturation and noticed SpO2 88% while walking to the bathroom. She followed up with her doctor this morning and was instructed to present to the ED. The patient reports she had been on home exertional oxygen but was just recently taken off of it (still has the equipment at home though). She endorses fever of 101 earlier this week but none in the past few days. She also reports chills, chest tightness, and abdominal pain which she attributes to pulled muscles from coughing. She denies chest pain, N/V/D, light-headedness or syncope. The history is provided by the patient. Past Medical History:   Diagnosis Date    Anxiety     Depression     Diverticulitis     Hyperlipemia        Past Surgical History:   Procedure Laterality Date    HX  SECTION      HX HEART CATHETERIZATION  2013    HX OTHER SURGICAL      knee surgery          Family History:   Problem Relation Age of Onset    Heart Disease Mother     Hypertension Mother     COPD Father        Social History     Social History    Marital status: LEGALLY      Spouse name: N/A    Number of children: N/A    Years of education: N/A     Occupational History    Not on file.      Social History Main Topics    Smoking status: Current Every Day Smoker     Packs/day: 0.50    Smokeless tobacco: Never Used    Alcohol use No    Drug use: No    Sexual activity: Not on file     Other Topics Concern    Not on file     Social History Narrative    60year old   female admitted for rage episodes and depressed mood and severe intepersonal sensitivity. Pt. Has been on Ativan 1 mg po rip prn, which is probably contributing to her mood, poor anger mangement and anxiety. Pt now lives in a rented room after her marriaGE BROKE UP. sHE IS UNEMPLOYED. ALLERGIES: Vicodin [hydrocodone-acetaminophen]    Review of Systems   Constitutional: Positive for chills and fever (101 earlier this week). HENT: Positive for congestion and sore throat. Respiratory: Positive for cough, chest tightness and shortness of breath. Cardiovascular: Negative for chest pain. Gastrointestinal: Positive for abdominal pain (from coughing). Negative for diarrhea, nausea and vomiting. Musculoskeletal: Negative for myalgias. Skin: Negative for wound. Neurological: Negative for syncope and light-headedness. All other systems reviewed and are negative. Vitals:    02/19/18 0931   BP: 111/72   Pulse: (!) 103   Resp: 18   Temp: 97.9 °F (36.6 °C)   SpO2: 91%   Weight: 63.5 kg (140 lb)   Height: 5' 2\" (1.575 m)            Physical Exam   Constitutional: She is oriented to person, place, and time. She appears well-developed and well-nourished. No distress. HENT:   Head: Normocephalic and atraumatic. Eyes: Conjunctivae and EOM are normal.   Neck: Normal range of motion. Neck supple. Cardiovascular: Normal rate, regular rhythm and normal heart sounds. Pulmonary/Chest: She has decreased breath sounds. She has no wheezes. She has no rhonchi. She has no rales. Patient with increased work of breathing but no acute respiratory distress, poor air movement but no wheezing appreciated. Abdominal: Soft. She exhibits no distension. There is no tenderness. Musculoskeletal: Normal range of motion. Neurological: She is alert and oriented to person, place, and time. Skin: Skin is warm and dry. She is not diaphoretic.    Nursing note and vitals reviewed. MDM  Number of Diagnoses or Management Options  Chronic obstructive pulmonary disease, unspecified COPD type ([facility] [address]): Hypoxia:   SOB (shortness of breath):      Amount and/or Complexity of Data Reviewed  Clinical lab tests: ordered and reviewed  Tests in the radiology section of CPT®: ordered and reviewed  Discuss the patient with other providers: yes (Dr. Danny Baker, ED attending)  Independent visualization of images, tracings, or specimens: yes (CXR)    Patient Progress  Patient progress: stable        ED Course       Procedures      58 y/o female with PMHx of COPD, HLD, diverticulitis, anxiety and depression, presenting with complaint of shortness of breath. History and exam as above, concerning for pneumonia or COPD exacerbation with worsening oxygen requirement. At the time of my exam, SpO2 88% on room air. Patient started on 2L NC, will give nebs. Will obtain EKG, CXR, CBC, CMP, troponin. ED EKG interpretation:  Rhythm: normal sinus rhythm; and regular . Rate (approx.): 88; Axis: normal; ST/T wave: normal.  Interpreted by Dr. Danny Baker, 10:14 AM      CXR, read by radiology and independently visualized and interpreted by myself, reveals no evidence of pneumonia or other acute cardiopulmonary abnormalities. Troponin negative. Will consult the hospitalist service for admission. Consult Note - 10:40 AM  I have spoken with Dr. Ligia Arenas. Discussed patient's presentation, history, physical assessment, and available diagnostic results. He will write orders and admit the patient to the hospital. All available results have been reviewed with the patient and any available family. Care plan has been outlined and questions have been answered. Patient and any available family understands and agrees to need for admission to hospital for further treatment not available in ED. Patient is ready for admission.

## 2018-02-19 NOTE — PROGRESS NOTES
Spiritual Care Assessment/Progress Notes    Raphael Bullock [id number]  [id number]    1960  59 y.o.  female    Patient Telephone Number: There is no home phone number on file. Caodaism Affiliation: Mormon   Language: English   Extended Emergency Contact Information  Primary Emergency Contact: [address]  Mobile Phone: [phone]  Relation: Child   Patient Active Problem List    Diagnosis Date Noted    Sinus tachycardia 02/19/2018    Hypokalemia 02/19/2018    Hyperlipemia     Anxiety     COPD (chronic obstructive pulmonary disease) ([facility] 08/20/2017    Hyperlipidemia 08/20/2017    COPD exacerbation ([facility] 08/20/2017    Borderline personality disorder 11/10/2016    Fibromyalgia 11/10/2016    Depression 11/09/2016        Date: 2/19/2018       Level of Caodaism/Spiritual Activity:  []         Involved in shelby tradition/spiritual practice    []         Not involved in shelby tradition/spiritual practice  [x]         Spiritually oriented    []         Claims no spiritual orientation    []         seeking spiritual identity  []         Feels alienated from Taoist practice/tradition  []         Feels angry about Taoist practice/tradition  []         Spirituality/Taoist tradition is a resource for coping at this time.   []         Not able to assess due to medical condition    Services Provided Today:  []         crisis intervention    []         reading Scriptures  [x]         spiritual assessment    []         prayer  []         empathic listening/emotional support  []         rites and rituals (cite in comments)  []         life review     []         Taoist support  []         theological development   []         advocacy  []         ethical dialog     []         blessing  []         bereavement support    []         support to family  []         anticipatory grief support   [x]         help with AMD  []         spiritual guidance    [] meditation      Spiritual Care Needs  [x]         Emotional Support  [x]         Spiritual/Scientologist Care  []         Loss/Adjustment  []         Advocacy/Referral                /Ethics  []         No needs expressed at               this time  []         Other: (note in               comments)  [address]  []         Follow up visits with               pt/family  []         Provide materials  []         Schedule sacraments  []         Contact Community               Clergy  [x]         Follow up as needed  []         Other: (note in               comments)     Comments:  Responded to in-basket request to assist patient in completion of an Advance Medical Directive. Form gone over and completed with patient. Pt appointed her daughter Dex Mcmahan [phone] as her medical power of . Form completed and copy placed in patient's medical chart. Original and copy given to patient for her medical records. Offered supportive listening to patient. Assured pt of pastoral care support and presence. Pastoral care is available to follow and provide support as needed/desired. [id number]. Visit by: Donnie James.  Jazlyn Ruvalcaba MA, [facility]    Lead  Profession Development & Advancement

## 2018-02-20 PROBLEM — J11.1 INFLUENZA: Status: ACTIVE | Noted: 2018-02-20

## 2018-02-20 LAB
ANION GAP SERPL CALC-SCNC: 10 MMOL/L (ref 5–15)
BUN SERPL-MCNC: 16 MG/DL (ref 6–20)
BUN/CREAT SERPL: 24 (ref 12–20)
CALCIUM SERPL-MCNC: 8.8 MG/DL (ref 8.5–10.1)
CHLORIDE SERPL-SCNC: 105 MMOL/L (ref 97–108)
CO2 SERPL-SCNC: 25 MMOL/L (ref 21–32)
CREAT SERPL-MCNC: 0.68 MG/DL (ref 0.55–1.02)
ERYTHROCYTE [DISTWIDTH] IN BLOOD BY AUTOMATED COUNT: 13.2 % (ref 11.5–14.5)
GLUCOSE SERPL-MCNC: 214 MG/DL (ref 65–100)
HCT VFR BLD AUTO: 36.8 % (ref 35–47)
HGB BLD-MCNC: 12.3 G/DL (ref 11.5–16)
MAGNESIUM SERPL-MCNC: 2 MG/DL (ref 1.6–2.4)
MCH RBC QN AUTO: 29.1 PG (ref 26–34)
MCHC RBC AUTO-ENTMCNC: 33.4 G/DL (ref 30–36.5)
MCV RBC AUTO: 87.2 FL (ref 80–99)
NRBC # BLD: 0 K/UL (ref 0–0.01)
NRBC BLD-RTO: 0 PER 100 WBC
PLATELET # BLD AUTO: 155 K/UL (ref 150–400)
PMV BLD AUTO: 9.3 FL (ref 8.9–12.9)
POTASSIUM SERPL-SCNC: 4 MMOL/L (ref 3.5–5.1)
RBC # BLD AUTO: 4.22 M/UL (ref 3.8–5.2)
SODIUM SERPL-SCNC: 140 MMOL/L (ref 136–145)
WBC # BLD AUTO: 5.9 K/UL (ref 3.6–11)

## 2018-02-20 PROCEDURE — 85027 COMPLETE CBC AUTOMATED: CPT | Performed by: INTERNAL MEDICINE

## 2018-02-20 PROCEDURE — 74011250636 HC RX REV CODE- 250/636: Performed by: NURSE PRACTITIONER

## 2018-02-20 PROCEDURE — 65660000000 HC RM CCU STEPDOWN

## 2018-02-20 PROCEDURE — 83735 ASSAY OF MAGNESIUM: CPT | Performed by: INTERNAL MEDICINE

## 2018-02-20 PROCEDURE — 77010033678 HC OXYGEN DAILY

## 2018-02-20 PROCEDURE — 80048 BASIC METABOLIC PNL TOTAL CA: CPT | Performed by: INTERNAL MEDICINE

## 2018-02-20 PROCEDURE — 74011250637 HC RX REV CODE- 250/637: Performed by: INTERNAL MEDICINE

## 2018-02-20 PROCEDURE — 74011250636 HC RX REV CODE- 250/636: Performed by: INTERNAL MEDICINE

## 2018-02-20 PROCEDURE — 74011000250 HC RX REV CODE- 250: Performed by: INTERNAL MEDICINE

## 2018-02-20 PROCEDURE — 74011000250 HC RX REV CODE- 250: Performed by: NURSE PRACTITIONER

## 2018-02-20 PROCEDURE — 74011250637 HC RX REV CODE- 250/637: Performed by: NURSE PRACTITIONER

## 2018-02-20 PROCEDURE — 36415 COLL VENOUS BLD VENIPUNCTURE: CPT | Performed by: INTERNAL MEDICINE

## 2018-02-20 PROCEDURE — 94640 AIRWAY INHALATION TREATMENT: CPT

## 2018-02-20 RX ORDER — LORAZEPAM 0.5 MG/1
0.5 TABLET ORAL
Status: DISCONTINUED | OUTPATIENT
Start: 2018-02-20 | End: 2018-02-21 | Stop reason: HOSPADM

## 2018-02-20 RX ORDER — GUAIFENESIN/DEXTROMETHORPHAN 100-10MG/5
10 SYRUP ORAL
Status: DISCONTINUED | OUTPATIENT
Start: 2018-02-20 | End: 2018-02-21 | Stop reason: HOSPADM

## 2018-02-20 RX ORDER — FLUOXETINE HYDROCHLORIDE 20 MG/1
20 CAPSULE ORAL DAILY
Status: DISCONTINUED | OUTPATIENT
Start: 2018-02-20 | End: 2018-02-21 | Stop reason: HOSPADM

## 2018-02-20 RX ORDER — ZOLPIDEM TARTRATE 5 MG/1
5 TABLET ORAL
Status: DISCONTINUED | OUTPATIENT
Start: 2018-02-20 | End: 2018-02-21 | Stop reason: HOSPADM

## 2018-02-20 RX ADMIN — GUAIFENESIN AND DEXTROMETHORPHAN 10 ML: 100; 10 SYRUP ORAL at 22:01

## 2018-02-20 RX ADMIN — GUAIFENESIN AND DEXTROMETHORPHAN 10 ML: 100; 10 SYRUP ORAL at 09:26

## 2018-02-20 RX ADMIN — GUAIFENESIN AND DEXTROMETHORPHAN 10 ML: 100; 10 SYRUP ORAL at 16:35

## 2018-02-20 RX ADMIN — ZOLPIDEM TARTRATE 5 MG: 5 TABLET ORAL at 22:36

## 2018-02-20 RX ADMIN — IPRATROPIUM BROMIDE AND ALBUTEROL SULFATE 3 ML: .5; 3 SOLUTION RESPIRATORY (INHALATION) at 04:44

## 2018-02-20 RX ADMIN — LORAZEPAM 0.5 MG: 0.5 TABLET ORAL at 14:14

## 2018-02-20 RX ADMIN — FLUOXETINE 20 MG: 20 CAPSULE ORAL at 14:06

## 2018-02-20 RX ADMIN — BUDESONIDE 500 MCG: 0.5 INHALANT RESPIRATORY (INHALATION) at 09:10

## 2018-02-20 RX ADMIN — METHYLPREDNISOLONE SODIUM SUCCINATE 125 MG: 125 INJECTION, POWDER, FOR SOLUTION INTRAMUSCULAR; INTRAVENOUS at 05:48

## 2018-02-20 RX ADMIN — IPRATROPIUM BROMIDE AND ALBUTEROL SULFATE 3 ML: .5; 3 SOLUTION RESPIRATORY (INHALATION) at 20:03

## 2018-02-20 RX ADMIN — METHYLPREDNISOLONE SODIUM SUCCINATE 80 MG: 125 INJECTION, POWDER, FOR SOLUTION INTRAMUSCULAR; INTRAVENOUS at 22:02

## 2018-02-20 RX ADMIN — ENOXAPARIN SODIUM 40 MG: 40 INJECTION SUBCUTANEOUS at 22:01

## 2018-02-20 RX ADMIN — Medication 10 ML: at 22:02

## 2018-02-20 RX ADMIN — IPRATROPIUM BROMIDE AND ALBUTEROL SULFATE 3 ML: .5; 3 SOLUTION RESPIRATORY (INHALATION) at 12:09

## 2018-02-20 RX ADMIN — BUDESONIDE 500 MCG: 0.5 INHALANT RESPIRATORY (INHALATION) at 20:03

## 2018-02-20 RX ADMIN — LEVOFLOXACIN 750 MG: 5 INJECTION, SOLUTION INTRAVENOUS at 14:07

## 2018-02-20 RX ADMIN — GUAIFENESIN 600 MG: 600 TABLET, EXTENDED RELEASE ORAL at 17:38

## 2018-02-20 RX ADMIN — IPRATROPIUM BROMIDE AND ALBUTEROL SULFATE 3 ML: .5; 3 SOLUTION RESPIRATORY (INHALATION) at 09:10

## 2018-02-20 RX ADMIN — ARFORMOTEROL TARTRATE 15 MCG: 15 SOLUTION RESPIRATORY (INHALATION) at 09:10

## 2018-02-20 RX ADMIN — Medication 10 ML: at 05:48

## 2018-02-20 RX ADMIN — IPRATROPIUM BROMIDE AND ALBUTEROL SULFATE 3 ML: .5; 3 SOLUTION RESPIRATORY (INHALATION) at 16:42

## 2018-02-20 RX ADMIN — Medication 10 ML: at 14:08

## 2018-02-20 RX ADMIN — GUAIFENESIN 600 MG: 600 TABLET, EXTENDED RELEASE ORAL at 08:48

## 2018-02-20 RX ADMIN — METHYLPREDNISOLONE SODIUM SUCCINATE 125 MG: 125 INJECTION, POWDER, FOR SOLUTION INTRAMUSCULAR; INTRAVENOUS at 00:00

## 2018-02-20 RX ADMIN — IPRATROPIUM BROMIDE AND ALBUTEROL SULFATE 3 ML: .5; 3 SOLUTION RESPIRATORY (INHALATION) at 00:42

## 2018-02-20 RX ADMIN — METHYLPREDNISOLONE SODIUM SUCCINATE 125 MG: 125 INJECTION, POWDER, FOR SOLUTION INTRAMUSCULAR; INTRAVENOUS at 11:34

## 2018-02-20 NOTE — PROGRESS NOTES
Name: [facility]: [address]   : 1960 Admit Date: 2018   Phone:   Room: 528/01   PCP: Jaylen Walker MD  MRN: [id number]   Date: 2018  Code: Full Code        ROS:  Ms. Aleksandar Morillo is not feeling much improved since coming to the hospital yesterday. She has a dry cough and SOB, but denies any significant wheezing. She is fatigued and has not been able to sleep. She is currently sitting up in bed eating lunch. No respiratory distress. Denies fever, chills, N/V/D. Images:  Personally reviewed. CXR : no acute process. Overnight Events:  Afebrile  BP and HR stable  Oxygen saturations 95% on 2L NC  WBC 5.9  Hgb 12.3  K 4.0  Na 140  Mg 2.0  Glucose 214    Past Medical History:   Diagnosis Date    Anxiety     Chronic obstructive pulmonary disease ([address]     Depression     Diverticulitis     Hyperlipemia        Past Surgical History:   Procedure Laterality Date    HX  SECTION      HX HEART CATHETERIZATION      HX OTHER SURGICAL      knee surgery        Family History   Problem Relation Age of Onset    Heart Disease Mother     Hypertension Mother     COPD Father        Social History   Substance Use Topics    Smoking status: Current Every Day Smoker     Packs/day: 0.50    Smokeless tobacco: Never Used    Alcohol use No       Allergies   Allergen Reactions    Vicodin [Hydrocodone-Acetaminophen] Itching     States \"it makes my feet itch. \"       Current Facility-Administered Medications   Medication Dose Route Frequency    guaiFENesin-dextromethorphan (ROBITUSSIN DM) 100-10 mg/5 mL syrup 10 mL  10 mL Oral Q6H PRN    zolpidem (AMBIEN) tablet 5 mg  5 mg Oral QHS PRN    FLUoxetine (PROzac) capsule 20 mg  20 mg Oral DAILY    LORazepam (ATIVAN) tablet 0.5 mg  0.5 mg Oral DAILY PRN    methylPREDNISolone (PF) (SOLU-MEDROL) injection 125 mg  125 mg IntraVENous Q6H    guaiFENesin ER (MUCINEX) tablet 600 mg  600 mg Oral BID    sodium chloride (NS) flush 5-10 mL  5-10 mL IntraVENous Q8H    sodium chloride (NS) flush 5-10 mL  5-10 mL IntraVENous PRN    zolpidem (AMBIEN) tablet 5 mg  5 mg Oral QHS PRN    acetaminophen (TYLENOL) tablet 650 mg  650 mg Oral Q4H PRN    diphenhydrAMINE (BENADRYL) capsule 25 mg  25 mg Oral Q4H PRN    ondansetron (ZOFRAN) injection 4 mg  4 mg IntraVENous Q4H PRN    enoxaparin (LOVENOX) injection 40 mg  40 mg SubCUTAneous Q24H    influenza vaccine 2017-18 (3 yrs+)(PF) (FLUZONE QUAD/FLUARIX QUAD) injection 0.5 mL  0.5 mL IntraMUSCular PRIOR TO DISCHARGE    arformoterol (BROVANA) neb solution 15 mcg  15 mcg Nebulization BID RT    And    budesonide (PULMICORT) 500 mcg/2 ml nebulizer suspension  500 mcg Nebulization BID RT    levoFLOXacin (LEVAQUIN) 750 mg in D5W IVPB  750 mg IntraVENous Q24H    albuterol-ipratropium (DUO-NEB) 2.5 MG-0.5 MG/3 ML  3 mL Nebulization Q4H RT    nicotine (NICODERM CQ) 21 mg/24 hr patch 1 Patch  1 Patch TransDERmal DAILY         REVIEW OF SYSTEMS   Negative except as stated in the HPI. Physical Exam:   Visit Vitals    BP 93/71 (BP 1 Location: Left arm, BP Patient Position: At rest)    Pulse 100    Temp 98.4 °F (36.9 °C)    Resp 18    Ht 5' 2\" (1.575 m)    Wt 63.5 kg (140 lb)    SpO2 93%    Breastfeeding No    BMI 25.61 kg/m2       General:  Alert, cooperative, no distress, appears stated age. Head:  Normocephalic, without obvious abnormality, atraumatic. Eyes:  Conjunctivae/corneas clear. Nose: Nares normal. Septum midline. Mucosa normal.    Throat: Lips, mucosa, and tongue normal. Teeth and gums normal.   Neck: Supple, symmetrical, trachea midline, no adenopathy. Lungs:   Course bs bilaterally with expiratory wheeze in right base. Chest wall:  No tenderness or deformity. Heart:  Regular rate and rhythm, S1, S2 normal, no murmur, click, rub or gallop. Abdomen:   Soft, non-tender.  Bowel sounds normal.    Extremities: Extremities normal, atraumatic, no cyanosis or edema.   Pulses: 2+ and symmetric all extremities. Skin: Skin color, texture, turgor normal. No rashes or lesions   Lymph nodes: Cervical, supraclavicular nodes normal.   Neurologic: Grossly nonfocal       Lab Results   Component Value Date/Time    Sodium 140 02/20/2018 05:53 AM    Potassium 4.0 02/20/2018 05:53 AM    Chloride 105 02/20/2018 05:53 AM    CO2 25 02/20/2018 05:53 AM    BUN 16 02/20/2018 05:53 AM    Creatinine 0.68 02/20/2018 05:53 AM    Glucose 214 (H) 02/20/2018 05:53 AM    Calcium 8.8 02/20/2018 05:53 AM    Magnesium 2.0 02/20/2018 05:53 AM    Phosphorus 4.4 08/23/2017 05:15 AM       Lab Results   Component Value Date/Time    WBC 5.9 02/20/2018 05:53 AM    HGB 12.3 02/20/2018 05:53 AM    PLATELET 199 78/13/6144 05:53 AM    MCV 87.2 02/20/2018 05:53 AM       Lab Results   Component Value Date/Time    AST (SGOT) 30 02/19/2018 10:05 AM    Alk.  phosphatase 86 02/19/2018 10:05 AM    Protein, total 6.5 02/19/2018 10:05 AM    Albumin 3.1 (L) 02/19/2018 10:05 AM    Globulin 3.4 02/19/2018 10:05 AM       No results found for: IRON, FE, TIBC, IBCT, PSAT, FERR    Lab Results   Component Value Date/Time    TSH 1.07 11/09/2016 04:27 PM        No results found for: PH, PHI, PCO2, PCO2I, PO2, PO2I, HCO3, HCO3I, FIO2, FIO2I    Lab Results   Component Value Date/Time    Troponin-I, Qt. <0.04 02/19/2018 10:05 AM        No results found for: CULT    No results found for: TOXA1, RPR, HBCM, HBSAG, HAAB, HCAB1, HAAT, G6PD, CRYAC, HIVGT, HIVR, HIV1, HIV12, HIVPC, HIVRPI    No results found for: VANCT, CPK    Lab Results   Component Value Date/Time    Color YELLOW/STRAW 11/09/2016 03:38 PM    Appearance CLEAR 11/09/2016 03:38 PM    Specific gravity 1.005 09/04/2016 11:34 AM    pH (UA) 6.5 11/09/2016 03:38 PM    Protein NEGATIVE  11/09/2016 03:38 PM    Glucose NEGATIVE  11/09/2016 03:38 PM    Ketone NEGATIVE  11/09/2016 03:38 PM    Bilirubin NEGATIVE  11/09/2016 03:38 PM    Blood SMALL (A) 11/09/2016 03:38 PM Urobilinogen 0.2 11/09/2016 03:38 PM    Nitrites NEGATIVE  11/09/2016 03:38 PM    Leukocyte Esterase NEGATIVE  11/09/2016 03:38 PM    WBC 0-4 11/09/2016 03:38 PM    RBC 0-5 11/09/2016 03:38 PM    Bacteria NEGATIVE  11/09/2016 03:38 PM       IMPRESSION  · Acute Respiratory Failure with Hypoxia  · Acute COPD Exacerbation secondary to positive Influenza A H1N1  · Depression/Anxiety  · Hypokalemia: resolved  · Current Smoker    PLAN  · Supplemental O2 to keep sats >90%  · Continue scheduled Pulmicort/Brovana nebs and DuoNebs  · Continue Mucinex  · Decrease SoluMedrol from 125 mg Q6 to 80 mg Q8  · Levaquin for total of 5 days  · Advised smoking cessation; continue nicotine patch  · Monitor BS; reduced steroids today; no hx of DM  · 6MWT prior to discharge; has O2 at home  · DVT Prophylaxis:  Lovenox      Gregorio Jean Baptiste, NP

## 2018-02-20 NOTE — PROGRESS NOTES
Bedside and Verbal shift change report given to Linnette Joseph RN (oncoming nurse) by Wm Lei RN (offgoing nurse). Report included the following information SBAR, Kardex, MAR, Accordion and Recent Results.

## 2018-02-20 NOTE — PROGRESS NOTES
[facility]  [address] [facility], [place], [address] [place] [address]  [phone]      Medical Progress Note      NAME: Jany Wilder   :  1960  MRM:  [id number]    Date/Time: 2018  10:13 AM       Assessment and Plan:     COPD exacerbation - POA, trigger was Flu. Pulm consulted. IV solumedrol, Breo, Nebs, guaifenesin. Not much better this AM.     Influenza - POA, tested by PCR panel. Outside of any window to treat. Droplet precautions. Completed doxycycline, and I do not think she has bacterial superinfection. I would not continue any antibiotics. Pulm ordered levaquin    Borderline personality disorder / Anxiety / Depression / Fibromyalgia - Likely contributes to here sensation of dyspnea. Continue fluoxetine and prn ativan. Add Ambien for sleep     Hyperlipidemia - Not on statin, PCP to follow.     Sinus tachycardia - Driven by nebs and dyspnea. Monitor tele.     Hypokalemia - Driven by albuterol. Better after giving PO K. Subjective:     Chief Complaint:  Persistent cough, wheeze and didn't sleep well. ROS:  (bold if positive, if negative)    Cough SOB/DAVIS  Tolerating some PT  Tolerating Diet        Objective:     Last 24hrs VS reviewed since prior progress note.  Most recent are:    Visit Vitals    BP 93/71 (BP 1 Location: Left arm, BP Patient Position: At rest)    Pulse 100    Temp 98.4 °F (36.9 °C)    Resp 18    Ht 5' 2\" (1.575 m)    Wt 63.5 kg (140 lb)    SpO2 100%    Breastfeeding No    BMI 25.61 kg/m2     SpO2 Readings from Last 6 Encounters:   18 100%   17 93%   17 100%   12/10/16 99%   16 99%   16 97%    O2 Flow Rate (L/min): 2 l/min   No intake or output data in the 24 hours ending 18 1013     Physical Exam:    Gen:  Frail, in no acute distress  HEENT:  Pink conjunctivae, PERRL, hearing intact to voice, moist mucous membranes  Neck:  Supple, without masses, thyroid non-tender  Resp:  No accessory muscle use, bilateral breath sounds with trace wheezes and rhonchi  Card:  No murmurs, tachycardic S1, S2 without thrills, bruits or peripheral edema  Abd:  Soft, non-tender, non-distended, normoactive bowel sounds are present, no mass  Lymph:  No cervical or inguinal adenopathy  Musc:  No cyanosis or clubbing  Skin:  No rashes or ulcers, skin turgor is good  Neuro:  Cranial nerves are grossly intact, no focal motor weakness, follows commands   Psych:  Good insight, oriented to person, place and time, alert, anxious    Telemetry reviewed:   normal sinus rhythm  __________________________________________________________________  Medications Reviewed: (see below)  Medications:     Current Facility-Administered Medications   Medication Dose Route Frequency    guaiFENesin-dextromethorphan (ROBITUSSIN DM) 100-10 mg/5 mL syrup 10 mL  10 mL Oral Q6H PRN    zolpidem (AMBIEN) tablet 5 mg  5 mg Oral QHS PRN    methylPREDNISolone (PF) (SOLU-MEDROL) injection 125 mg  125 mg IntraVENous Q6H    guaiFENesin ER (MUCINEX) tablet 600 mg  600 mg Oral BID    sodium chloride (NS) flush 5-10 mL  5-10 mL IntraVENous Q8H    sodium chloride (NS) flush 5-10 mL  5-10 mL IntraVENous PRN    zolpidem (AMBIEN) tablet 5 mg  5 mg Oral QHS PRN    acetaminophen (TYLENOL) tablet 650 mg  650 mg Oral Q4H PRN    diphenhydrAMINE (BENADRYL) capsule 25 mg  25 mg Oral Q4H PRN    ondansetron (ZOFRAN) injection 4 mg  4 mg IntraVENous Q4H PRN    enoxaparin (LOVENOX) injection 40 mg  40 mg SubCUTAneous Q24H    influenza vaccine 2017-18 (3 yrs+)(PF) (FLUZONE QUAD/FLUARIX QUAD) injection 0.5 mL  0.5 mL IntraMUSCular PRIOR TO DISCHARGE    arformoterol (BROVANA) neb solution 15 mcg  15 mcg Nebulization BID RT    And    budesonide (PULMICORT) 500 mcg/2 ml nebulizer suspension  500 mcg Nebulization BID RT    levoFLOXacin (LEVAQUIN) 750 mg in D5W IVPB  750 mg IntraVENous Q24H    albuterol-ipratropium (DUO-NEB) 2.5 MG-0.5 MG/3 ML  3 mL Nebulization Q4H RT    nicotine (NICODERM CQ) 21 mg/24 hr patch 1 Patch  1 Patch TransDERmal DAILY        Lab Data Reviewed: (see below)  Lab Review:     Recent Labs      02/20/18   0553  02/19/18   1005   WBC  5.9  5.4   HGB  12.3  13.0   HCT  36.8  39.8   PLT  155  151     Recent Labs      02/20/18   0553  02/19/18   1005   NA  140  139   K  4.0  3.1*   CL  105  101   CO2  25  31   GLU  214*  163*   BUN  16  14   CREA  0.68  0.76   CA  8.8  8.5   MG  2.0   --    ALB   --   3.1*   TBILI   --   0.5   SGOT   --   30   ALT   --   17     No results found for: GLUCPOC  No results for input(s): PH, PCO2, PO2, HCO3, FIO2 in the last 72 hours. No results for input(s): INR in the last 72 hours. No lab exists for component: INREXT  All Micro Results     Procedure Component Value Units Date/Time    RESPIRATORY PANEL,PCR,NASOPHARYNGEAL [[id number]]  (Abnormal) Collected:  02/19/18 1117    Order Status:  Completed Specimen:  Nasopharyngeal Updated:  02/19/18 1616     Adenovirus NOT DETECTED        Coronavirus 229E NOT DETECTED        Coronavirus HKU1 NOT DETECTED        Coronavirus CVNL63 NOT DETECTED        Coronavirus OC43 NOT DETECTED        Metapneumovirus NOT DETECTED        Rhinovirus and Enterovirus NOT DETECTED        Influenza A NOT DETECTED        Influenza A, subtype H1 NOT DETECTED        Influenza A, subtype H3 NOT DETECTED        INFLUENZA A H1N1 PCR DETECTED (A)        Influenza B NOT DETECTED        Parainfluenza 1 NOT DETECTED        Parainfluenza 2 NOT DETECTED        Parainfluenza 3 NOT DETECTED        Parainfluenza virus 4 NOT DETECTED        RSV by PCR NOT DETECTED        Bordetella pertussis - PCR NOT DETECTED        Chlamydophila pneumoniae DNA, QL, PCR NOT DETECTED        Mycoplasma pneumoniae DNA, QL, PCR NOT DETECTED             I have reviewed notes of prior 24hr.     Other pertinent lab: none    Total time spent with patient: [address] [facility] discussed with: Patient, Care Manager, Nursing Staff, Consultant/Specialist and >50% of time spent in counseling and coordination of care    Discussed:  Care Plan    Prophylaxis:  H2B/PPI    Disposition:  Home w/Family           ___________________________________________________    Attending Physician: Stacy Miranda MD

## 2018-02-20 NOTE — PROGRESS NOTES
Tried to perform 6MW with patient, she refuses at this time. Patient states she is just not up to it. I will check back with her this afternoon.

## 2018-02-20 NOTE — CDMP QUERY
=> Acute respiratory failure with hypoxia POA 2/2 COPD exacerbation  and influenza ruled out or ruled in   => Other explanation of clinical findings  => Unable to determine (no explanation for clinical findings)    The medical record reflects the following clinical findings, treatment, and risk factors. Risk Factors:  60 yo F admitted with COPD exacerbation    Clinical Indicators: RR 18 - 20 O2 sats 91%- 92% on 2 L    ED note states \" increased work of breathing but no acute respiratory distress\"    2/19 Pulm consult \" Acute Respiratory Failure with Hypoxia\"   Treatment: O2 @ 2L via NC      Please clarify and document your clinical opinion in the progress notes and discharge summary including the definitive and/or presumptive diagnosis, (suspected or probable), related to the above clinical findings. Please include clinical findings supporting your diagnosis.     Thank you for your time   [facility] RN/BSN, CCDS  Desk:   [phone]   Other:  [phone]      Acute Respiratory Failure indicators include:   - Respirations <12 or >25   - Air hunger   - Use of accessory muscles of respiration   - Inability to speak in full sentences   - Cyanosis   AND  - Pulse ox <90% RA or <95% on O2   - pH <7.35 or >7.45   - pO2 < 60 mm Hg (or 10mm below COPD patient's baseline)   - pCO2 >50mm Hg (or 10mm above COPD patient's baseline)

## 2018-02-20 NOTE — PROGRESS NOTES
2- CASE MANAGEMENT NOTE:  I met with the pt to determine potential discharge needs. The pt is very angry about her  leaving her a year or more ago-stating she was left homeless and penniless and her 2 children by him (19 and 20) are with him. She said after she was thrown out of her home she rented a room in a private home but is now living with her sister, Mary Cruz ([phone]), and brother-in-law in their one story house with 4 entry steps. She is independent with her ADL's, works part-time and drives. She had oxygen from [facility] in the past but, when she no longer required it, she called [facility] to pick it up but they never did so it is still at her sister's house. She said she currently has health insurance thru her estranged  but it does not cover everything and she has several hospital bills. I spoke with the [facility] rep who brought her a Care Card application to complete. She did go to several MD's at [facility] but one MD retired and the other left the practice so she no longer has a PCP. I gave her a list of [facility] MD's and encouraged her to make an appointment with one of them and told her they will also accept the Care Card once approved. She has partial prescription drug coverage and gets her medications from [facility] either at [facility] or on [place]. CM will continue to follow and assist as needed. Care Management Interventions  PCP Verified by CM:  Yes ([facility])  Current Support Network:  (Lives with her sister and brother-in-law in a one story house)  Confirm Follow Up Transport: Family  Plan discussed with Pt/Family/Caregiver: Yes  Discharge Location  Discharge Placement:  (Home)    Anthony Arellano, DARRON, CM

## 2018-02-21 VITALS
BODY MASS INDEX: 25.76 KG/M2 | RESPIRATION RATE: 19 BRPM | DIASTOLIC BLOOD PRESSURE: 71 MMHG | OXYGEN SATURATION: 91 % | WEIGHT: 140 LBS | TEMPERATURE: 98.2 F | HEART RATE: 101 BPM | HEIGHT: 62 IN | SYSTOLIC BLOOD PRESSURE: 113 MMHG

## 2018-02-21 PROCEDURE — 74011250637 HC RX REV CODE- 250/637: Performed by: INTERNAL MEDICINE

## 2018-02-21 PROCEDURE — 74011636637 HC RX REV CODE- 636/637: Performed by: INTERNAL MEDICINE

## 2018-02-21 PROCEDURE — 77010033678 HC OXYGEN DAILY

## 2018-02-21 PROCEDURE — 74011000250 HC RX REV CODE- 250: Performed by: INTERNAL MEDICINE

## 2018-02-21 PROCEDURE — 74011250637 HC RX REV CODE- 250/637: Performed by: NURSE PRACTITIONER

## 2018-02-21 PROCEDURE — 74011000250 HC RX REV CODE- 250: Performed by: NURSE PRACTITIONER

## 2018-02-21 PROCEDURE — 94640 AIRWAY INHALATION TREATMENT: CPT

## 2018-02-21 PROCEDURE — 94761 N-INVAS EAR/PLS OXIMETRY MLT: CPT

## 2018-02-21 RX ORDER — GUAIFENESIN/DEXTROMETHORPHAN 100-10MG/5
10 SYRUP ORAL
Qty: 1 BOTTLE | Refills: 0 | Status: SHIPPED | OUTPATIENT
Start: 2018-02-21 | End: 2018-03-03

## 2018-02-21 RX ORDER — PREDNISONE 20 MG/1
40 TABLET ORAL 3 TIMES DAILY
Status: DISCONTINUED | OUTPATIENT
Start: 2018-02-21 | End: 2018-02-21

## 2018-02-21 RX ORDER — PREDNISONE 20 MG/1
TABLET ORAL
Qty: 30 TAB | Refills: 0 | Status: SHIPPED | OUTPATIENT
Start: 2018-02-21

## 2018-02-21 RX ORDER — PREDNISONE 20 MG/1
40 TABLET ORAL 2 TIMES DAILY WITH MEALS
Status: DISCONTINUED | OUTPATIENT
Start: 2018-02-21 | End: 2018-02-21 | Stop reason: HOSPADM

## 2018-02-21 RX ADMIN — IPRATROPIUM BROMIDE AND ALBUTEROL SULFATE 3 ML: .5; 3 SOLUTION RESPIRATORY (INHALATION) at 07:27

## 2018-02-21 RX ADMIN — IPRATROPIUM BROMIDE AND ALBUTEROL SULFATE 3 ML: .5; 3 SOLUTION RESPIRATORY (INHALATION) at 00:09

## 2018-02-21 RX ADMIN — GUAIFENESIN AND DEXTROMETHORPHAN 10 ML: 100; 10 SYRUP ORAL at 15:38

## 2018-02-21 RX ADMIN — LEVOFLOXACIN 750 MG: 500 TABLET, FILM COATED ORAL at 08:50

## 2018-02-21 RX ADMIN — IPRATROPIUM BROMIDE AND ALBUTEROL SULFATE 3 ML: .5; 3 SOLUTION RESPIRATORY (INHALATION) at 11:14

## 2018-02-21 RX ADMIN — GUAIFENESIN AND DEXTROMETHORPHAN 10 ML: 100; 10 SYRUP ORAL at 06:32

## 2018-02-21 RX ADMIN — IPRATROPIUM BROMIDE AND ALBUTEROL SULFATE 3 ML: .5; 3 SOLUTION RESPIRATORY (INHALATION) at 15:06

## 2018-02-21 RX ADMIN — GUAIFENESIN 600 MG: 600 TABLET, EXTENDED RELEASE ORAL at 08:50

## 2018-02-21 RX ADMIN — PREDNISONE 40 MG: 20 TABLET ORAL at 11:12

## 2018-02-21 RX ADMIN — ARFORMOTEROL TARTRATE 15 MCG: 15 SOLUTION RESPIRATORY (INHALATION) at 07:27

## 2018-02-21 RX ADMIN — FLUOXETINE 20 MG: 20 CAPSULE ORAL at 08:50

## 2018-02-21 RX ADMIN — BUDESONIDE 500 MCG: 0.5 INHALANT RESPIRATORY (INHALATION) at 07:27

## 2018-02-21 NOTE — PROGRESS NOTES
Bedside shift change report given to  EI EI Rn (oncoming nurse) by Rajni Rivas RN (offgoing nurse).  Report included the following information SBAR, Kardex, Intake/Output, MAR, Recent Results and Cardiac Rhythm NSR, St.

## 2018-02-21 NOTE — PROGRESS NOTES
Pt has no PIV. Discharge instructions given and gone over with pt. Opportunity to ask questions given.

## 2018-02-21 NOTE — PROGRESS NOTES
Problem: Falls - Risk of  Goal: *Absence of Falls  Document Felice Fall Risk and appropriate interventions in the flowsheet.    Outcome: Progressing Towards Goal  Fall Risk Interventions:            Medication Interventions: Patient to call before getting OOB, Teach patient to arise slowly

## 2018-02-21 NOTE — PROGRESS NOTES
Johnny Peres [facility] 79  [address] [facility], [place], [address] [place] [address]  [phone]      Medical Progress Note      NAME: Sai Gold   :  1960  MRM:  [id number]    Date/Time: 2018  10:13 AM       Assessment and Plan:     COPD exacerbation - POA, trigger was Flu. Pulm consulted. IV solumedrol, Breo, Nebs, guaifenesin. Convert to PO steroids. Slowly better this AM.     Influenza - POA, tested by PCR panel. Outside of any window to treat. Droplet precautions. Completed doxycycline, and I do not think she has bacterial superinfection. I would not continue any antibiotics. Pulm ordered levaquin    Borderline personality disorder / Anxiety / Depression / Fibromyalgia - Likely contributes to here sensation of dyspnea. Continue fluoxetine and prn ativan. Add Ambien for sleep. She refused 6 minute walk yesterday.     Hyperlipidemia - Not on statin, PCP to follow.     Sinus tachycardia - Driven by nebs and dyspnea. Monitor tele.     Hypokalemia - Driven by albuterol. Better after giving PO K. Subjective:     Chief Complaint:  Persistent cough, wheeze but slept better. ROS:  (bold if positive, if negative)    Cough SOB/DAVIS  Tolerating some PT  Tolerating Diet        Objective:     Last 24hrs VS reviewed since prior progress note.  Most recent are:    Visit Vitals    /67 (BP 1 Location: Left arm, BP Patient Position: At rest)    Pulse 87    Temp 98.1 °F (36.7 °C)    Resp 20    Ht 5' 2\" (1.575 m)    Wt 63.5 kg (140 lb)    SpO2 93%    Breastfeeding No    BMI 25.61 kg/m2     SpO2 Readings from Last 6 Encounters:   18 93%   17 93%   17 100%   12/10/16 99%   16 99%   16 97%    O2 Flow Rate (L/min): 2 l/min   No intake or output data in the 24 hours ending 18 0850     Physical Exam:    Gen:  Frail, in no acute distress  HEENT:  Pink conjunctivae, PERRL, hearing intact to voice, moist mucous membranes  Neck:  Supple, without masses, thyroid non-tender  Resp:  No accessory muscle use, bilateral breath sounds with trace wheezes and rhonchi  Card:  No murmurs, tachycardic S1, S2 without thrills, bruits or peripheral edema  Abd:  Soft, non-tender, non-distended, normoactive bowel sounds are present, no mass  Lymph:  No cervical or inguinal adenopathy  Musc:  No cyanosis or clubbing  Skin:  No rashes or ulcers, skin turgor is good  Neuro:  Cranial nerves are grossly intact, no focal motor weakness, follows commands   Psych:  Good insight, oriented to person, place and time, alert, anxious    Telemetry reviewed:   normal sinus rhythm  __________________________________________________________________  Medications Reviewed: (see below)  Medications:     Current Facility-Administered Medications   Medication Dose Route Frequency    predniSONE (DELTASONE) tablet 40 mg  40 mg Oral TID    levoFLOXacin (LEVAQUIN) tablet 750 mg  750 mg Oral ACB    guaiFENesin-dextromethorphan (ROBITUSSIN DM) 100-10 mg/5 mL syrup 10 mL  10 mL Oral Q6H PRN    zolpidem (AMBIEN) tablet 5 mg  5 mg Oral QHS PRN    FLUoxetine (PROzac) capsule 20 mg  20 mg Oral DAILY    LORazepam (ATIVAN) tablet 0.5 mg  0.5 mg Oral DAILY PRN    guaiFENesin ER (MUCINEX) tablet 600 mg  600 mg Oral BID    sodium chloride (NS) flush 5-10 mL  5-10 mL IntraVENous Q8H    sodium chloride (NS) flush 5-10 mL  5-10 mL IntraVENous PRN    acetaminophen (TYLENOL) tablet 650 mg  650 mg Oral Q4H PRN    diphenhydrAMINE (BENADRYL) capsule 25 mg  25 mg Oral Q4H PRN    ondansetron (ZOFRAN) injection 4 mg  4 mg IntraVENous Q4H PRN    enoxaparin (LOVENOX) injection 40 mg  40 mg SubCUTAneous Q24H    influenza vaccine 2017-18 (3 yrs+)(PF) (FLUZONE QUAD/FLUARIX QUAD) injection 0.5 mL  0.5 mL IntraMUSCular PRIOR TO DISCHARGE    arformoterol (BROVANA) neb solution 15 mcg  15 mcg Nebulization BID RT    And    budesonide (PULMICORT) 500 mcg/2 ml nebulizer suspension  500 mcg Nebulization BID RT    albuterol-ipratropium (DUO-NEB) 2.5 MG-0.5 MG/3 ML  3 mL Nebulization Q4H RT    nicotine (NICODERM CQ) 21 mg/24 hr patch 1 Patch  1 Patch TransDERmal DAILY        Lab Data Reviewed: (see below)  Lab Review:     Recent Labs      02/20/18   0553  02/19/18   1005   WBC  5.9  5.4   HGB  12.3  13.0   HCT  36.8  39.8   PLT  155  151     Recent Labs      02/20/18   0553  02/19/18   1005   NA  140  139   K  4.0  3.1*   CL  105  101   CO2  25  31   GLU  214*  163*   BUN  16  14   CREA  0.68  0.76   CA  8.8  8.5   MG  2.0   --    ALB   --   3.1*   TBILI   --   0.5   SGOT   --   30   ALT   --   17     No results found for: GLUCPOC  No results for input(s): PH, PCO2, PO2, HCO3, FIO2 in the last 72 hours. No results for input(s): INR in the last 72 hours. No lab exists for component: INREXT, INREXT  All Micro Results     Procedure Component Value Units Date/Time    RESPIRATORY PANEL,PCR,NASOPHARYNGEAL [[id number]]  (Abnormal) Collected:  02/19/18 1117    Order Status:  Completed Specimen:  Nasopharyngeal Updated:  02/19/18 1616     Adenovirus NOT DETECTED        Coronavirus 229E NOT DETECTED        Coronavirus HKU1 NOT DETECTED        Coronavirus CVNL63 NOT DETECTED        Coronavirus OC43 NOT DETECTED        Metapneumovirus NOT DETECTED        Rhinovirus and Enterovirus NOT DETECTED        Influenza A NOT DETECTED        Influenza A, subtype H1 NOT DETECTED        Influenza A, subtype H3 NOT DETECTED        INFLUENZA A H1N1 PCR DETECTED (A)        Influenza B NOT DETECTED        Parainfluenza 1 NOT DETECTED        Parainfluenza 2 NOT DETECTED        Parainfluenza 3 NOT DETECTED        Parainfluenza virus 4 NOT DETECTED        RSV by PCR NOT DETECTED        Bordetella pertussis - PCR NOT DETECTED        Chlamydophila pneumoniae DNA, QL, PCR NOT DETECTED        Mycoplasma pneumoniae DNA, QL, PCR NOT DETECTED             I have reviewed notes of prior 24hr.     Other pertinent lab: none    Total time spent with patient: 39 Minutes                  Care Plan discussed with: Patient, Care Manager, Nursing Staff, Consultant/Specialist and >50% of time spent in counseling and coordination of care    Discussed:  Care Plan and D/C Planning    Prophylaxis:  H2B/PPI    Disposition:  Home w/Family           ___________________________________________________    Attending Physician: Mary Ellison MD

## 2018-02-21 NOTE — PROGRESS NOTES
Name: [facility]: [facility]   : 1960 Admit Date: 2018   Phone:   Room: 528/01   PCP: Gina Thomason MD  MRN: [id number]   Date: 2018  Code: Full Code           Overnight Events:  Afebrile  Slightly tachycardic  HR low 100s  O2 sats 95% on 2L NC  No new labs today      ROS:  Frustrated because she feels like she is being forced out of the hospital.  Still doesn't feel all that well, maybe 40% better. Denies fever or chills. Continues to have persistent cough. CXR :  Personally reviewed. No acute process    Past Medical History:   Diagnosis Date    Anxiety     Chronic obstructive pulmonary disease ([address]     Depression     Diverticulitis     Hyperlipemia        Past Surgical History:   Procedure Laterality Date    HX  SECTION      HX HEART CATHETERIZATION      HX OTHER SURGICAL      knee surgery        Family History   Problem Relation Age of Onset    Heart Disease Mother     Hypertension Mother     COPD Father        Social History   Substance Use Topics    Smoking status: Current Every Day Smoker     Packs/day: 0.50    Smokeless tobacco: Never Used    Alcohol use No       Allergies   Allergen Reactions    Vicodin [Hydrocodone-Acetaminophen] Itching     States \"it makes my feet itch. \"       Current Facility-Administered Medications   Medication Dose Route Frequency    levoFLOXacin (LEVAQUIN) tablet 750 mg  750 mg Oral ACB    predniSONE (DELTASONE) tablet 40 mg  40 mg Oral BID WITH MEALS    guaiFENesin-dextromethorphan (ROBITUSSIN DM) 100-10 mg/5 mL syrup 10 mL  10 mL Oral Q6H PRN    zolpidem (AMBIEN) tablet 5 mg  5 mg Oral QHS PRN    FLUoxetine (PROzac) capsule 20 mg  20 mg Oral DAILY    LORazepam (ATIVAN) tablet 0.5 mg  0.5 mg Oral DAILY PRN    guaiFENesin ER (MUCINEX) tablet 600 mg  600 mg Oral BID    sodium chloride (NS) flush 5-10 mL  5-10 mL IntraVENous Q8H    sodium chloride (NS) flush 5-10 mL  5-10 mL IntraVENous PRN    acetaminophen (TYLENOL) tablet 650 mg  650 mg Oral Q4H PRN    diphenhydrAMINE (BENADRYL) capsule 25 mg  25 mg Oral Q4H PRN    ondansetron (ZOFRAN) injection 4 mg  4 mg IntraVENous Q4H PRN    enoxaparin (LOVENOX) injection 40 mg  40 mg SubCUTAneous Q24H    influenza vaccine 2017-18 (3 yrs+)(PF) (FLUZONE QUAD/FLUARIX QUAD) injection 0.5 mL  0.5 mL IntraMUSCular PRIOR TO DISCHARGE    arformoterol (BROVANA) neb solution 15 mcg  15 mcg Nebulization BID RT    And    budesonide (PULMICORT) 500 mcg/2 ml nebulizer suspension  500 mcg Nebulization BID RT    albuterol-ipratropium (DUO-NEB) 2.5 MG-0.5 MG/3 ML  3 mL Nebulization Q4H RT    nicotine (NICODERM CQ) 21 mg/24 hr patch 1 Patch  1 Patch TransDERmal DAILY         REVIEW OF SYSTEMS   Negative except as stated in the HPI. Physical Exam:   Visit Vitals    /67 (BP 1 Location: Left arm, BP Patient Position: At rest)    Pulse 87    Temp 98.1 °F (36.7 °C)    Resp 20    Ht 5' 2\" (1.575 m)    Wt 63.5 kg (140 lb)    SpO2 95%    Breastfeeding No    BMI 25.61 kg/m2       General:  Alert, cooperative, no distress, appears stated age. Head:  Normocephalic, without obvious abnormality, atraumatic. Eyes:  Conjunctivae/corneas clear. Nose: Nares normal. Septum midline. Mucosa normal.    Throat: Lips, mucosa, and tongue normal. Teeth and gums normal.   Neck: Supple, symmetrical, trachea midline, no adenopathy. Lungs:   Good air movement with some wheezing bilaterally. Chest wall:  No tenderness or deformity. Heart:  Regular rate and rhythm, S1, S2 normal, no murmur, click, rub or gallop. Abdomen:   Soft, non-tender. Bowel sounds normal.    Extremities: Extremities normal, atraumatic, no cyanosis or edema. Pulses: 2+ and symmetric all extremities.    Skin: Skin color, texture, turgor normal. No rashes or lesions   Lymph nodes: Cervical, supraclavicular nodes normal.   Neurologic: Grossly nonfocal       Lab Results   Component Value Date/Time    Sodium 140 02/20/2018 05:53 AM    Potassium 4.0 02/20/2018 05:53 AM    Chloride 105 02/20/2018 05:53 AM    CO2 25 02/20/2018 05:53 AM    BUN 16 02/20/2018 05:53 AM    Creatinine 0.68 02/20/2018 05:53 AM    Glucose 214 (H) 02/20/2018 05:53 AM    Calcium 8.8 02/20/2018 05:53 AM    Magnesium 2.0 02/20/2018 05:53 AM    Phosphorus 4.4 08/23/2017 05:15 AM       Lab Results   Component Value Date/Time    WBC 5.9 02/20/2018 05:53 AM    HGB 12.3 02/20/2018 05:53 AM    PLATELET 368 90/16/3458 05:53 AM    MCV 87.2 02/20/2018 05:53 AM       Lab Results   Component Value Date/Time    AST (SGOT) 30 02/19/2018 10:05 AM    Alk.  phosphatase 86 02/19/2018 10:05 AM    Protein, total 6.5 02/19/2018 10:05 AM    Albumin 3.1 (L) 02/19/2018 10:05 AM    Globulin 3.4 02/19/2018 10:05 AM       No results found for: IRON, FE, TIBC, IBCT, PSAT, FERR    Lab Results   Component Value Date/Time    TSH 1.07 11/09/2016 04:27 PM        No results found for: PH, PHI, PCO2, PCO2I, PO2, PO2I, HCO3, HCO3I, FIO2, FIO2I    Lab Results   Component Value Date/Time    Troponin-I, Qt. <0.04 02/19/2018 10:05 AM        No results found for: CULT    No results found for: TOXA1, RPR, HBCM, HBSAG, HAAB, HCAB1, HAAT, G6PD, CRYAC, HIVGT, HIVR, HIV1, HIV12, HIVPC, HIVRPI    No results found for: VANCT, CPK    Lab Results   Component Value Date/Time    Color YELLOW/STRAW 11/09/2016 03:38 PM    Appearance CLEAR 11/09/2016 03:38 PM    Specific gravity 1.005 09/04/2016 11:34 AM    pH (UA) 6.5 11/09/2016 03:38 PM    Protein NEGATIVE  11/09/2016 03:38 PM    Glucose NEGATIVE  11/09/2016 03:38 PM    Ketone NEGATIVE  11/09/2016 03:38 PM    Bilirubin NEGATIVE  11/09/2016 03:38 PM    Blood SMALL (A) 11/09/2016 03:38 PM    Urobilinogen 0.2 11/09/2016 03:38 PM    Nitrites NEGATIVE  11/09/2016 03:38 PM    Leukocyte Esterase NEGATIVE  11/09/2016 03:38 PM    WBC 0-4 11/09/2016 03:38 PM    RBC 0-5 11/09/2016 03:38 PM    Bacteria NEGATIVE  11/09/2016 03:38 PM IMPRESSION  · Acute Respiratory Failure with Hypoxia  · Acute COPD Exacerbation secondary to positive Influenza A H1N1  · Depression/Anxiety  · Hypokalemia: resolved  · Current Smoker    PLAN  · Supplemental O2 to keep sats >90%  · Continue scheduled Pulmicort/Brovana nebs and DuoNebs  · Continue Mucinex  · Switched to prednisone per primary team  · Levaquin for total of 5 days  · Advised smoking cessation; continue nicotine patch  · Robitussin DM as needed for cough  · Monitor BS; reduced steroids today; no hx of DM  · Certainly okay from our standpoint for her to stay here overnight if she does not feel better  · DVT Prophylaxis:  Lovenox      Wm Ramos NP

## 2018-02-21 NOTE — PROGRESS NOTES
Home Care Face to Face Encounter    Patients Name: Jany Wilder    YOB: 1960    Primary Diagnosis: RSV    Date of Face to Face:   Feb 21, 2018                                  Face to Face Encounter findings are related to primary reason for home care:   yes. 1. I certify that the patient needs home oxygen by nasal cannula at 2L/min continuously with portable oxygen and all necessary supplies. 2.  I certify that this patient is under my care and that I had a Face-to-Face Encounter that meets the physician Face-to-Face Encounter requirements.   The following are the clinical findings from the [address] [place] [address] encounter that support the need for skilled services and is a summary of the encounter:   02/21/18 1240 02/21/18 1242 02/21/18 1244   RT Walking Oximetry   Stage Resting (Room Air) During Walk (Room Air) During Walk (Room Air)   SpO2 91 % 90 % (!) 88 %    bpm 123 bpm 124 bpm   Rate of Dyspnea 0 0 1   Symptoms --  --  Shortness of Breath   O2 Device None (Room air) None (Room air) None (Room air)   O2 Flow Rate (L/min) --  --  --            See discharge summary      Americo Cole MD  2/21/2018

## 2018-02-21 NOTE — DISCHARGE SUMMARY
Physician Discharge Summary     Patient ID:  Cayetano Carrasco  [id number]  59 y.o.  1960    Admit date: 2/19/2018    Discharge date and time: 2/21/2018    Admission Diagnoses: COPD exacerbation [facility])    Discharge Diagnoses:    Principal Diagnosis   COPD exacerbation ([facility]                                             Other Diagnoses    Depression (11/9/2016)    Borderline personality disorder (11/10/2016)    Fibromyalgia (11/10/2016)    COPD (chronic obstructive pulmonary disease) ([facility] (8/20/2017)    Hyperlipidemia (8/20/2017)    Hyperlipemia ()    Anxiety ()    Sinus tachycardia (2/19/2018)    Hypokalemia (2/19/2018)    Influenza (2/20/2018)     Hospital Course:   COPD exacerbation - POA, trigger was Flu.  Pulm consulted.  IV solumedrol, Breo, Nebs, guaifenesin. Convert to PO steroids.  Slowly better this AM.      Acute respiratory failure with hypoxia -  POA 2/2 COPD exacerbation  and influenza. We set up home oxygen    Influenza - POA, tested by PCR panel. Outside of any window to treat. Droplet precautions. Completed doxycycline, and I do not think she has bacterial superinfection. I would not continue any antibiotics. Pulm ordered levaquin     Borderline personality disorder / Anxiety / Depression / Fibromyalgia - Likely contributes to here sensation of dyspnea. Continue fluoxetine and prn ativan. Add Ambien for sleep. She refused 6 minute walk yesterday.      Hyperlipidemia - Not on statin, PCP to follow.      Sinus tachycardia - Driven by nebs and dyspnea.  Monitor tele.      Hypokalemia - Driven by albuterol. Clayton Span after giving PO K.       PCP: Leatha Calix MD    Consults: Pulmonary/Intensive care    Significant Diagnostic Studies: See Hospital Course    Discharged home in improved condition.     Discharge Exam:   /67 (BP 1 Location: Left arm, BP Patient Position: At rest)    Pulse 87    Temp 98.1 °F (36.7 °C)    Resp 20    Ht 5' 2\" (1.575 m)    Wt 63.5 kg (140 lb)    SpO2 93%    Breastfeeding No    BMI 25.61 kg/m2      Gen:  Frail, in no acute distress  HEENT:  Pink conjunctivae, PERRL, hearing intact to voice, moist mucous membranes  Neck:  Supple, without masses, thyroid non-tender  Resp:  No accessory muscle use, bilateral breath sounds with trace wheezes and rhonchi  Card:  No murmurs, tachycardic S1, S2 without thrills, bruits or peripheral edema  Abd:  Soft, non-tender, non-distended, normoactive bowel sounds are present, no mass  Lymph:  No cervical or inguinal adenopathy  Musc:  No cyanosis or clubbing  Skin:  No rashes or ulcers, skin turgor is good  Neuro:  Cranial nerves are grossly intact, no focal motor weakness, follows commands   Psych:  Good insight, oriented to person, place and time, alert, anxious    Patient Instructions:   Current Discharge Medication List      START taking these medications    Details   guaiFENesin-dextromethorphan (ROBITUSSIN DM) 100-10 mg/5 mL syrup Take 10 mL by mouth every six (6) hours as needed for up to 10 days. Qty: 1 Bottle, Refills: 0         CONTINUE these medications which have CHANGED    Details   predniSONE (DELTASONE) 20 mg tablet 2 tabs twice a day for 4 days, then 1 tab twice a day for 4 days, then 1 tab daily for 4 days, then 1/2 tab daily for 4 days. (30 tabs, 16 days total)  Qty: 30 Tab, Refills: 0         CONTINUE these medications which have NOT CHANGED    Details   guaiFENesin-codeine (ROBITUSSIN AC) 100-10 mg/5 mL solution Take 5 mL by mouth every six (6) hours as needed for Cough (Max Daily Amount: 40mL). FLUoxetine (PROZAC) 20 mg capsule Take 20 mg by mouth daily. LORazepam (ATIVAN) 1 mg tablet Take 0.5 mg by mouth daily as needed for Anxiety. pseudoephedrine (SUDAFED) 30 mg tablet Take 30 mg by mouth every four (4) hours as needed for Congestion. ibuprofen (MOTRIN) 200 mg tablet Take 800 mg by mouth every six (6) hours as needed for Pain.       fluticasone-vilanterol (BREO ELLIPTA) 200-25 mcg/dose inhaler Take 1 Puff by inhalation daily. albuterol-ipratropium (DUO-NEB) 2.5 mg-0.5 mg/3 ml nebu 3 mL by Nebulization route every 4 hours daily while awake resp. Qty: 120 Nebule, Refills: 0      albuterol (PROVENTIL HFA, VENTOLIN HFA, PROAIR HFA) 90 mcg/actuation inhaler Take 2 Puffs by inhalation every four (4) hours as needed for Wheezing. Qty: 1 Inhaler, Refills: 0         STOP taking these medications       guaiFENesin ER (MUCINEX) 600 mg ER tablet Comments:   Reason for Stopping:             Activity: Activity as tolerated  Diet: Cardiac Diet  Wound Care: None needed    Follow-up with your PCP in a few weeks.   Follow-up tests/labs - none    Signed:  Davie Gomez MD  2/21/2018  8:55 AM

## 2018-02-21 NOTE — PROGRESS NOTES
[address]      2/21/2018      RE: Aracelis Johnston      To Whom it May Concern: This is to certify that Aracelis Johnston may may return to work on Feb 26. She was under my care at [facility].      Please feel free to contact my office if you have any questions or concerns. Thank you for your assistance in this matter.     Sincerely,      Kiran Mcnulty MD

## 2018-02-21 NOTE — PROGRESS NOTES
02/21/18 1240 02/21/18 1242 02/21/18 1244   RT Walking Oximetry   Stage Resting (Room Air) During Walk (Room Air) During Walk (Room Air)   SpO2 91 % 90 % (!) 88 %    bpm 123 bpm 124 bpm   Rate of Dyspnea 0 0 1   Symptoms --  --  Shortness of Breath   O2 Device None (Room air) None (Room air) None (Room air)   O2 Flow Rate (L/min) --  --  --        02/21/18 1246 02/21/18 1247   RT Walking Oximetry   Stage During Walk (on O2) After Walk   SpO2 92 % 93 %    bpm 124 bpm   Rate of Dyspnea 0 0   Symptoms --  --    O2 Device Nasal cannula Nasal cannula   O2 Flow Rate (L/min) 2 l/min 2 l/min

## 2018-02-21 NOTE — PROGRESS NOTES
2/21/2018   2:23 PM  CM spoke w/ [facility], they confirmed Pt. had received home O2 from [facility], [facility] had attempted to  DME after Pt called to say she didn't need it however they could not locate her; they confirmed she has concentrator and portables. CM sent referral and order to [facility] in [facility]. CM will follow. Primitivo Leonard    1:47 PM  EMR reviewed, walking oximetry results noted, as per earlier conversation w/ Pt today, she has home O2 with both portables and concentrator at home; sister can bring portable at d/c.  Primitivo Leonard    9:51 AM  EMR reviewed, d/c order noted pending Pt cleared by pulmonary and walking oximetry today. KILO met w/ PT she verified she currently has home O2 through [employer] and has both a concentrator and full portable tanks; her sister who will transport at d/c can bring a portable O2 tank if necessary. KILO offered Pt H2H/COPD visit, Pt declined stating\"it is not my house, I can't have anyone in\". CM will follow.   Primitivo Leonard

## 2018-02-21 NOTE — DISCHARGE INSTRUCTIONS
Patient Discharge Instructions    Olga Rodriguez / [id number] : 1960    Admitted 2018 Discharged: 2018     Primary Diagnoses  Problem List as of 2018  Date Reviewed: 2018          Codes Class Noted - Resolved   Influenza   Hyperlipemia   Anxiety   Sinus tachycardia   Hypokalemia   COPD (chronic obstructive pulmonary disease) (HCC) (Chronic)   Hyperlipidemia (Chronic)   * (Principal)COPD exacerbation (HCC)   Borderline personality disorder   Fibromyalgia (Chronic)   Depression (Chronic)d          Take Home Medications     · It is important that you take the medication exactly as they are prescribed. · Keep your medication in the bottles provided by the pharmacist and keep a list of the medication names, dosages, and times to be taken in your wallet. · Do not take other medications without consulting your doctor. What to do at Home    Recommended diet: Cardiac Diet    Recommended activity: Activity as tolerated    If you experience worse symptoms, please follow up with your PCP or lung doctor. Follow-up with your PCP in a few weeks        Information obtained by :  I understand that if any problems occur once I am at home I am to contact my physician. I understand and acknowledge receipt of the instructions indicated above.                                                                                                                                            Physician's or R.N.'s Signature                                                                  Date/Time                                                                                                                                              Patient or Representative Signature                                                          Date/Time

## 2018-09-10 ENCOUNTER — HOSPITAL ENCOUNTER (OUTPATIENT)
Dept: GENERAL RADIOLOGY | Age: 58
Discharge: HOME | End: 2018-09-10
Attending: NURSE PRACTITIONER
Payer: COMMERCIAL

## 2018-09-10 DIAGNOSIS — J44.1 OBSTRUCTIVE CHRONIC BRONCHITIS WITH EXACERBATION (HCC): ICD-10-CM

## 2018-09-10 PROCEDURE — 71046 X-RAY EXAM CHEST 2 VIEWS: CPT

## 2018-10-23 ENCOUNTER — HOSPITAL ENCOUNTER (OUTPATIENT)
Dept: GENERAL RADIOLOGY | Age: 58
Discharge: HOME | End: 2018-10-23
Attending: NURSE PRACTITIONER
Payer: COMMERCIAL

## 2018-10-23 DIAGNOSIS — J44.1 OBSTRUCTIVE CHRONIC BRONCHITIS WITH EXACERBATION (HCC): ICD-10-CM

## 2018-10-23 PROCEDURE — 71046 X-RAY EXAM CHEST 2 VIEWS: CPT

## 2019-09-26 ENCOUNTER — HOSPITAL ENCOUNTER (OUTPATIENT)
Dept: CT IMAGING | Age: 59
Discharge: HOME | End: 2019-09-26
Attending: INTERNAL MEDICINE
Payer: COMMERCIAL

## 2019-09-26 DIAGNOSIS — F17.201 TOBACCO ABUSE, IN REMISSION: ICD-10-CM

## 2019-09-26 PROCEDURE — G0297 LDCT FOR LUNG CA SCREEN: HCPCS

## 2020-01-07 ENCOUNTER — HOSPITAL ENCOUNTER (OUTPATIENT)
Dept: GENERAL RADIOLOGY | Age: 60
Discharge: HOME | End: 2020-01-07
Attending: INTERNAL MEDICINE
Payer: COMMERCIAL

## 2020-01-07 DIAGNOSIS — J44.9 COPD (CHRONIC OBSTRUCTIVE PULMONARY DISEASE) (HCC): ICD-10-CM

## 2020-01-07 PROCEDURE — 71046 X-RAY EXAM CHEST 2 VIEWS: CPT

## 2020-10-17 ENCOUNTER — TRANSCRIBE ORDER (OUTPATIENT)
Dept: SCHEDULING | Age: 60
End: 2020-10-17

## 2020-10-17 DIAGNOSIS — F17.208 NICOTINE DEPENDENCE, UNSPECIFIED, WITH OTHER NICOTINE-INDUCED DISORDERS: Primary | ICD-10-CM

## 2020-10-19 ENCOUNTER — TRANSCRIBE ORDER (OUTPATIENT)
Dept: SCHEDULING | Age: 60
End: 2020-10-19

## 2020-10-19 DIAGNOSIS — R06.02 SOB (SHORTNESS OF BREATH): ICD-10-CM

## 2020-10-19 DIAGNOSIS — F17.208 TOBACCO-INDUCED SLEEP DISORDER WITH MODERATE OR SEVERE USE DISORDER: Primary | ICD-10-CM

## 2020-10-19 DIAGNOSIS — G47.8 TOBACCO-INDUCED SLEEP DISORDER WITH MODERATE OR SEVERE USE DISORDER: Primary | ICD-10-CM

## 2020-10-28 ENCOUNTER — HOSPITAL ENCOUNTER (OUTPATIENT)
Dept: CT IMAGING | Age: 60
Discharge: HOME | End: 2020-10-28
Attending: PHYSICIAN ASSISTANT
Payer: COMMERCIAL

## 2020-10-28 ENCOUNTER — HOSPITAL ENCOUNTER (OUTPATIENT)
Dept: NON INVASIVE DIAGNOSTICS | Age: 60
Discharge: HOME | End: 2020-10-28
Attending: PHYSICIAN ASSISTANT
Payer: COMMERCIAL

## 2020-10-28 VITALS
HEIGHT: 62 IN | SYSTOLIC BLOOD PRESSURE: 171 MMHG | BODY MASS INDEX: 25.76 KG/M2 | DIASTOLIC BLOOD PRESSURE: 91 MMHG | WEIGHT: 139.99 LBS

## 2020-10-28 DIAGNOSIS — G47.8 TOBACCO-INDUCED SLEEP DISORDER WITH MODERATE OR SEVERE USE DISORDER: ICD-10-CM

## 2020-10-28 DIAGNOSIS — R06.02 SOB (SHORTNESS OF BREATH): ICD-10-CM

## 2020-10-28 DIAGNOSIS — F17.208 TOBACCO-INDUCED SLEEP DISORDER WITH MODERATE OR SEVERE USE DISORDER: ICD-10-CM

## 2020-10-28 LAB
ECHO AO ASC DIAM: 3.62 CM
ECHO AO ROOT DIAM: 3.29 CM
ECHO AR MAX VEL PISA: 331.54 CENTIMETER/SECOND
ECHO AV AREA PEAK VELOCITY: 2.29 CM2
ECHO AV AREA/BSA PEAK VELOCITY: 1.4 CM2/M2
ECHO AV PEAK GRADIENT: 3.82 MMHG
ECHO AV PEAK VELOCITY: 97.78 CM/S
ECHO AV REGURGITANT PHT: 855.25 MS
ECHO IVC PROX: 1.89 CM
ECHO LA AREA 4C: 7.66 CM2
ECHO LA MAJOR AXIS: 3.05 CM
ECHO LA MINOR AXIS: 1.86 CM
ECHO LA VOL 2C: 15.87 ML (ref 22–52)
ECHO LA VOL 4C: 12.65 ML (ref 22–52)
ECHO LA VOL BP: 15.96 ML (ref 22–52)
ECHO LA VOL/BSA BIPLANE: 9.72 ML/M2 (ref 16–28)
ECHO LA VOLUME INDEX A2C: 9.66 ML/M2 (ref 16–28)
ECHO LA VOLUME INDEX A4C: 7.7 ML/M2 (ref 16–28)
ECHO LV E' LATERAL VELOCITY: 6.29 CENTIMETER/SECOND
ECHO LV E' SEPTAL VELOCITY: 6.5 CENTIMETER/SECOND
ECHO LV INTERNAL DIMENSION DIASTOLIC: 3.9 CM (ref 3.9–5.3)
ECHO LV INTERNAL DIMENSION SYSTOLIC: 3.02 CM
ECHO LV IVSD: 1.08 CM (ref 0.6–0.9)
ECHO LV MASS 2D: 121.3 G (ref 67–162)
ECHO LV MASS INDEX 2D: 73.8 G/M2 (ref 43–95)
ECHO LV POSTERIOR WALL DIASTOLIC: 0.91 CM (ref 0.6–0.9)
ECHO LVOT DIAM: 1.82 CM
ECHO LVOT PEAK GRADIENT: 2.94 MMHG
ECHO LVOT PEAK VELOCITY: 85.72 CM/S
ECHO MV A VELOCITY: 86.38 CENTIMETER/SECOND
ECHO MV AREA PHT: 5.21 CM2
ECHO MV E DECELERATION TIME (DT): 145.59 MS
ECHO MV E VELOCITY: 65.61 CENTIMETER/SECOND
ECHO MV PRESSURE HALF TIME (PHT): 42.22 MS
ECHO RV INTERNAL DIMENSION: 2.38 CM
ECHO RV TAPSE: 2.03 CM (ref 1.5–2)
ECHO TV REGURGITANT MAX VELOCITY: 229.67 CM/S
ECHO TV REGURGITANT PEAK GRADIENT: 21.1 MMHG
LA VOL DISK BP: 14.13 ML (ref 22–52)

## 2020-10-28 PROCEDURE — 93306 TTE W/DOPPLER COMPLETE: CPT | Performed by: STUDENT IN AN ORGANIZED HEALTH CARE EDUCATION/TRAINING PROGRAM

## 2020-10-28 PROCEDURE — G0297 LDCT FOR LUNG CA SCREEN: HCPCS

## 2020-10-28 PROCEDURE — 93306 TTE W/DOPPLER COMPLETE: CPT

## 2020-12-07 ENCOUNTER — TRANSCRIBE ORDER (OUTPATIENT)
Dept: SCHEDULING | Age: 60
End: 2020-12-07

## 2020-12-07 DIAGNOSIS — F17.211 NICOTINE DEPENDENCE, CIGARETTES, IN REMISSION: Primary | ICD-10-CM

## 2020-12-22 ENCOUNTER — APPOINTMENT (OUTPATIENT)
Dept: CT IMAGING | Age: 60
End: 2020-12-22
Attending: EMERGENCY MEDICINE
Payer: COMMERCIAL

## 2020-12-22 ENCOUNTER — HOSPITAL ENCOUNTER (EMERGENCY)
Age: 60
Discharge: HOME | End: 2020-12-23
Attending: EMERGENCY MEDICINE
Payer: COMMERCIAL

## 2020-12-22 DIAGNOSIS — K57.32 SIGMOID DIVERTICULITIS: Primary | ICD-10-CM

## 2020-12-22 LAB
ALBUMIN SERPL-MCNC: 4.2 G/DL (ref 3.5–5)
ALBUMIN/GLOB SERPL: 1.4 {RATIO} (ref 1.1–2.2)
ALP SERPL-CCNC: 92 U/L (ref 45–117)
ALT SERPL-CCNC: 17 U/L (ref 12–78)
ANION GAP SERPL CALC-SCNC: 5 MMOL/L (ref 5–15)
AST SERPL-CCNC: 16 U/L (ref 15–37)
BASOPHILS # BLD: 0.1 K/UL (ref 0–0.1)
BASOPHILS NFR BLD: 1 % (ref 0–1)
BILIRUB SERPL-MCNC: 0.3 MG/DL (ref 0.2–1)
BUN SERPL-MCNC: 19 MG/DL (ref 6–20)
BUN/CREAT SERPL: 18 (ref 12–20)
CALCIUM SERPL-MCNC: 9 MG/DL (ref 8.5–10.1)
CHLORIDE SERPL-SCNC: 109 MMOL/L (ref 97–108)
CO2 SERPL-SCNC: 29 MMOL/L (ref 21–32)
COMMENT, HOLDF: NORMAL
CREAT SERPL-MCNC: 1.04 MG/DL (ref 0.55–1.02)
DIFFERENTIAL METHOD BLD: NORMAL
EOSINOPHIL # BLD: 0.2 K/UL (ref 0–0.4)
EOSINOPHIL NFR BLD: 3 % (ref 0–7)
ERYTHROCYTE [DISTWIDTH] IN BLOOD BY AUTOMATED COUNT: 13.2 % (ref 11.5–14.5)
GLOBULIN SER CALC-MCNC: 3.1 G/DL (ref 2–4)
GLUCOSE SERPL-MCNC: 106 MG/DL (ref 65–100)
HCT VFR BLD AUTO: 43.5 % (ref 35–47)
HGB BLD-MCNC: 14.2 G/DL (ref 11.5–16)
IMM GRANULOCYTES # BLD AUTO: 0 K/UL (ref 0–0.04)
IMM GRANULOCYTES NFR BLD AUTO: 0 % (ref 0–0.5)
LACTATE SERPL-SCNC: 1 MMOL/L (ref 0.4–2)
LIPASE SERPL-CCNC: 93 U/L (ref 73–393)
LYMPHOCYTES # BLD: 2.7 K/UL (ref 0.8–3.5)
LYMPHOCYTES NFR BLD: 39 % (ref 12–49)
MCH RBC QN AUTO: 29 PG (ref 26–34)
MCHC RBC AUTO-ENTMCNC: 32.6 G/DL (ref 30–36.5)
MCV RBC AUTO: 88.8 FL (ref 80–99)
MONOCYTES # BLD: 0.6 K/UL (ref 0–1)
MONOCYTES NFR BLD: 8 % (ref 5–13)
NEUTS SEG # BLD: 3.5 K/UL (ref 1.8–8)
NEUTS SEG NFR BLD: 49 % (ref 32–75)
NRBC # BLD: 0 K/UL (ref 0–0.01)
NRBC BLD-RTO: 0 PER 100 WBC
PLATELET # BLD AUTO: 258 K/UL (ref 150–400)
PMV BLD AUTO: 9.6 FL (ref 8.9–12.9)
POTASSIUM SERPL-SCNC: 3.2 MMOL/L (ref 3.5–5.1)
PROT SERPL-MCNC: 7.3 G/DL (ref 6.4–8.2)
RBC # BLD AUTO: 4.9 M/UL (ref 3.8–5.2)
SAMPLES BEING HELD,HOLD: NORMAL
SODIUM SERPL-SCNC: 143 MMOL/L (ref 136–145)
UR CULT HOLD, URHOLD: NORMAL
WBC # BLD AUTO: 7.1 K/UL (ref 3.6–11)

## 2020-12-22 PROCEDURE — 99285 EMERGENCY DEPT VISIT HI MDM: CPT

## 2020-12-22 PROCEDURE — 80053 COMPREHEN METABOLIC PANEL: CPT

## 2020-12-22 PROCEDURE — 85025 COMPLETE CBC W/AUTO DIFF WBC: CPT

## 2020-12-22 PROCEDURE — 36415 COLL VENOUS BLD VENIPUNCTURE: CPT

## 2020-12-22 PROCEDURE — 74011250636 HC RX REV CODE- 250/636: Performed by: EMERGENCY MEDICINE

## 2020-12-22 PROCEDURE — 74177 CT ABD & PELVIS W/CONTRAST: CPT

## 2020-12-22 PROCEDURE — 74011000636 HC RX REV CODE- 636: Performed by: RADIOLOGY

## 2020-12-22 PROCEDURE — 83690 ASSAY OF LIPASE: CPT

## 2020-12-22 PROCEDURE — 81001 URINALYSIS AUTO W/SCOPE: CPT

## 2020-12-22 PROCEDURE — 96374 THER/PROPH/DIAG INJ IV PUSH: CPT

## 2020-12-22 PROCEDURE — 96375 TX/PRO/DX INJ NEW DRUG ADDON: CPT

## 2020-12-22 PROCEDURE — 96361 HYDRATE IV INFUSION ADD-ON: CPT

## 2020-12-22 PROCEDURE — 83605 ASSAY OF LACTIC ACID: CPT

## 2020-12-22 RX ORDER — ONDANSETRON 2 MG/ML
4 INJECTION INTRAMUSCULAR; INTRAVENOUS
Status: COMPLETED | OUTPATIENT
Start: 2020-12-22 | End: 2020-12-22

## 2020-12-22 RX ORDER — HYDROMORPHONE HYDROCHLORIDE 1 MG/ML
1 INJECTION, SOLUTION INTRAMUSCULAR; INTRAVENOUS; SUBCUTANEOUS ONCE
Status: COMPLETED | OUTPATIENT
Start: 2020-12-22 | End: 2020-12-22

## 2020-12-22 RX ADMIN — SODIUM CHLORIDE 1000 ML: 9 INJECTION, SOLUTION INTRAVENOUS at 21:48

## 2020-12-22 RX ADMIN — IOPAMIDOL 100 ML: 755 INJECTION, SOLUTION INTRAVENOUS at 22:39

## 2020-12-22 RX ADMIN — ONDANSETRON 4 MG: 2 INJECTION INTRAMUSCULAR; INTRAVENOUS at 21:48

## 2020-12-22 RX ADMIN — HYDROMORPHONE HYDROCHLORIDE 1 MG: 1 INJECTION, SOLUTION INTRAMUSCULAR; INTRAVENOUS; SUBCUTANEOUS at 21:48

## 2020-12-23 VITALS
DIASTOLIC BLOOD PRESSURE: 85 MMHG | RESPIRATION RATE: 17 BRPM | SYSTOLIC BLOOD PRESSURE: 104 MMHG | TEMPERATURE: 97.8 F | OXYGEN SATURATION: 96 % | HEART RATE: 74 BPM

## 2020-12-23 LAB
APPEARANCE UR: CLEAR
BACTERIA URNS QL MICRO: NEGATIVE /HPF
BILIRUB UR QL: NEGATIVE
COLOR UR: ABNORMAL
EPITH CASTS URNS QL MICRO: ABNORMAL /LPF
GLUCOSE UR STRIP.AUTO-MCNC: NEGATIVE MG/DL
HGB UR QL STRIP: ABNORMAL
HYALINE CASTS URNS QL MICRO: ABNORMAL /LPF (ref 0–5)
KETONES UR QL STRIP.AUTO: NEGATIVE MG/DL
LEUKOCYTE ESTERASE UR QL STRIP.AUTO: NEGATIVE
NITRITE UR QL STRIP.AUTO: NEGATIVE
PH UR STRIP: 5 [PH] (ref 5–8)
PROT UR STRIP-MCNC: NEGATIVE MG/DL
RBC #/AREA URNS HPF: ABNORMAL /HPF (ref 0–5)
SP GR UR REFRACTOMETRY: 1 (ref 1–1.03)
UROBILINOGEN UR QL STRIP.AUTO: 0.2 EU/DL (ref 0.2–1)
WBC URNS QL MICRO: ABNORMAL /HPF (ref 0–4)

## 2020-12-23 RX ORDER — AMOXICILLIN AND CLAVULANATE POTASSIUM 875; 125 MG/1; MG/1
1 TABLET, FILM COATED ORAL 2 TIMES DAILY
Qty: 20 TAB | Refills: 0 | Status: SHIPPED | OUTPATIENT
Start: 2020-12-22 | End: 2021-01-01

## 2020-12-23 NOTE — ED TRIAGE NOTES
Triage: pt advises that she has had back pain for the past five days. orginally pt believed it was a herniated disk. Pt now has abd pain and advises she has had diverticulitis and this is what it feels like.

## 2020-12-23 NOTE — ED PROVIDER NOTES
Patient is a 56-year-old female with history of anxiety, COPD, diverticulitis, hyperlipidemia, hypertension pain, presenting to emergency department for evaluation of abdominal pain. Patient reports that her symptoms started 5 days ago and were initially located in the left lower back and has since transitioned to be located in the abdomen. She notes that this is how her diverticulitis in the past has presented. Pain is described as constant and she has associated nausea. She was seen by her primary care physician yesterday who started her on Cipro and Flagyl, she has had 2 doses of each of the medications and notes that her pain has worsened. She is followed by Dr. [place] with gastroenterology but notes that he is no longer taking her insurance and she needs to find a new GI doctor. She denies fever, chills, sweats, vomiting, diarrhea, bloody stool, dysuria, hematuria, or any other medical complaints at this time. No history of abdominal surgeries. Please note that this dictation was completed with Neura, the computer voice recognition software.  Quite often unanticipated grammatical, syntax, homophones, and other interpretive errors are inadvertently transcribed by the computer software.  Please disregard these errors.  Please excuse any errors that have escaped final proofreading.              Past Medical History:   Diagnosis Date    Anxiety     Chronic obstructive pulmonary disease ([address]     Depression     Diverticulitis     Hyperlipemia        Past Surgical History:   Procedure Laterality Date    HX  SECTION      HX HEART CATHETERIZATION      HX OTHER SURGICAL      knee surgery          Family History:   Problem Relation Age of Onset    Heart Disease Mother     Hypertension Mother     COPD Father        Social History     Socioeconomic History    Marital status:      Spouse name: Not on file    Number of children: Not on file    Years of education: Not on file    Highest education level: Not on file   Occupational History    Not on file   Social Needs    Financial resource strain: Not on file    Food insecurity     Worry: Not on file     Inability: Not on file    Transportation needs     Medical: Not on file     Non-medical: Not on file   Tobacco Use    Smoking status: Current Every Day Smoker     Packs/day: 0.50    Smokeless tobacco: Never Used   Substance and Sexual Activity    Alcohol use: No    Drug use: No    Sexual activity: Not on file   Lifestyle    Physical activity     Days per week: Not on file     Minutes per session: Not on file    Stress: Not on file   Relationships    Social connections     Talks on phone: Not on file     Gets together: Not on file     Attends Oriental orthodox service: Not on file     Active member of club or organization: Not on file     Attends meetings of clubs or organizations: Not on file     Relationship status: Not on file    Intimate partner violence     Fear of current or ex partner: Not on file     Emotionally abused: Not on file     Physically abused: Not on file     Forced sexual activity: Not on file   Other Topics Concern    Not on file   Social History Narrative    60year old   female admitted for rage episodes and depressed mood and severe intepersonal sensitivity. Pt. Has been on Ativan 1 mg po rip prn, which is probably contributing to her mood, poor anger mangement and anxiety. Pt now lives in a rented room after her marriaGE BROKE UP. sHE IS UNEMPLOYED. ALLERGIES: Vicodin [hydrocodone-acetaminophen]    Review of Systems   Constitutional: Negative for chills and fever. HENT: Negative for ear pain and sore throat. Eyes: Negative for visual disturbance. Respiratory: Negative for cough and shortness of breath. Cardiovascular: Negative for chest pain. Gastrointestinal: Positive for abdominal pain and nausea. Negative for blood in stool, diarrhea and vomiting.    Genitourinary: Negative for dysuria, flank pain, hematuria, vaginal bleeding and vaginal discharge. Musculoskeletal: Negative for back pain. Skin: Negative for color change. Neurological: Negative for dizziness and headaches. Psychiatric/Behavioral: Negative for confusion. Vitals:    12/22/20 2055   BP: (!) 137/95   Pulse: 83   Resp: 16   Temp: 97.8 °F (36.6 °C)   SpO2: 97%            Physical Exam  Vitals signs and nursing note reviewed. Constitutional:       General: She is not in acute distress. Appearance: Normal appearance. She is not ill-appearing. HENT:      Head: Normocephalic and atraumatic. Mouth/Throat:      Pharynx: Oropharynx is clear. Eyes:      Extraocular Movements: Extraocular movements intact. Conjunctiva/sclera: Conjunctivae normal.   Neck:      Musculoskeletal: No neck rigidity. Cardiovascular:      Rate and Rhythm: Normal rate and regular rhythm. Pulmonary:      Effort: Pulmonary effort is normal.      Breath sounds: Normal breath sounds. Abdominal:      Palpations: Abdomen is soft. Tenderness: There is generalized abdominal tenderness and tenderness in the left lower quadrant. There is left CVA tenderness and guarding. There is no right CVA tenderness. Negative signs include Dubon's sign and McBurney's sign. Musculoskeletal: Normal range of motion. Skin:     General: Skin is warm and dry. Neurological:      General: No focal deficit present. Mental Status: She is alert and oriented to person, place, and time. Psychiatric:         Mood and Affect: Mood normal.          MDM  Number of Diagnoses or Management Options  Sigmoid diverticulitis  Diagnosis management comments: Patient is alert, appears uncomfortable, afebrile, vitals stable. History of diverticulitis with left lower quadrant pain x 5 days. Has had 2 doses of oral Cipro and Flagyl which was prescribed by her PCP without any improvement in symptoms. Nausea without vomiting.   Abdomen is soft, nondistended, tenderness throughout the abdomen, with most severe tenderness in left lower quadrant. No leukocytosis, anemia, lipase and lactate normal. CT consistent with uncomplicated mild sigmoid diverticulitis. Pt informed of findings and will switch her to treatment with Augmentin po. Strict return precautions outlined. All questions answered at this time. Discussed patient with ED attending Dr. Koko Mcmanus who agrees with current management plan. Amount and/or Complexity of Data Reviewed  Clinical lab tests: reviewed  Tests in the radiology section of CPT®: reviewed  Tests in the medicine section of CPT®: reviewed  Discuss the patient with other providers: yes      ED Course as of Dec 22 2347   Tue Dec 22, 2020   2201 Lactic acid: 1.0 [EP]   2201 CBC WITH AUTOMATED DIFF:    WBC 7.1   RBC 4.90   HGB 14.2   HCT 43.5   MCV 88.8   MCH 29.0   MCHC 32.6   RDW 13.2   PLATELET 496   MPV 9.6   NRBC 0.0   ABSOLUTE NRBC 0.00   NEUTROPHILS 49   LYMPHOCYTES 39   MONOCYTES 8   EOSINOPHILS 3   BASOPHILS 1   IMMATURE GRANULOCYTES 0   ABS. NEUTROPHILS 3.5   ABS. LYMPHOCYTES 2.7   ABS. MONOCYTES 0.6   ABS. EOSINOPHILS 0.2   ABS. BASOPHILS 0.1   ABS. IMM. GRANS. 0.0   DF AUTOMATED [EP]   2210 Lipase: 93 [EP]   3825 METABOLIC PANEL, COMPREHENSIVE(!):    Sodium 143   Potassium 3.2(!)   Chloride 109(!)   CO2 29   Anion gap 5   Glucose 106(!)   BUN 19   Creatinine 1.04(!)   BUN/Creatinine ratio 18   GFR est AA >60   GFR est non-AA 54(!)   Calcium 9.0   Bilirubin, total 0.3   ALT 17   AST 16   Alk. phosphatase 92   Protein, total 7.3   Albumin 4.2   Globulin 3.1   A-G Ratio 1.4 [EP]   2347 IMPRESSION:  Mild sigmoid colonic diverticulitis. There is no associated abscess or drainable  fluid collection.  Follow-up CT scan of the abdomen and 4-6 weeks to evaluate for  resolution of colonic wall thickening recommended.      CT ABD PELV W CONT [EP]      ED Course User Index  [EP] KATT Sweet     12:01 AM  Pt has been reevaluated. There are no new complaints, changes, or physical findings at this time. Medications have been reviewed w/ pt and/or family. Pt and/or family's questions have been answered. Pt and/or family expressed good understanding of the dx/tx/rx and is in agreement with plan of care. Pt instructed and agreed to f/u w/ PCP and to return to ED upon further deterioration. Pt is ready for discharge. IMPRESSION:  1. Sigmoid diverticulitis        PLAN:  1. Current Discharge Medication List      START taking these medications    Details   amoxicillin-clavulanate (Augmentin) 875-125 mg per tablet Take 1 Tab by mouth two (2) times a day for 10 days. Qty: 20 Tab, Refills: 0           2.    Follow-up Information     Follow up With Specialties Details Why Contact Info    Andres Rizo MD [facility] Schedule an appointment as soon as possible for a visit   [address] [address]  [phone]      Lewis Abdi MD Gastroenterology Schedule an appointment as soon as possible for a visit   [address] [place] [address]  [phone]              Return to ED if worse     Procedures

## 2020-12-31 ENCOUNTER — TRANSCRIBE ORDER (OUTPATIENT)
Dept: SCHEDULING | Age: 60
End: 2020-12-31

## 2020-12-31 DIAGNOSIS — F17.210 CIGARETTE SMOKER: Primary | ICD-10-CM

## 2021-08-03 PROBLEM — E78.5 HYPERLIPIDEMIA: Status: RESOLVED | Noted: 2017-08-20 | Resolved: 2021-08-03

## 2021-08-12 ENCOUNTER — TRANSCRIBE ORDER (OUTPATIENT)
Dept: SCHEDULING | Age: 61
End: 2021-08-12

## 2021-08-12 DIAGNOSIS — Z87.891 PERSONAL HISTORY OF TOBACCO USE, PRESENTING HAZARDS TO HEALTH: Primary | ICD-10-CM

## 2021-11-16 ENCOUNTER — TRANSCRIBE ORDER (OUTPATIENT)
Dept: SCHEDULING | Age: 61
End: 2021-11-16

## 2021-11-16 DIAGNOSIS — Z72.0 TOBACCO ABUSE: Primary | ICD-10-CM

## 2022-03-18 PROBLEM — J11.1 INFLUENZA: Status: ACTIVE | Noted: 2018-02-20

## 2022-03-18 PROBLEM — J44.1 COPD EXACERBATION (HCC): Status: ACTIVE | Noted: 2017-08-20

## 2022-03-18 PROBLEM — E87.6 HYPOKALEMIA: Status: ACTIVE | Noted: 2018-02-19

## 2022-03-19 PROBLEM — J44.9 COPD (CHRONIC OBSTRUCTIVE PULMONARY DISEASE) (HCC): Status: ACTIVE | Noted: 2017-08-20

## 2022-03-19 PROBLEM — R00.0 SINUS TACHYCARDIA: Status: ACTIVE | Noted: 2018-02-19

## 2022-11-04 ENCOUNTER — APPOINTMENT (OUTPATIENT)
Dept: CT IMAGING | Age: 62
End: 2022-11-04
Attending: EMERGENCY MEDICINE
Payer: COMMERCIAL

## 2022-11-04 ENCOUNTER — HOSPITAL ENCOUNTER (EMERGENCY)
Age: 62
Discharge: HOME | End: 2022-11-04
Attending: EMERGENCY MEDICINE | Admitting: EMERGENCY MEDICINE
Payer: COMMERCIAL

## 2022-11-04 VITALS
BODY MASS INDEX: 29.44 KG/M2 | OXYGEN SATURATION: 96 % | DIASTOLIC BLOOD PRESSURE: 95 MMHG | WEIGHT: 160 LBS | HEART RATE: 91 BPM | HEIGHT: 62 IN | SYSTOLIC BLOOD PRESSURE: 141 MMHG | TEMPERATURE: 98 F | RESPIRATION RATE: 16 BRPM

## 2022-11-04 DIAGNOSIS — K57.92 ACUTE DIVERTICULITIS: Primary | ICD-10-CM

## 2022-11-04 LAB
ALBUMIN SERPL-MCNC: 3.5 G/DL (ref 3.5–5)
ALBUMIN/GLOB SERPL: 1 {RATIO} (ref 1.1–2.2)
ALP SERPL-CCNC: 99 U/L (ref 45–117)
ALT SERPL-CCNC: 19 U/L (ref 12–78)
AMYLASE SERPL-CCNC: 19 U/L (ref 25–115)
ANION GAP SERPL CALC-SCNC: 3 MMOL/L (ref 5–15)
APPEARANCE UR: CLEAR
AST SERPL-CCNC: 19 U/L (ref 15–37)
BACTERIA URNS QL MICRO: NEGATIVE /HPF
BASOPHILS # BLD: 0.1 K/UL (ref 0–0.1)
BASOPHILS NFR BLD: 1 % (ref 0–1)
BILIRUB SERPL-MCNC: 0.4 MG/DL (ref 0.2–1)
BILIRUB UR QL: NEGATIVE
BUN SERPL-MCNC: 12 MG/DL (ref 6–20)
BUN/CREAT SERPL: 15 (ref 12–20)
CALCIUM SERPL-MCNC: 9 MG/DL (ref 8.5–10.1)
CHLORIDE SERPL-SCNC: 108 MMOL/L (ref 97–108)
CO2 SERPL-SCNC: 31 MMOL/L (ref 21–32)
COLOR UR: ABNORMAL
COMMENT, HOLDF: NORMAL
CREAT SERPL-MCNC: 0.82 MG/DL (ref 0.55–1.02)
DIFFERENTIAL METHOD BLD: NORMAL
EOSINOPHIL # BLD: 0.1 K/UL (ref 0–0.4)
EOSINOPHIL NFR BLD: 2 % (ref 0–7)
EPITH CASTS URNS QL MICRO: ABNORMAL /LPF
ERYTHROCYTE [DISTWIDTH] IN BLOOD BY AUTOMATED COUNT: 13.7 % (ref 11.5–14.5)
GLOBULIN SER CALC-MCNC: 3.6 G/DL (ref 2–4)
GLUCOSE SERPL-MCNC: 110 MG/DL (ref 65–100)
GLUCOSE UR STRIP.AUTO-MCNC: NEGATIVE MG/DL
HCT VFR BLD AUTO: 40.5 % (ref 35–47)
HGB BLD-MCNC: 12.9 G/DL (ref 11.5–16)
HGB UR QL STRIP: ABNORMAL
IMM GRANULOCYTES # BLD AUTO: 0 K/UL (ref 0–0.04)
IMM GRANULOCYTES NFR BLD AUTO: 0 % (ref 0–0.5)
KETONES UR QL STRIP.AUTO: NEGATIVE MG/DL
LEUKOCYTE ESTERASE UR QL STRIP.AUTO: ABNORMAL
LIPASE SERPL-CCNC: 94 U/L (ref 73–393)
LYMPHOCYTES # BLD: 1.6 K/UL (ref 0.8–3.5)
LYMPHOCYTES NFR BLD: 23 % (ref 12–49)
MCH RBC QN AUTO: 27.6 PG (ref 26–34)
MCHC RBC AUTO-ENTMCNC: 31.9 G/DL (ref 30–36.5)
MCV RBC AUTO: 86.5 FL (ref 80–99)
MONOCYTES # BLD: 0.5 K/UL (ref 0–1)
MONOCYTES NFR BLD: 7 % (ref 5–13)
NEUTS SEG # BLD: 4.5 K/UL (ref 1.8–8)
NEUTS SEG NFR BLD: 67 % (ref 32–75)
NITRITE UR QL STRIP.AUTO: NEGATIVE
NRBC # BLD: 0 K/UL (ref 0–0.01)
NRBC BLD-RTO: 0 PER 100 WBC
PH UR STRIP: 7 [PH] (ref 5–8)
PLATELET # BLD AUTO: 215 K/UL (ref 150–400)
PMV BLD AUTO: 9.4 FL (ref 8.9–12.9)
POTASSIUM SERPL-SCNC: 3.6 MMOL/L (ref 3.5–5.1)
PROT SERPL-MCNC: 7.1 G/DL (ref 6.4–8.2)
PROT UR STRIP-MCNC: NEGATIVE MG/DL
RBC # BLD AUTO: 4.68 M/UL (ref 3.8–5.2)
RBC #/AREA URNS HPF: ABNORMAL /HPF (ref 0–5)
SAMPLES BEING HELD,HOLD: NORMAL
SODIUM SERPL-SCNC: 142 MMOL/L (ref 136–145)
SP GR UR REFRACTOMETRY: 1.02 (ref 1–1.03)
UA: UC IF INDICATED,UAUC: ABNORMAL
UROBILINOGEN UR QL STRIP.AUTO: 0.2 EU/DL (ref 0.2–1)
WBC # BLD AUTO: 6.7 K/UL (ref 3.6–11)
WBC URNS QL MICRO: ABNORMAL /HPF (ref 0–4)

## 2022-11-04 PROCEDURE — 96375 TX/PRO/DX INJ NEW DRUG ADDON: CPT

## 2022-11-04 PROCEDURE — 99285 EMERGENCY DEPT VISIT HI MDM: CPT

## 2022-11-04 PROCEDURE — 74011250636 HC RX REV CODE- 250/636: Performed by: EMERGENCY MEDICINE

## 2022-11-04 PROCEDURE — 74177 CT ABD & PELVIS W/CONTRAST: CPT

## 2022-11-04 PROCEDURE — 96374 THER/PROPH/DIAG INJ IV PUSH: CPT

## 2022-11-04 PROCEDURE — 74011000636 HC RX REV CODE- 636: Performed by: EMERGENCY MEDICINE

## 2022-11-04 PROCEDURE — 81001 URINALYSIS AUTO W/SCOPE: CPT

## 2022-11-04 PROCEDURE — 83690 ASSAY OF LIPASE: CPT

## 2022-11-04 PROCEDURE — 36415 COLL VENOUS BLD VENIPUNCTURE: CPT

## 2022-11-04 PROCEDURE — 82150 ASSAY OF AMYLASE: CPT

## 2022-11-04 PROCEDURE — 80053 COMPREHEN METABOLIC PANEL: CPT

## 2022-11-04 PROCEDURE — 85025 COMPLETE CBC W/AUTO DIFF WBC: CPT

## 2022-11-04 RX ORDER — CIPROFLOXACIN 500 MG/1
500 TABLET ORAL 2 TIMES DAILY
Qty: 20 TABLET | Refills: 0 | Status: SHIPPED | OUTPATIENT
Start: 2022-11-04 | End: 2022-11-14

## 2022-11-04 RX ORDER — HYDROMORPHONE HYDROCHLORIDE 1 MG/ML
0.5 INJECTION, SOLUTION INTRAMUSCULAR; INTRAVENOUS; SUBCUTANEOUS
Status: COMPLETED | OUTPATIENT
Start: 2022-11-04 | End: 2022-11-04

## 2022-11-04 RX ORDER — METRONIDAZOLE 500 MG/1
500 TABLET ORAL 3 TIMES DAILY
Qty: 30 TABLET | Refills: 0 | Status: SHIPPED | OUTPATIENT
Start: 2022-11-04 | End: 2022-11-14

## 2022-11-04 RX ORDER — TRAMADOL HYDROCHLORIDE 50 MG/1
50 TABLET ORAL
Qty: 12 TABLET | Refills: 0 | Status: SHIPPED | OUTPATIENT
Start: 2022-11-04 | End: 2022-11-07

## 2022-11-04 RX ORDER — ONDANSETRON 2 MG/ML
8 INJECTION INTRAMUSCULAR; INTRAVENOUS
Status: COMPLETED | OUTPATIENT
Start: 2022-11-04 | End: 2022-11-04

## 2022-11-04 RX ADMIN — HYDROMORPHONE HYDROCHLORIDE 0.5 MG: 1 INJECTION, SOLUTION INTRAMUSCULAR; INTRAVENOUS; SUBCUTANEOUS at 19:43

## 2022-11-04 RX ADMIN — IOPAMIDOL 100 ML: 755 INJECTION, SOLUTION INTRAVENOUS at 19:57

## 2022-11-04 RX ADMIN — ONDANSETRON 8 MG: 2 INJECTION INTRAMUSCULAR; INTRAVENOUS at 19:43

## 2022-11-04 RX ADMIN — SODIUM CHLORIDE 1000 ML: 9 INJECTION, SOLUTION INTRAVENOUS at 19:03

## 2022-11-04 NOTE — ED TRIAGE NOTES
Pt arrives to ED with complaints of left side abdominal pain that wraps around to left flank with bloating and gas around 2am this morning. Pt reports she thinks this is a diverticulitis flair up. Pt denies diarrhea.

## 2022-11-04 NOTE — ED PROVIDER NOTES
64-year-old female with a past medical history significant for anxiety, COPD, depression, diverticulitis, hyperlipidemia, status post  who presents to the ER with a complaint of generalized abdominal pain off and on for 3 weeks, severity 5 out of 10, worse over the last 2 days, migrated to left lower quadrant area accompanied by nausea and diaphoresis. The patient denies any fever chills, sore throat, cough or congestion, headache, neck and back pain, chest pain shortness of breath, diarrhea, constipation, dysuria, dizziness, extremity weakness or numbness, sick contact, skin rash or recent travel, prior history of same. Past Medical History:   Diagnosis Date    Anxiety     Chronic obstructive pulmonary disease ([facility] [address]     Depression     Diverticulitis     Hyperlipemia        Past Surgical History:   Procedure Laterality Date    HX  SECTION      HX HEART CATHETERIZATION      HX OTHER SURGICAL      knee surgery          Family History:   Problem Relation Age of Onset    Heart Disease Mother     Hypertension Mother     COPD Father        Social History     Socioeconomic History    Marital status:      Spouse name: Not on file    Number of children: Not on file    Years of education: Not on file    Highest education level: Not on file   Occupational History    Not on file   Tobacco Use    Smoking status: Every Day     Packs/day: 0.50     Types: Cigarettes    Smokeless tobacco: Never   Substance and Sexual Activity    Alcohol use: No    Drug use: No    Sexual activity: Not on file   Other Topics Concern    Not on file   Social History Narrative    60year old   female admitted for rage episodes and depressed mood and severe intepersonal sensitivity. Pt. Has been on Ativan 1 mg po rip prn, which is probably contributing to her mood, poor anger mangement and anxiety. Pt now lives in a rented room after her marriaGE BROKE UP. sHE IS UNEMPLOYED.      Social Determinants of Health     Financial Resource Strain: Not on file   Food Insecurity: Not on file   Transportation Needs: Not on file   Physical Activity: Not on file   Stress: Not on file   Social Connections: Not on file   Intimate Partner Violence: Not on file   Housing Stability: Not on file         ALLERGIES: Vicodin [hydrocodone-acetaminophen]    Review of Systems   All other systems reviewed and are negative. Vitals:    11/04/22 1841 11/04/22 1856 11/04/22 1910 11/04/22 1930   BP: 138/81 129/86  133/85   Pulse: (!) 111  98 99   Resp: 16   18   Temp: 98 °F (36.7 °C)      SpO2: 96%      Weight: 72.6 kg (160 lb)      Height: 5' 2\" (1.575 m)               Physical Exam  Vitals and nursing note reviewed. Exam conducted with a chaperone present. CONSTITUTIONAL: Well-appearing; well-nourished; in mild distress  HEAD: Normocephalic; atraumatic  EYES: PERRL; EOM intact; conjunctiva and sclera are clear bilaterally. ENT: No rhinorrhea; normal pharynx with no tonsillar hypertrophy; mucous membranes pink/moist, no erythema, no exudate. NECK: Supple; non-tender; no cervical lymphadenopathy  CARD: Normal S1, S2; no murmurs, rubs, or gallops. Regular rate and rhythm. RESP: Normal respiratory effort; breath sounds clear and equal bilaterally; no wheezes, rhonchi, or rales. ABD: Normal bowel sounds; non-distended; left lower quadrant tenderness without any rebound or guarding; no palpable organomegaly, no masses, no bruits. Back Exam: Normal inspection; no vertebral point tenderness, no CVA tenderness. Normal range of motion. EXT: Normal ROM in all four extremities; non-tender to palpation; no swelling or deformity; distal pulses are normal, no edema. SKIN: Warm; dry; no rash.   NEURO:Alert and oriented x 3, coherent, LA NENA-XII grossly intact, sensory and motor are non-focal.        MDM  Number of Diagnoses or Management Options  Acute diverticulitis  Diagnosis management comments: Assessment: 20-year-old female who presents to the ER for evaluation for abdominal pain, left lower quadrant tenderness on palpation and history of diverticulitis without acute exacerbation of diverticular disease, obstructive uropathy, UTI, pyelonephritis, myofascial strain. Plan: Lab/IV fluid/antiemetic and analgesia/CT scan of the abdomen and pelvis/serial exam/ Monitor and Reevaluate. Amount and/or Complexity of Data Reviewed  Clinical lab tests: ordered and reviewed  Tests in the radiology section of CPT®: ordered and reviewed  Tests in the medicine section of CPT®: reviewed and ordered  Discussion of test results with the performing providers: yes  Decide to obtain previous medical records or to obtain history from someone other than the patient: yes  Obtain history from someone other than the patient: yes  Review and summarize past medical records: yes  Discuss the patient with other providers: yes  Independent visualization of images, tracings, or specimens: yes    Risk of Complications, Morbidity, and/or Mortality  Presenting problems: moderate  Diagnostic procedures: moderate  Management options: moderate    Patient Progress  Patient progress: stable         Procedures    Progress Note:   Pt has been reexamined by Carren Siemens, MD. Pt is feeling much better. Symptoms have improved. All available results have been reviewed with pt and any available family. Pt understands sx, dx, and tx in ED. Care plan has been outlined and questions have been answered. Pt is ready to go home. Will send home on diverticulitis instruction. Prescription of Cipro, Flagyl and naproxen. . Outpatient referral with PCP as needed. Written by Carren Siemens, MD,5:20 AM    .   .

## 2022-12-11 ENCOUNTER — APPOINTMENT (OUTPATIENT)
Dept: GENERAL RADIOLOGY | Age: 62
End: 2022-12-11
Attending: STUDENT IN AN ORGANIZED HEALTH CARE EDUCATION/TRAINING PROGRAM
Payer: COMMERCIAL

## 2022-12-11 ENCOUNTER — HOSPITAL ENCOUNTER (EMERGENCY)
Age: 62
Discharge: HOME | End: 2022-12-11
Attending: STUDENT IN AN ORGANIZED HEALTH CARE EDUCATION/TRAINING PROGRAM
Payer: COMMERCIAL

## 2022-12-11 ENCOUNTER — APPOINTMENT (OUTPATIENT)
Dept: CT IMAGING | Age: 62
End: 2022-12-11
Attending: STUDENT IN AN ORGANIZED HEALTH CARE EDUCATION/TRAINING PROGRAM
Payer: COMMERCIAL

## 2022-12-11 VITALS
SYSTOLIC BLOOD PRESSURE: 127 MMHG | BODY MASS INDEX: 29.44 KG/M2 | RESPIRATION RATE: 24 BRPM | DIASTOLIC BLOOD PRESSURE: 68 MMHG | WEIGHT: 160 LBS | TEMPERATURE: 98.1 F | HEART RATE: 115 BPM | HEIGHT: 62 IN | OXYGEN SATURATION: 92 %

## 2022-12-11 DIAGNOSIS — J44.1 COPD EXACERBATION (HCC): Primary | ICD-10-CM

## 2022-12-11 DIAGNOSIS — E87.6 HYPOKALEMIA: ICD-10-CM

## 2022-12-11 LAB
ALBUMIN SERPL-MCNC: 3.4 G/DL (ref 3.5–5)
ALBUMIN/GLOB SERPL: 0.9 {RATIO} (ref 1.1–2.2)
ALP SERPL-CCNC: 96 U/L (ref 45–117)
ALT SERPL-CCNC: 18 U/L (ref 12–78)
ANION GAP SERPL CALC-SCNC: 6 MMOL/L (ref 5–15)
AST SERPL-CCNC: 12 U/L (ref 15–37)
BASE EXCESS BLDV CALC-SCNC: 3.3 MMOL/L
BASOPHILS # BLD: 0.1 K/UL (ref 0–0.1)
BASOPHILS NFR BLD: 1 % (ref 0–1)
BILIRUB SERPL-MCNC: 0.3 MG/DL (ref 0.2–1)
BUN SERPL-MCNC: 15 MG/DL (ref 6–20)
BUN/CREAT SERPL: 15 (ref 12–20)
CALCIUM SERPL-MCNC: 9.4 MG/DL (ref 8.5–10.1)
CHLORIDE SERPL-SCNC: 106 MMOL/L (ref 97–108)
CO2 SERPL-SCNC: 28 MMOL/L (ref 21–32)
COMMENT, HOLDF: NORMAL
COVID-19 RAPID TEST, COVR: NOT DETECTED
CREAT SERPL-MCNC: 0.97 MG/DL (ref 0.55–1.02)
DIFFERENTIAL METHOD BLD: ABNORMAL
EOSINOPHIL # BLD: 0.1 K/UL (ref 0–0.4)
EOSINOPHIL NFR BLD: 1 % (ref 0–7)
ERYTHROCYTE [DISTWIDTH] IN BLOOD BY AUTOMATED COUNT: 14 % (ref 11.5–14.5)
FLUAV AG NPH QL IA: NEGATIVE
FLUBV AG NOSE QL IA: NEGATIVE
GLOBULIN SER CALC-MCNC: 3.8 G/DL (ref 2–4)
GLUCOSE SERPL-MCNC: 119 MG/DL (ref 65–100)
HCO3 BLDV-SCNC: 29.4 MMOL/L (ref 23–28)
HCT VFR BLD AUTO: 40.8 % (ref 35–47)
HGB BLD-MCNC: 13.4 G/DL (ref 11.5–16)
IMM GRANULOCYTES # BLD AUTO: 0 K/UL (ref 0–0.04)
IMM GRANULOCYTES NFR BLD AUTO: 1 % (ref 0–0.5)
LYMPHOCYTES # BLD: 1.4 K/UL (ref 0.8–3.5)
LYMPHOCYTES NFR BLD: 16 % (ref 12–49)
MCH RBC QN AUTO: 28.2 PG (ref 26–34)
MCHC RBC AUTO-ENTMCNC: 32.8 G/DL (ref 30–36.5)
MCV RBC AUTO: 85.7 FL (ref 80–99)
MONOCYTES # BLD: 0.7 K/UL (ref 0–1)
MONOCYTES NFR BLD: 8 % (ref 5–13)
NEUTS SEG # BLD: 6.5 K/UL (ref 1.8–8)
NEUTS SEG NFR BLD: 73 % (ref 32–75)
NRBC # BLD: 0 K/UL (ref 0–0.01)
NRBC BLD-RTO: 0 PER 100 WBC
PCO2 BLDV: 49.3 MMHG (ref 41–51)
PH BLDV: 7.38 [PH] (ref 7.32–7.42)
PLATELET # BLD AUTO: 312 K/UL (ref 150–400)
PMV BLD AUTO: 9.3 FL (ref 8.9–12.9)
PO2 BLDV: 23 MMHG (ref 25–40)
POTASSIUM SERPL-SCNC: 2.8 MMOL/L (ref 3.5–5.1)
PROT SERPL-MCNC: 7.2 G/DL (ref 6.4–8.2)
RBC # BLD AUTO: 4.76 M/UL (ref 3.8–5.2)
SAMPLES BEING HELD,HOLD: NORMAL
SAO2 % BLDV: 38.7 % (ref 65–88)
SERVICE CMNT-IMP: ABNORMAL
SODIUM SERPL-SCNC: 140 MMOL/L (ref 136–145)
SOURCE, COVRS: NORMAL
SPECIMEN TYPE: ABNORMAL
WBC # BLD AUTO: 8.7 K/UL (ref 3.6–11)

## 2022-12-11 PROCEDURE — 87804 INFLUENZA ASSAY W/OPTIC: CPT

## 2022-12-11 PROCEDURE — 94640 AIRWAY INHALATION TREATMENT: CPT

## 2022-12-11 PROCEDURE — 96375 TX/PRO/DX INJ NEW DRUG ADDON: CPT

## 2022-12-11 PROCEDURE — 74011000636 HC RX REV CODE- 636: Performed by: RADIOLOGY

## 2022-12-11 PROCEDURE — 74011250637 HC RX REV CODE- 250/637: Performed by: STUDENT IN AN ORGANIZED HEALTH CARE EDUCATION/TRAINING PROGRAM

## 2022-12-11 PROCEDURE — 74011250636 HC RX REV CODE- 250/636: Performed by: STUDENT IN AN ORGANIZED HEALTH CARE EDUCATION/TRAINING PROGRAM

## 2022-12-11 PROCEDURE — 74011000250 HC RX REV CODE- 250: Performed by: STUDENT IN AN ORGANIZED HEALTH CARE EDUCATION/TRAINING PROGRAM

## 2022-12-11 PROCEDURE — 80053 COMPREHEN METABOLIC PANEL: CPT

## 2022-12-11 PROCEDURE — 71046 X-RAY EXAM CHEST 2 VIEWS: CPT

## 2022-12-11 PROCEDURE — 85025 COMPLETE CBC W/AUTO DIFF WBC: CPT

## 2022-12-11 PROCEDURE — 96366 THER/PROPH/DIAG IV INF ADDON: CPT

## 2022-12-11 PROCEDURE — 71275 CT ANGIOGRAPHY CHEST: CPT

## 2022-12-11 PROCEDURE — 96374 THER/PROPH/DIAG INJ IV PUSH: CPT

## 2022-12-11 PROCEDURE — 93005 ELECTROCARDIOGRAM TRACING: CPT

## 2022-12-11 PROCEDURE — 99285 EMERGENCY DEPT VISIT HI MDM: CPT

## 2022-12-11 PROCEDURE — 36415 COLL VENOUS BLD VENIPUNCTURE: CPT

## 2022-12-11 PROCEDURE — 82803 BLOOD GASES ANY COMBINATION: CPT

## 2022-12-11 PROCEDURE — 87635 SARS-COV-2 COVID-19 AMP PRB: CPT

## 2022-12-11 RX ORDER — MAGNESIUM SULFATE HEPTAHYDRATE 40 MG/ML
2 INJECTION, SOLUTION INTRAVENOUS ONCE
Status: COMPLETED | OUTPATIENT
Start: 2022-12-11 | End: 2022-12-11

## 2022-12-11 RX ORDER — PREDNISONE 10 MG/1
TABLET ORAL
Qty: 21 TABLET | Refills: 0 | Status: SHIPPED | OUTPATIENT
Start: 2022-12-11

## 2022-12-11 RX ORDER — DOXYCYCLINE HYCLATE 100 MG
100 TABLET ORAL EVERY 12 HOURS
Status: DISCONTINUED | OUTPATIENT
Start: 2022-12-11 | End: 2022-12-11 | Stop reason: HOSPADM

## 2022-12-11 RX ORDER — DOXYCYCLINE HYCLATE 100 MG
100 TABLET ORAL 2 TIMES DAILY
Qty: 14 TABLET | Refills: 0 | Status: SHIPPED | OUTPATIENT
Start: 2022-12-11 | End: 2022-12-18

## 2022-12-11 RX ORDER — POTASSIUM CHLORIDE 20 MEQ/1
20 TABLET, EXTENDED RELEASE ORAL 2 TIMES DAILY
Qty: 10 TABLET | Refills: 0 | Status: SHIPPED | OUTPATIENT
Start: 2022-12-11 | End: 2022-12-16

## 2022-12-11 RX ORDER — POTASSIUM CHLORIDE 750 MG/1
50 TABLET, FILM COATED, EXTENDED RELEASE ORAL
Status: COMPLETED | OUTPATIENT
Start: 2022-12-11 | End: 2022-12-11

## 2022-12-11 RX ORDER — IPRATROPIUM BROMIDE AND ALBUTEROL SULFATE 2.5; .5 MG/3ML; MG/3ML
3 SOLUTION RESPIRATORY (INHALATION)
Status: COMPLETED | OUTPATIENT
Start: 2022-12-11 | End: 2022-12-11

## 2022-12-11 RX ORDER — ALBUTEROL SULFATE 0.83 MG/ML
5 SOLUTION RESPIRATORY (INHALATION)
Status: COMPLETED | OUTPATIENT
Start: 2022-12-11 | End: 2022-12-11

## 2022-12-11 RX ADMIN — ALBUTEROL SULFATE 5 MG: 2.5 SOLUTION RESPIRATORY (INHALATION) at 08:30

## 2022-12-11 RX ADMIN — IOPAMIDOL 65 ML: 755 INJECTION, SOLUTION INTRAVENOUS at 12:17

## 2022-12-11 RX ADMIN — METHYLPREDNISOLONE SODIUM SUCCINATE 40 MG: 40 INJECTION, POWDER, FOR SOLUTION INTRAMUSCULAR; INTRAVENOUS at 08:13

## 2022-12-11 RX ADMIN — ALBUTEROL SULFATE 5 MG: 2.5 SOLUTION RESPIRATORY (INHALATION) at 12:45

## 2022-12-11 RX ADMIN — IPRATROPIUM BROMIDE AND ALBUTEROL SULFATE 3 ML: .5; 3 SOLUTION RESPIRATORY (INHALATION) at 10:17

## 2022-12-11 RX ADMIN — POTASSIUM CHLORIDE 50 MEQ: 750 TABLET, FILM COATED, EXTENDED RELEASE ORAL at 08:18

## 2022-12-11 RX ADMIN — IPRATROPIUM BROMIDE AND ALBUTEROL SULFATE 3 ML: .5; 3 SOLUTION RESPIRATORY (INHALATION) at 07:45

## 2022-12-11 RX ADMIN — DOXYCYCLINE HYCLATE 100 MG: 100 TABLET, COATED ORAL at 08:13

## 2022-12-11 RX ADMIN — MAGNESIUM SULFATE HEPTAHYDRATE 2 G: 40 INJECTION, SOLUTION INTRAVENOUS at 12:40

## 2022-12-11 NOTE — ED TRIAGE NOTES
COPD here with SOB and increased cough and WOB. Pt able to speak in full sentences. BS diminished in triage.

## 2022-12-11 NOTE — ED PROVIDER NOTES
Patient is a 68-year-old female history of COPD with continued tobacco use presenting today secondary to shortness of breath. She started 5 days ago with body aches, fever and fatigue. After a day or so she was feeling better however fever started again Thursday and then 2 days ago developed shortness of breath which got worse at night. Also reports some redness and itching to the left thigh. Grandchildren have been sick with similar. She reports that she has been using her albuterol inhaler and breathing treatments without improvement. Cough is productive, initially clear but now green/yellow sputum. She denies vomiting or diarrhea. No chest pain. No abdominal pain. No leg pain or leg swelling. Past Medical History:   Diagnosis Date    Anxiety     Chronic obstructive pulmonary disease ([address]     Depression     Diverticulitis     Hyperlipemia        Past Surgical History:   Procedure Laterality Date    HX  SECTION      HX HEART CATHETERIZATION  2013    HX OTHER SURGICAL      knee surgery          Family History:   Problem Relation Age of Onset    Heart Disease Mother     Hypertension Mother     COPD Father        Social History     Socioeconomic History    Marital status:      Spouse name: Not on file    Number of children: Not on file    Years of education: Not on file    Highest education level: Not on file   Occupational History    Not on file   Tobacco Use    Smoking status: Every Day     Packs/day: 0.50     Types: Cigarettes    Smokeless tobacco: Never   Substance and Sexual Activity    Alcohol use: No    Drug use: No    Sexual activity: Not on file   Other Topics Concern    Not on file   Social History Narrative    60year old   female admitted for rage episodes and depressed mood and severe intepersonal sensitivity. Pt. Has been on Ativan 1 mg po rip prn, which is probably contributing to her mood, poor anger mangement and anxiety. Pt now lives in a rented room after her marriaGE BROKE UP. sHE IS UNEMPLOYED. Social Determinants of Health     Financial Resource Strain: Not on file   Food Insecurity: Not on file   Transportation Needs: Not on file   Physical Activity: Not on file   Stress: Not on file   Social Connections: Not on file   Intimate Partner Violence: Not on file   Housing Stability: Not on file         ALLERGIES: Vicodin [hydrocodone-acetaminophen]    Review of Systems   Constitutional:  Positive for fatigue and fever. Negative for chills. HENT:  Positive for congestion. Negative for rhinorrhea. Eyes:  Positive for redness and itching. Negative for visual disturbance. Respiratory:  Positive for cough and shortness of breath. Cardiovascular:  Negative for chest pain and leg swelling. Gastrointestinal:  Negative for abdominal pain, diarrhea, nausea and vomiting. Genitourinary:  Negative for dysuria, flank pain, frequency, hematuria and urgency. Musculoskeletal:  Positive for myalgias. Negative for arthralgias, back pain and neck pain. Skin:  Negative for rash and wound. Allergic/Immunologic: Negative for immunocompromised state. Neurological:  Negative for dizziness and headaches. Vitals:    12/11/22 0657   BP: 126/72   Pulse: (!) 116   Resp: 20   Temp: 98.1 °F (36.7 °C)   SpO2: 95%   Weight: 72.6 kg (160 lb)   Height: 5' 2\" (1.575 m)            Physical Exam  Vitals and nursing note reviewed. Constitutional:       General: She is not in acute distress. Appearance: She is well-developed. She is not diaphoretic. HENT:      Head: Normocephalic. Mouth/Throat:      Pharynx: No oropharyngeal exudate. Eyes:      General:         Right eye: No discharge. Left eye: No discharge. Pupils: Pupils are equal, round, and reactive to light. Comments: Mild left conjunctival injection   Cardiovascular:      Rate and Rhythm: Regular rhythm. Tachycardia present. Heart sounds: Normal heart sounds. No murmur heard. No friction rub. No gallop. Pulmonary:      Comments: Bronchospastic cough noted  Speaking in full sentences but speaking does seem to cause labored breathing  Diminished breath sounds throughout with slight expiratory wheeze  Abdominal:      General: Bowel sounds are normal. There is no distension. Palpations: Abdomen is soft. Tenderness: There is no abdominal tenderness. There is no guarding or rebound. Musculoskeletal:         General: No deformity. Normal range of motion. Cervical back: Normal range of motion and neck supple. Skin:     General: Skin is warm and dry. Capillary Refill: Capillary refill takes less than 2 seconds. Findings: No rash. Neurological:      Mental Status: She is alert and oriented to person, place, and time. Psychiatric:         Behavior: Behavior normal.        [facility]    ED Course as of 12/11/22 0728   Sun Dec 11, 2022   0703 EKG time 7:03 AM  Sinus tachycardia rate of 115 with normal axis, narrow QRS, nonspecific ST-T wave changes without ST elevations or depressions [CC]      ED Course User Index  [CC] Sky Corbin, DO       Procedures  Work-up:  No leukocytosis or anemia  Hypokalemic, replaced with oral potassium  Electrolytes otherwise okay, renal function okay  COVID and flu negative    11:43 AM re-evaluated. Pt feeling much better than when she got here. Still quite diminished especially on the R. Will obtain CT to ensure not missing pneumonia. She did desaturate to 87% with ambulation but said she felt better walking now than when she had to walk to radiology earlier. Pt really not interested in admission as she would have to be transferred d/t RASHARD. Will give some IV mag, repeat tx, and re-assess. 2:58 PM re-evaluated. Still feeling better. Breath sounds improved although slightly diminished on R>L. No increased WOB. Speaking in full sentences. I did still offer admit given how many treatments she required but she declines.  She has plenty of albuterol for her nebulizer. She will return if any worsening of symptoms. CTA showed   Tree-in-bud opacities in the right upper and lower lobes and groundglass  opacities in the medial left upper lobe suggestive of bronchial  inflammation/infection. MDM:  The patient is a 58-year-old female presenting today with shortness of breath, cough in the setting of recent URI. Suspect viral etiology as the initial process especially since she has conjunctivitis in addition to URI symptoms. I suspect that this triggered COPD exacerbation. Here she received multiple breathing treatments (4 total), IV steroids, magnesium and doxycycline. I did discuss admission with her but she did not want to be admitted, especially to an outside facility (due to insurance). She did improve after breathing treatments and steroids. She was also given oral potassium for hypokalemia, presumably from using albuterol at home. Patient to be discharged home on doxycycline, prednisone and potassium. Return precautions provided. Total critical care time spent exclusive of procedures:  65      Due to a high probability of clinically significant, life threatening deterioration, the patient required my highest level of preparedness to intervene emergently and I personally spent this critical care time directly and personally managing the patient. This critical care time included obtaining a history; examining the patient; pulse oximetry; ordering and review of studies; arranging urgent treatment with development of a management plan; evaluation of patient's response to treatment; frequent reassessment; and, discussions with other providers. This critical care time was performed to assess and manage the high probability of imminent, life-threatening deterioration that could result in multi-organ failure. It was exclusive of separately billable procedures and treating other patients and teaching time.       Sky OWEN Shaquille DO

## 2022-12-12 NOTE — ED NOTES
Improved air movement. Left side heard greater than right. Right side remains grossly diminished. Pt with increased cough and decreased WOB.

## 2022-12-12 NOTE — ED NOTES
Pt with increased air movement but still significantly diminished, right greater than left. Increased wheezing and coughing.

## 2022-12-12 NOTE — ED NOTES
Pt continues to improve. Increased air movement on the left. No wheezing heard. Cough is decreased. Pt able to speak in full sentences. Right side still grossly diminished.

## 2022-12-13 LAB
ATRIAL RATE: 115 BPM
CALCULATED P AXIS, ECG09: 72 DEGREES
CALCULATED R AXIS, ECG10: 60 DEGREES
CALCULATED T AXIS, ECG11: 67 DEGREES
DIAGNOSIS, 93000: NORMAL
P-R INTERVAL, ECG05: 158 MS
Q-T INTERVAL, ECG07: 322 MS
QRS DURATION, ECG06: 80 MS
QTC CALCULATION (BEZET), ECG08: 445 MS
VENTRICULAR RATE, ECG03: 115 BPM

## 2023-11-06 SDOH — HEALTH STABILITY: PHYSICAL HEALTH: ON AVERAGE, HOW MANY MINUTES DO YOU ENGAGE IN EXERCISE AT THIS LEVEL?: 0 MIN

## 2023-11-06 SDOH — HEALTH STABILITY: PHYSICAL HEALTH: ON AVERAGE, HOW MANY DAYS PER WEEK DO YOU ENGAGE IN MODERATE TO STRENUOUS EXERCISE (LIKE A BRISK WALK)?: 0 DAYS

## 2023-11-07 PROBLEM — J44.1 COPD EXACERBATION (HCC): Status: RESOLVED | Noted: 2017-08-20 | Resolved: 2023-11-07

## 2023-11-07 PROBLEM — J11.1 INFLUENZA: Status: RESOLVED | Noted: 2018-02-20 | Resolved: 2023-11-07

## 2023-11-07 RX ORDER — OMEPRAZOLE 40 MG/1
CAPSULE, DELAYED RELEASE ORAL DAILY
COMMUNITY
Start: 2020-06-22

## 2023-11-07 NOTE — PROGRESS NOTES
[address] [facility] Office Visit     Assessment/ Plan: Chelsy Bautista is a 70 y.o. female presenting for:    [address] Maintenance - PMH, PSH, family history, social history, medications and allergies were reviewed and updated. -- records request for cardiologist, pulmonologist   -- referral for mammogram, colonoscopy     Obesity  Increased Fatigue - Increased weight gain in last several years, especially with needing prednisone for COPD flares. Active lifestyle. Much less energy, endurance than in the past.   -- CBC with diff, CMP, A1C, lipids, TSH, Vitamin D     Hypertension - New diagnosis. Established with cardiology [facility] - Dr. Deepti Dumont) and recently started on medication. Well-controlled today, asymptomatic. -- check metabolic panel  -- continue amlodipine-benazapril 10-20mg     COPD - Relatively well-controlled, follows with pulmonology (Dr. Jessenia Lopez). Has nebulizer, has needed oxygen in the past but not currently. -- continue Trelegy with prn albuterol/Duonebs   -- recommended pneumonia shot, will discuss in detail at follow-up     30 minutes were spent on the day of this encounter both with the patient and in related activities including chart review, care coordination and counseling. Patient instructions were discussed and/or provided in the AVS. The patient understands and agrees to the plan. RETURN TO CARE:   Future Appointments   Date Time Provider [address] [place] [address]   12/13/2023  2:00 PM Bennetta Dancer, DO [facility]         Subjective:  Chief Complaint   Patient presents with    Establish Care     Patient is coming in to establish care. Patient said she would like to talk to doctor fist to see what she would like to talk about. No other concerns. HPI:   Chelsy Bautista is a 70 y.o. female with PMH of COPD, HLD, depression and anxiety, fibromyalgia here for establishing care.      COPD  - Dr. Jessenia Lopez   - has been on home oxygen before   -

## 2023-11-08 ENCOUNTER — OFFICE VISIT (OUTPATIENT)
Age: 63
End: 2023-11-08
Payer: MEDICARE

## 2023-11-08 VITALS
DIASTOLIC BLOOD PRESSURE: 78 MMHG | TEMPERATURE: 98.5 F | WEIGHT: 167 LBS | HEART RATE: 92 BPM | RESPIRATION RATE: 18 BRPM | HEIGHT: 62 IN | BODY MASS INDEX: 30.73 KG/M2 | OXYGEN SATURATION: 97 % | SYSTOLIC BLOOD PRESSURE: 118 MMHG

## 2023-11-08 DIAGNOSIS — Z00.00 ENCOUNTER FOR MEDICAL EXAMINATION TO ESTABLISH CARE: Primary | ICD-10-CM

## 2023-11-08 DIAGNOSIS — R35.0 URINARY FREQUENCY: ICD-10-CM

## 2023-11-08 DIAGNOSIS — E66.9 CLASS 1 OBESITY WITHOUT SERIOUS COMORBIDITY WITH BODY MASS INDEX (BMI) OF 30.0 TO 30.9 IN ADULT, UNSPECIFIED OBESITY TYPE: ICD-10-CM

## 2023-11-08 DIAGNOSIS — R79.89 OTHER SPECIFIED ABNORMAL FINDINGS OF BLOOD CHEMISTRY: ICD-10-CM

## 2023-11-08 DIAGNOSIS — Z12.31 ENCOUNTER FOR SCREENING MAMMOGRAM FOR MALIGNANT NEOPLASM OF BREAST: ICD-10-CM

## 2023-11-08 DIAGNOSIS — E55.9 VITAMIN D DEFICIENCY: ICD-10-CM

## 2023-11-08 DIAGNOSIS — J44.9 CHRONIC OBSTRUCTIVE PULMONARY DISEASE, UNSPECIFIED COPD TYPE (HCC): ICD-10-CM

## 2023-11-08 DIAGNOSIS — Z87.19 HISTORY OF DIVERTICULITIS: ICD-10-CM

## 2023-11-08 DIAGNOSIS — Z12.11 SCREEN FOR COLON CANCER: ICD-10-CM

## 2023-11-08 DIAGNOSIS — G89.29 OTHER CHRONIC PAIN: ICD-10-CM

## 2023-11-08 DIAGNOSIS — E78.5 HYPERLIPIDEMIA, UNSPECIFIED HYPERLIPIDEMIA TYPE: ICD-10-CM

## 2023-11-08 DIAGNOSIS — E87.6 HYPOKALEMIA: ICD-10-CM

## 2023-11-08 DIAGNOSIS — R53.82 CHRONIC FATIGUE, UNSPECIFIED: ICD-10-CM

## 2023-11-08 PROBLEM — R00.0 SINUS TACHYCARDIA: Status: RESOLVED | Noted: 2018-02-19 | Resolved: 2023-11-08

## 2023-11-08 PROCEDURE — G8400 PT W/DXA NO RESULTS DOC: HCPCS | Performed by: FAMILY MEDICINE

## 2023-11-08 PROCEDURE — 3017F COLORECTAL CA SCREEN DOC REV: CPT | Performed by: FAMILY MEDICINE

## 2023-11-08 PROCEDURE — 1123F ACP DISCUSS/DSCN MKR DOCD: CPT | Performed by: FAMILY MEDICINE

## 2023-11-08 PROCEDURE — 4004F PT TOBACCO SCREEN RCVD TLK: CPT | Performed by: FAMILY MEDICINE

## 2023-11-08 PROCEDURE — 1090F PRES/ABSN URINE INCON ASSESS: CPT | Performed by: FAMILY MEDICINE

## 2023-11-08 PROCEDURE — G8417 CALC BMI ABV UP PARAM F/U: HCPCS | Performed by: FAMILY MEDICINE

## 2023-11-08 PROCEDURE — G8484 FLU IMMUNIZE NO ADMIN: HCPCS | Performed by: FAMILY MEDICINE

## 2023-11-08 PROCEDURE — G8427 DOCREV CUR MEDS BY ELIG CLIN: HCPCS | Performed by: FAMILY MEDICINE

## 2023-11-08 PROCEDURE — 99204 OFFICE O/P NEW MOD 45 MIN: CPT | Performed by: FAMILY MEDICINE

## 2023-11-08 PROCEDURE — 3023F SPIROM DOC REV: CPT | Performed by: FAMILY MEDICINE

## 2023-11-08 RX ORDER — AMLODIPINE BESYLATE AND BENAZEPRIL HYDROCHLORIDE 10; 20 MG/1; MG/1
CAPSULE ORAL DAILY
COMMUNITY
Start: 2023-10-30

## 2023-11-08 SDOH — ECONOMIC STABILITY: INCOME INSECURITY: HOW HARD IS IT FOR YOU TO PAY FOR THE VERY BASICS LIKE FOOD, HOUSING, MEDICAL CARE, AND HEATING?: NOT VERY HARD

## 2023-11-08 SDOH — ECONOMIC STABILITY: FOOD INSECURITY: WITHIN THE PAST 12 MONTHS, YOU WORRIED THAT YOUR FOOD WOULD RUN OUT BEFORE YOU GOT MONEY TO BUY MORE.: NEVER TRUE

## 2023-11-08 SDOH — ECONOMIC STABILITY: HOUSING INSECURITY
IN THE LAST 12 MONTHS, WAS THERE A TIME WHEN YOU DID NOT HAVE A STEADY PLACE TO SLEEP OR SLEPT IN A SHELTER (INCLUDING NOW)?: NO

## 2023-11-08 SDOH — ECONOMIC STABILITY: FOOD INSECURITY: WITHIN THE PAST 12 MONTHS, THE FOOD YOU BOUGHT JUST DIDN'T LAST AND YOU DIDN'T HAVE MONEY TO GET MORE.: NEVER TRUE

## 2023-11-08 ASSESSMENT — PATIENT HEALTH QUESTIONNAIRE - PHQ9
SUM OF ALL RESPONSES TO PHQ QUESTIONS 1-9: 6
SUM OF ALL RESPONSES TO PHQ QUESTIONS 1-9: 6
SUM OF ALL RESPONSES TO PHQ9 QUESTIONS 1 & 2: 2
3. TROUBLE FALLING OR STAYING ASLEEP: 2
2. FEELING DOWN, DEPRESSED OR HOPELESS: 1
SUM OF ALL RESPONSES TO PHQ QUESTIONS 1-9: 6
4. FEELING TIRED OR HAVING LITTLE ENERGY: 2
6. FEELING BAD ABOUT YOURSELF - OR THAT YOU ARE A FAILURE OR HAVE LET YOURSELF OR YOUR FAMILY DOWN: 0
5. POOR APPETITE OR OVEREATING: 0
7. TROUBLE CONCENTRATING ON THINGS, SUCH AS READING THE NEWSPAPER OR WATCHING TELEVISION: 0
9. THOUGHTS THAT YOU WOULD BE BETTER OFF DEAD, OR OF HURTING YOURSELF: 0
SUM OF ALL RESPONSES TO PHQ QUESTIONS 1-9: 6
8. MOVING OR SPEAKING SO SLOWLY THAT OTHER PEOPLE COULD HAVE NOTICED. OR THE OPPOSITE, BEING SO FIGETY OR RESTLESS THAT YOU HAVE BEEN MOVING AROUND A LOT MORE THAN USUAL: 0
1. LITTLE INTEREST OR PLEASURE IN DOING THINGS: 1

## 2023-11-08 ASSESSMENT — ANXIETY QUESTIONNAIRES
IF YOU CHECKED OFF ANY PROBLEMS ON THIS QUESTIONNAIRE, HOW DIFFICULT HAVE THESE PROBLEMS MADE IT FOR YOU TO DO YOUR WORK, TAKE CARE OF THINGS AT HOME, OR GET ALONG WITH OTHER PEOPLE: SOMEWHAT DIFFICULT
7. FEELING AFRAID AS IF SOMETHING AWFUL MIGHT HAPPEN: 0
3. WORRYING TOO MUCH ABOUT DIFFERENT THINGS: 1
6. BECOMING EASILY ANNOYED OR IRRITABLE: 1
5. BEING SO RESTLESS THAT IT IS HARD TO SIT STILL: 0
2. NOT BEING ABLE TO STOP OR CONTROL WORRYING: 0
1. FEELING NERVOUS, ANXIOUS, OR ON EDGE: 1
4. TROUBLE RELAXING: 0
GAD7 TOTAL SCORE: 3

## 2023-11-09 LAB
25(OH)D3 SERPL-MCNC: 13.8 NG/ML (ref 30–100)
ALBUMIN SERPL-MCNC: 3.8 G/DL (ref 3.5–5)
ALBUMIN/GLOB SERPL: 1.2 (ref 1.1–2.2)
ALP SERPL-CCNC: 114 U/L (ref 45–117)
ALT SERPL-CCNC: 26 U/L (ref 12–78)
ANION GAP SERPL CALC-SCNC: 5 MMOL/L (ref 5–15)
APPEARANCE UR: CLEAR
AST SERPL-CCNC: 18 U/L (ref 15–37)
BACTERIA URNS QL MICRO: NEGATIVE /HPF
BASOPHILS # BLD: 0.1 K/UL (ref 0–0.1)
BASOPHILS NFR BLD: 1 % (ref 0–1)
BILIRUB SERPL-MCNC: 0.5 MG/DL (ref 0.2–1)
BILIRUB UR QL: NEGATIVE
BUN SERPL-MCNC: 13 MG/DL (ref 6–20)
BUN/CREAT SERPL: 16 (ref 12–20)
CALCIUM SERPL-MCNC: 9.7 MG/DL (ref 8.5–10.1)
CHLORIDE SERPL-SCNC: 104 MMOL/L (ref 97–108)
CHOLEST SERPL-MCNC: 257 MG/DL
CO2 SERPL-SCNC: 30 MMOL/L (ref 21–32)
COLOR UR: ABNORMAL
CREAT SERPL-MCNC: 0.81 MG/DL (ref 0.55–1.02)
DIFFERENTIAL METHOD BLD: ABNORMAL
EOSINOPHIL # BLD: 0.3 K/UL (ref 0–0.4)
EOSINOPHIL NFR BLD: 3 % (ref 0–7)
EPITH CASTS URNS QL MICRO: ABNORMAL /LPF
ERYTHROCYTE [DISTWIDTH] IN BLOOD BY AUTOMATED COUNT: 13.5 % (ref 11.5–14.5)
EST. AVERAGE GLUCOSE BLD GHB EST-MCNC: 120 MG/DL
GLOBULIN SER CALC-MCNC: 3.2 G/DL (ref 2–4)
GLUCOSE SERPL-MCNC: 106 MG/DL (ref 65–100)
GLUCOSE UR STRIP.AUTO-MCNC: NEGATIVE MG/DL
HBA1C MFR BLD: 5.8 % (ref 4–5.6)
HCT VFR BLD AUTO: 47.2 % (ref 35–47)
HDLC SERPL-MCNC: 91 MG/DL
HDLC SERPL: 2.8 (ref 0–5)
HGB BLD-MCNC: 15 G/DL (ref 11.5–16)
HGB UR QL STRIP: ABNORMAL
HYALINE CASTS URNS QL MICRO: ABNORMAL /LPF (ref 0–5)
IMM GRANULOCYTES # BLD AUTO: 0.1 K/UL (ref 0–0.04)
IMM GRANULOCYTES NFR BLD AUTO: 1 % (ref 0–0.5)
KETONES UR QL STRIP.AUTO: NEGATIVE MG/DL
LDLC SERPL CALC-MCNC: 140 MG/DL (ref 0–100)
LEUKOCYTE ESTERASE UR QL STRIP.AUTO: NEGATIVE
LYMPHOCYTES # BLD: 2 K/UL (ref 0.8–3.5)
LYMPHOCYTES NFR BLD: 22 % (ref 12–49)
MCH RBC QN AUTO: 28.6 PG (ref 26–34)
MCHC RBC AUTO-ENTMCNC: 31.8 G/DL (ref 30–36.5)
MCV RBC AUTO: 89.9 FL (ref 80–99)
MONOCYTES # BLD: 0.6 K/UL (ref 0–1)
MONOCYTES NFR BLD: 6 % (ref 5–13)
NEUTS SEG # BLD: 6.3 K/UL (ref 1.8–8)
NEUTS SEG NFR BLD: 67 % (ref 32–75)
NITRITE UR QL STRIP.AUTO: NEGATIVE
NRBC # BLD: 0 K/UL (ref 0–0.01)
NRBC BLD-RTO: 0 PER 100 WBC
PH UR STRIP: 6.5 (ref 5–8)
PLATELET # BLD AUTO: 264 K/UL (ref 150–400)
PMV BLD AUTO: 9.4 FL (ref 8.9–12.9)
POTASSIUM SERPL-SCNC: 4.4 MMOL/L (ref 3.5–5.1)
PROT SERPL-MCNC: 7 G/DL (ref 6.4–8.2)
PROT UR STRIP-MCNC: NEGATIVE MG/DL
RBC # BLD AUTO: 5.25 M/UL (ref 3.8–5.2)
RBC #/AREA URNS HPF: ABNORMAL /HPF (ref 0–5)
SODIUM SERPL-SCNC: 139 MMOL/L (ref 136–145)
SP GR UR REFRACTOMETRY: 1.01 (ref 1–1.03)
SPECIMEN HOLD: NORMAL
TRIGL SERPL-MCNC: 130 MG/DL
TSH SERPL DL<=0.05 MIU/L-ACNC: 1.38 UIU/ML (ref 0.36–3.74)
UROBILINOGEN UR QL STRIP.AUTO: 0.2 EU/DL (ref 0.2–1)
VLDLC SERPL CALC-MCNC: 26 MG/DL
WBC # BLD AUTO: 9.3 K/UL (ref 3.6–11)
WBC URNS QL MICRO: ABNORMAL /HPF (ref 0–4)

## 2023-11-10 DIAGNOSIS — E55.9 VITAMIN D DEFICIENCY: Primary | ICD-10-CM

## 2023-11-10 PROBLEM — R73.03 PRE-DIABETES: Status: ACTIVE | Noted: 2023-11-10

## 2023-11-10 RX ORDER — ERGOCALCIFEROL 1.25 MG/1
50000 CAPSULE ORAL WEEKLY
Qty: 12 CAPSULE | Refills: 0 | Status: SHIPPED | OUTPATIENT
Start: 2023-11-10

## 2023-12-13 ENCOUNTER — OFFICE VISIT (OUTPATIENT)
Age: 63
End: 2023-12-13
Payer: MEDICARE

## 2023-12-13 VITALS
OXYGEN SATURATION: 96 % | HEART RATE: 99 BPM | DIASTOLIC BLOOD PRESSURE: 74 MMHG | RESPIRATION RATE: 18 BRPM | TEMPERATURE: 97.8 F | WEIGHT: 170 LBS | SYSTOLIC BLOOD PRESSURE: 106 MMHG | HEIGHT: 62 IN | BODY MASS INDEX: 31.28 KG/M2

## 2023-12-13 DIAGNOSIS — N36.8 URETHRAL CYST: ICD-10-CM

## 2023-12-13 DIAGNOSIS — Z00.00 INITIAL MEDICARE ANNUAL WELLNESS VISIT: Primary | ICD-10-CM

## 2023-12-13 DIAGNOSIS — M54.16 LUMBAR RADICULOPATHY: ICD-10-CM

## 2023-12-13 DIAGNOSIS — Z12.4 SCREENING FOR CERVICAL CANCER: ICD-10-CM

## 2023-12-13 DIAGNOSIS — Z13.820 SCREENING FOR OSTEOPOROSIS: ICD-10-CM

## 2023-12-13 DIAGNOSIS — E28.319 ASYMPTOMATIC PREMATURE MENOPAUSE: ICD-10-CM

## 2023-12-13 DIAGNOSIS — Z72.0 TOBACCO USE: ICD-10-CM

## 2023-12-13 PROCEDURE — 99213 OFFICE O/P EST LOW 20 MIN: CPT | Performed by: FAMILY MEDICINE

## 2023-12-13 RX ORDER — AMITRIPTYLINE HYDROCHLORIDE 10 MG/1
10 TABLET, FILM COATED ORAL NIGHTLY PRN
Qty: 30 TABLET | Refills: 3 | Status: SHIPPED | OUTPATIENT
Start: 2023-12-13

## 2023-12-13 ASSESSMENT — LIFESTYLE VARIABLES
HOW OFTEN DO YOU HAVE A DRINK CONTAINING ALCOHOL: 2
HOW OFTEN DO YOU HAVE SIX OR MORE DRINKS ON ONE OCCASION: 1
HOW MANY STANDARD DRINKS CONTAINING ALCOHOL DO YOU HAVE ON A TYPICAL DAY: 1

## 2023-12-13 NOTE — PATIENT INSTRUCTIONS
Personalized Preventive Plan for Kamini Landers - 12/13/2023  Medicare offers a range of preventive health benefits. Some of the tests and screenings are paid in full while other may be subject to a deductible, co-insurance, and/or copay. Some of these benefits include a comprehensive review of your medical history including lifestyle, illnesses that may run in your family, and various assessments and screenings as appropriate. After reviewing your medical record and screening and assessments performed today your provider may have ordered immunizations, labs, imaging, and/or referrals for you. A list of these orders (if applicable) as well as your Preventive Care list are included within your After Visit Summary for your review. Other Preventive Recommendations:    A preventive eye exam performed by an eye specialist is recommended every 1-2 years to screen for glaucoma; cataracts, macular degeneration, and other eye disorders. A preventive dental visit is recommended every 6 months. Try to get at least 150 minutes of exercise per week or 10,000 steps per day on a pedometer . Order or download the FREE \"Exercise & Physical Activity: Your Everyday Guide\" from [employer]. Call 3-997.178.4353 or search [employer] online. You need 0581-3151 mg of calcium and 4545-2056 IU of vitamin D per day. It is possible to meet your calcium requirement with diet alone, but a vitamin D supplement is usually necessary to meet this goal.  When exposed to the sun, use a sunscreen that protects against both UVA and UVB radiation with an SPF of 30 or greater. Reapply every 2 to 3 hours or after sweating, drying off with a towel, or swimming. Always wear a seat belt when traveling in a car. Always wear a helmet when riding a bicycle or motorcycle.

## 2023-12-13 NOTE — PROGRESS NOTES
Medicare Annual Wellness Visit    Carmen Yu is here for Medicare AWV (Patient is coming in for a medicare wellness and pap. Patient states that she is in pain in her back and legs that is getting worse in the last couple months. She said she can not bend and has trouble with it. No other concerns. )    Assessment & Plan   Initial Medicare annual wellness visit  Tobacco use  -     CT LUNG SCREENING (INITIAL/ANNUAL); Future  Screening for osteoporosis  -     DEXA BONE DENSITY AXIAL SKELETON; Future  Lumbar radiculopathy  -     amitriptyline (ELAVIL) 10 MG tablet; Take 1 tablet by mouth nightly as needed for Pain, Disp-30 tablet, R-3Normal  Asymptomatic premature menopause  -     DEXA BONE DENSITY AXIAL SKELETON; Future  Urethral cyst  -     [facility] - Kaylee Ho MD, Urogynecology, [place]  Screening for cervical cancer  -     PAP IG, Aptima HPV and rfx 16/18,45 ([id number]); Future     Recommendations for Preventive Services Due: see orders and patient instructions/AVS.  Recommended screening schedule for the next 5-10 years is provided to the patient in written form: see Patient Instructions/AVS.     No follow-ups on file. Chief Complaint   Patient presents with    Medicare AWV     Patient is coming in for a medicare wellness and pap. Patient states that she is in pain in her back and legs that is getting worse in the last couple months. She said she can not bend and has trouble with it. No other concerns. Subjective   Mrs. Roel Cruz is a 70yo female with PMH of COPD, borderline personality, anxiety and depression, HLD, pre-diabetes, Vitamin D deficiency, and chronic pain here for AWV.      Labs 11/8/23  - A1C 5.8  -  - The 10-year ASCVD risk score (Gian DK, et al., 2019) is: 6.6% - discuss statin  cardiologist Dr. Cindie Romberg last week   - ECHO and stress test normal   - Vitamin D - 13.8 (sent in HD)   - elevated RBC/Hct, immature granulocytes   - hematuria - small blood, RBC 5-10 (urinary

## 2023-12-14 ENCOUNTER — HOSPITAL ENCOUNTER (OUTPATIENT)
Facility: HOSPITAL | Age: 63
Setting detail: SPECIMEN
Discharge: HOME | End: 2023-12-14
Payer: MEDICARE

## 2023-12-14 PROCEDURE — 87624 HPV HI-RISK TYP POOLED RSLT: CPT

## 2023-12-14 PROCEDURE — 88175 CYTOPATH C/V AUTO FLUID REDO: CPT

## 2023-12-19 ENCOUNTER — PATIENT MESSAGE (OUTPATIENT)
Age: 63
End: 2023-12-19

## 2023-12-20 PROBLEM — R07.89 OTHER CHEST PAIN: Status: ACTIVE | Noted: 2023-12-20

## 2023-12-31 PROBLEM — K21.9 ESOPHAGEAL REFLUX: Status: ACTIVE | Noted: 2023-12-31

## 2023-12-31 PROBLEM — K44.9 ESOPHAGEAL HIATAL HERNIA: Status: ACTIVE | Noted: 2023-12-31

## 2023-12-31 PROBLEM — G93.2 IDIOPATHIC INTRACRANIAL HYPERTENSION: Status: ACTIVE | Noted: 2023-12-31

## 2023-12-31 PROBLEM — F17.200 TOBACCO USE DISORDER: Status: ACTIVE | Noted: 2023-12-31

## 2023-12-31 PROBLEM — E78.5 DYSLIPIDEMIA: Status: ACTIVE | Noted: 2023-12-31

## 2023-12-31 PROBLEM — H47.10 PAPILLEDEMA: Status: ACTIVE | Noted: 2023-12-31

## 2023-12-31 RX ORDER — SIMVASTATIN 20 MG
20 TABLET ORAL
COMMUNITY

## 2024-01-04 ENCOUNTER — OFFICE VISIT (OUTPATIENT)
Age: 64
End: 2024-01-04
Payer: MEDICARE

## 2024-01-04 VITALS
HEART RATE: 92 BPM | BODY MASS INDEX: 30.91 KG/M2 | SYSTOLIC BLOOD PRESSURE: 119 MMHG | OXYGEN SATURATION: 97 % | TEMPERATURE: 98.2 F | WEIGHT: 169 LBS | DIASTOLIC BLOOD PRESSURE: 82 MMHG

## 2024-01-04 DIAGNOSIS — S62.309D CLOSED FRACTURE OF METACARPAL BONE WITH ROUTINE HEALING, UNSPECIFIED FRACTURE MORPHOLOGY, UNSPECIFIED METACARPAL, UNSPECIFIED PORTION OF METACARPAL, SUBSEQUENT ENCOUNTER: ICD-10-CM
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
## 2024-01-04 DIAGNOSIS — R07.81 RIB PAIN ON RIGHT SIDE: ICD-10-CM

## 2024-01-04 PROCEDURE — 3017F COLORECTAL CA SCREEN DOC REV: CPT | Performed by: STUDENT IN AN ORGANIZED HEALTH CARE EDUCATION/TRAINING PROGRAM

## 2024-01-04 PROCEDURE — G8484 FLU IMMUNIZE NO ADMIN: HCPCS | Performed by: STUDENT IN AN ORGANIZED HEALTH CARE EDUCATION/TRAINING PROGRAM

## 2024-01-04 PROCEDURE — 4004F PT TOBACCO SCREEN RCVD TLK: CPT | Performed by: STUDENT IN AN ORGANIZED HEALTH CARE EDUCATION/TRAINING PROGRAM

## 2024-01-04 PROCEDURE — 1123F ACP DISCUSS/DSCN MKR DOCD: CPT | Performed by: STUDENT IN AN ORGANIZED HEALTH CARE EDUCATION/TRAINING PROGRAM

## 2024-01-04 PROCEDURE — G8417 CALC BMI ABV UP PARAM F/U: HCPCS | Performed by: STUDENT IN AN ORGANIZED HEALTH CARE EDUCATION/TRAINING PROGRAM

## 2024-01-04 PROCEDURE — 99214 OFFICE O/P EST MOD 30 MIN: CPT | Performed by: STUDENT IN AN ORGANIZED HEALTH CARE EDUCATION/TRAINING PROGRAM

## 2024-01-04 PROCEDURE — G8427 DOCREV CUR MEDS BY ELIG CLIN: HCPCS | Performed by: STUDENT IN AN ORGANIZED HEALTH CARE EDUCATION/TRAINING PROGRAM

## 2024-01-04 PROCEDURE — 1090F PRES/ABSN URINE INCON ASSESS: CPT | Performed by: STUDENT IN AN ORGANIZED HEALTH CARE EDUCATION/TRAINING PROGRAM

## 2024-01-04 PROCEDURE — G8400 PT W/DXA NO RESULTS DOC: HCPCS | Performed by: STUDENT IN AN ORGANIZED HEALTH CARE EDUCATION/TRAINING PROGRAM

## 2024-01-04 RX ORDER — METHOCARBAMOL 750 MG/1
750 TABLET, FILM COATED ORAL
Qty: 15 TABLET | Refills: 0 | Status: SHIPPED | OUTPATIENT
Start: 2024-01-04 | End: 2024-01-19

## 2024-01-04 NOTE — PATIENT INSTRUCTIONS
NSAIDs + acetaminophen work together to reduce inflammation and can control pain better than opioid derived medications!     Take 800mg ibuprofen (motrin, advil) + 1000mg acetaminophen (Tylenol) every 8 hours for the next 7 days. Then take as needed for pain.         Do eat food before taking NSAIDs (ibuprofen, naproxen).  Do not take more than 3200mg of ibuprofen or 3000mg acetaminophen daily.   Do not take more than 1500mg of naproxen daily.   Do not take this regimen for more than 2 weeks without discussion with your doctor.
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
## 2024-01-10 ENCOUNTER — OFFICE VISIT (OUTPATIENT)
Age: 64
End: 2024-01-10
Payer: MEDICARE

## 2024-01-10 VITALS
RESPIRATION RATE: 16 BRPM | SYSTOLIC BLOOD PRESSURE: 120 MMHG | BODY MASS INDEX: 30.91 KG/M2 | HEIGHT: 62 IN | OXYGEN SATURATION: 99 % | HEART RATE: 84 BPM | WEIGHT: 168 LBS | DIASTOLIC BLOOD PRESSURE: 80 MMHG | TEMPERATURE: 98 F

## 2024-01-10 DIAGNOSIS — M79.642 LEFT HAND PAIN: ICD-10-CM

## 2024-01-10 DIAGNOSIS — M25.531 RIGHT WRIST PAIN: ICD-10-CM
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
## 2024-01-10 PROCEDURE — 99214 OFFICE O/P EST MOD 30 MIN: CPT | Performed by: STUDENT IN AN ORGANIZED HEALTH CARE EDUCATION/TRAINING PROGRAM

## 2024-01-10 ASSESSMENT — PATIENT HEALTH QUESTIONNAIRE - PHQ9
7. TROUBLE CONCENTRATING ON THINGS, SUCH AS READING THE NEWSPAPER OR WATCHING TELEVISION: 0
1. LITTLE INTEREST OR PLEASURE IN DOING THINGS: 1
SUM OF ALL RESPONSES TO PHQ9 QUESTIONS 1 & 2: 2
10. IF YOU CHECKED OFF ANY PROBLEMS, HOW DIFFICULT HAVE THESE PROBLEMS MADE IT FOR YOU TO DO YOUR WORK, TAKE CARE OF THINGS AT HOME, OR GET ALONG WITH OTHER PEOPLE: 3
SUM OF ALL RESPONSES TO PHQ QUESTIONS 1-9: 6
SUM OF ALL RESPONSES TO PHQ QUESTIONS 1-9: 6
3. TROUBLE FALLING OR STAYING ASLEEP: 3
6. FEELING BAD ABOUT YOURSELF - OR THAT YOU ARE A FAILURE OR HAVE LET YOURSELF OR YOUR FAMILY DOWN: 0
2. FEELING DOWN, DEPRESSED OR HOPELESS: 1
9. THOUGHTS THAT YOU WOULD BE BETTER OFF DEAD, OR OF HURTING YOURSELF: 0
SUM OF ALL RESPONSES TO PHQ QUESTIONS 1-9: 6
4. FEELING TIRED OR HAVING LITTLE ENERGY: 0
SUM OF ALL RESPONSES TO PHQ QUESTIONS 1-9: 6
8. MOVING OR SPEAKING SO SLOWLY THAT OTHER PEOPLE COULD HAVE NOTICED. OR THE OPPOSITE, BEING SO FIGETY OR RESTLESS THAT YOU HAVE BEEN MOVING AROUND A LOT MORE THAN USUAL: 1
5. POOR APPETITE OR OVEREATING: 0
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
## 2024-01-15 ENCOUNTER — TELEPHONE (OUTPATIENT)
Age: 64
End: 2024-01-15

## 2024-01-19 DIAGNOSIS — S62.024D CLOSED NONDISPLACED FRACTURE OF MIDDLE THIRD OF SCAPHOID OF RIGHT WRIST WITH ROUTINE HEALING, SUBSEQUENT ENCOUNTER: ICD-10-CM

## 2024-01-19 DIAGNOSIS — S62.346D CLOSED NONDISPLACED FRACTURE OF BASE OF FIFTH METACARPAL BONE OF RIGHT HAND WITH ROUTINE HEALING, SUBSEQUENT ENCOUNTER: Primary | ICD-10-CM

## 2024-01-24 ENCOUNTER — TELEPHONE (OUTPATIENT)
Age: 64
End: 2024-01-24

## 2024-01-24 NOTE — TELEPHONE ENCOUNTER
----- Message from Julia Lopez sent at 1/23/2024  4:31 PM EST -----  Subject: Message to Provider    QUESTIONS  Information for Provider? Pt is calling in states she is needing copies of   her XR HAND LEFT (MIN 3 VIEWS) XR HAND RIGHT (MIN 3 VIEWS). Please advise.     ---------------------------------------------------------------------------  --------------  CALL BACK INFO  [phone]; OK to leave message on voicemail  ---------------------------------------------------------------------------  --------------  SCRIPT ANSWERS  Relationship to Patient? Self

## 2024-02-01 ENCOUNTER — TELEPHONE (OUTPATIENT)
Age: 64
End: 2024-02-01

## 2024-02-01 ENCOUNTER — TRANSCRIBE ORDERS (OUTPATIENT)
Facility: HOSPITAL | Age: 64
End: 2024-02-01

## 2024-02-01 DIAGNOSIS — S63.501A SPRAIN OF RIGHT FOREARM, INITIAL ENCOUNTER: Primary | ICD-10-CM

## 2024-02-09 ENCOUNTER — HOSPITAL ENCOUNTER (OUTPATIENT)
Facility: HOSPITAL | Age: 64
End: 2024-02-09
Attending: ORTHOPAEDIC SURGERY
Payer: MEDICARE

## 2024-02-09 DIAGNOSIS — S63.501A SPRAIN OF RIGHT FOREARM, INITIAL ENCOUNTER: ICD-10-CM

## 2024-02-09 PROCEDURE — 73200 CT UPPER EXTREMITY W/O DYE: CPT

## 2024-02-20 ENCOUNTER — TELEPHONE (OUTPATIENT)
Age: 64
End: 2024-02-20

## 2024-02-20 DIAGNOSIS — E55.9 VITAMIN D DEFICIENCY: Primary | ICD-10-CM

## 2024-02-20 NOTE — TELEPHONE ENCOUNTER
Ordered Vitamin D level. Patient can be called to schedule lab visit.     Ana Phelan, DO  2/20/2024 5:30 PM

## 2024-02-20 NOTE — TELEPHONE ENCOUNTER
Please see previous message. If the patient can come in to get the Vitamin D lab and you have ended the order please let us know so we can call to schedule an appointment for her. Please advise.
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
## 2024-02-22 ENCOUNTER — NURSE ONLY (OUTPATIENT)
Age: 64
End: 2024-02-22

## 2024-02-22 DIAGNOSIS — E55.9 VITAMIN D DEFICIENCY: ICD-10-CM

## 2024-02-23 LAB — 25(OH)D3 SERPL-MCNC: 43.7 NG/ML (ref 30–100)

## 2024-04-08 ENCOUNTER — HOSPITAL ENCOUNTER (OUTPATIENT)
Facility: HOSPITAL | Age: 64
Discharge: HOME | End: 2024-04-11
Payer: MEDICARE

## 2024-04-08 VITALS
BODY MASS INDEX: 31.83 KG/M2 | OXYGEN SATURATION: 99 % | WEIGHT: 173 LBS | RESPIRATION RATE: 20 BRPM | HEIGHT: 62 IN | DIASTOLIC BLOOD PRESSURE: 95 MMHG | TEMPERATURE: 97.5 F | HEART RATE: 87 BPM | SYSTOLIC BLOOD PRESSURE: 137 MMHG

## 2024-04-08 LAB
ANION GAP SERPL CALC-SCNC: 5 MMOL/L (ref 5–15)
BUN SERPL-MCNC: 12 MG/DL (ref 6–20)
BUN/CREAT SERPL: 16 (ref 12–20)
CALCIUM SERPL-MCNC: 9.4 MG/DL (ref 8.5–10.1)
CHLORIDE SERPL-SCNC: 109 MMOL/L (ref 97–108)
CO2 SERPL-SCNC: 27 MMOL/L (ref 21–32)
CREAT SERPL-MCNC: 0.74 MG/DL (ref 0.55–1.02)
EKG ATRIAL RATE: 80 BPM
EKG DIAGNOSIS: NORMAL
EKG P AXIS: 63 DEGREES
EKG P-R INTERVAL: 176 MS
EKG Q-T INTERVAL: 384 MS
EKG QRS DURATION: 78 MS
EKG QTC CALCULATION (BAZETT): 442 MS
EKG R AXIS: 56 DEGREES
EKG T AXIS: 76 DEGREES
EKG VENTRICULAR RATE: 80 BPM
GLUCOSE SERPL-MCNC: 109 MG/DL (ref 65–100)
POTASSIUM SERPL-SCNC: 4.1 MMOL/L (ref 3.5–5.1)
SODIUM SERPL-SCNC: 141 MMOL/L (ref 136–145)

## 2024-04-08 PROCEDURE — 93005 ELECTROCARDIOGRAM TRACING: CPT | Performed by: ORTHOPAEDIC SURGERY

## 2024-04-08 PROCEDURE — 36415 COLL VENOUS BLD VENIPUNCTURE: CPT

## 2024-04-08 PROCEDURE — 80048 BASIC METABOLIC PNL TOTAL CA: CPT

## 2024-04-08 PROCEDURE — 93010 ELECTROCARDIOGRAM REPORT: CPT | Performed by: INTERNAL MEDICINE

## 2024-04-08 RX ORDER — AZITHROMYCIN 500 MG/1
500 TABLET, FILM COATED ORAL
COMMUNITY

## 2024-04-08 RX ORDER — CHOLECALCIFEROL (VITAMIN D3) 50 MCG
2000 TABLET ORAL DAILY
COMMUNITY

## 2024-04-08 NOTE — PERIOP NOTE
BP was elevated at time of PAT.  Pt denies headache, blurred vision, lightheadedness or other symptoms.  Pt states \"That's normal for me.  My blood pressure fluctuates.\"   She also reports that \"my blood pressure medication was increased about four or five weeks ago.\" BP is managed by cardiologist.  The pt was instructed to monitor BP at home, seek medical evaluation if BP remains elevated and/or symptoms develop, and call PAT with update.  Pt verbalizes understanding and agreement. Pt provided with [facility] Blood Pressure education packet.    Last OVN/EKG requested from Dr Titus office.   Last OVN requested from DR Wick office.     Pt has questions regarding procedure; she will call Dr Gloria before DOS.

## 2024-04-08 NOTE — PERIOP NOTE
[facility]                   [address] [place] [address]   MAIN OR                                  [phone]   MAIN PRE OP                          [phone]                                                                                AMBULATORY PRE OP          [phone]  PRE-ADMISSION TESTING    [phone]     Surgery Date:  Friday 4/12/2024       Is surgery arrival time given by surgeon?    If “NO”, [place] staff will call you between 3 and 7pm the day before your surgery with your arrival time.     Call [phone] after 7pm Monday-Friday if you did not receive this call.    INSTRUCTIONS BEFORE YOUR SURGERY   When You  Arrive Free  parking is available from 7:00 AM - 5:00 PM.  Arrive at the 2nd Floor Admitting Desk on the day of your surgery  Have your insurance card, photo ID, and any copayment (if needed)   Food   and   Drink NO food or drink after midnight the night before surgery    This means NO water, gum, mints, coffee, juice, etc.  No alcohol (beer, wine, liquor) 24 hours before and after surgery   Medications to   TAKE   Morning of Surgery MEDICATIONS TO TAKE THE MORNING OF SURGERY WITH A SIP OF WATER:     Trelegy  Prilosec  Albuterol inhaler if needed (bring with you on day of surgery)    DO NOT take Amlodipine/ Benazepril the morning of surgery.    Tylenol may be taken as needed.   Medications  To  STOP      7 days before surgery Non-Steroidal anti-inflammatory Drugs (NSAID's): for example, Ibuprofen (Advil, Motrin), Naproxen (Aleve)  Aspirin, if taking for pain   Herbal supplements, vitamins, and fish oil  Other:     Blood  Thinners If you take  Aspirin, Plavix, Coumadin, or any blood-thinning or anti-blood clot medicine, talk to the doctor who prescribed the medications for pre-operative instructions.   Bathing Clothing  Jewelry  Valuables     If you shower the morning of surgery, please do not apply anything to your skin (lotions,

## 2024-04-09 ENCOUNTER — ANESTHESIA EVENT (OUTPATIENT)
Facility: HOSPITAL | Age: 64
End: 2024-04-09
Payer: MEDICARE

## 2024-04-09 NOTE — DISCHARGE INSTRUCTIONS
DISCHARGE SUMMARY from your Nurse    The following personal items collected during your admission are returned to you:   Dental Appliance:    Vision:    Hearing Aid:    Jewelry:    Clothing:    Other Valuables:    Valuables sent to safe: Dose (mL/hr) Propofol : 0 mL/hr      PATIENT INSTRUCTIONS:    After general anesthesia or intravenous sedation, for 24 hours or while taking prescription Narcotics:  Limit your activities  Do not drive and operate hazardous machinery  Do not make important personal or business decisions  Do  not drink alcoholic beverages  If there is redness at the IV site you should apply a warm compress to the area.  If redness or soreness persist call your primary care physician.      These are general instructions for a healthy lifestyle:    *  Please give a list of your current medications to your Primary Care Provider.  *  Please update this list whenever your medications are discontinued, doses are      changed, or new medications (including over-the-counter products) are added.  *  Please carry medication information at all times in case of emergency situations.    No smoking / No tobacco products / Avoid exposure to second hand smoke    Surgeon General's Warning:  Quitting smoking now greatly reduces serious risk to your health.    Obesity, smoking, and sedentary lifestyle greatly increases your risk for illness and disease.  A healthy diet, regular physical exercise & weight monitoring are important for maintaining a healthy lifestyle.      Congestive Heart Failure  You may be retaining fluid if you have a history of heart failure or if you experience any of the following symptoms:  Weight gain of 3 pounds or more overnight or 5 pounds in a week, increased swelling in our hands or feet or shortness of breath while lying flat in bed.  Please call your doctor as soon as you notice any of these symptoms; do not wait until your next office visit.    Recognize signs and symptoms of STROKE:  F -  always get to the phone. However, you may choose to call [phone] during office hours (8am-5pm) to leave a voicemail, which will be addressed by the end of the workday.      After hours and on weekends (outside of 8am-5pm Monday-Friday) please call [phone] to speak with the on-call provider.

## 2024-04-10 NOTE — PERIOP NOTE
Called patient to follow up regarding elevated blood pressures. Patient states she was instructed to check her blood pressures at home and call PCP if elevated. BP reading on 4/8 was 124/85; 4/9 was 135/87 and 131/87. Informed patient these readings were acceptable and we would wait to see what BP is on the day of surgery. No further intervention requested.

## 2024-04-11 NOTE — PERIOP NOTE
Hello,     You are scheduled to have surgery tomorrow at [facility].     We would like for you to arrive at  10:00 am  We are located on the second floor, [address]. You will check-in at the registration desk located outside the elevators on the second floor prior to proceeding to [address].  Remember nothing to eat or drink after midnight. If you need to take medications the morning of surgery, please take with a few sips of water.   Wear loose, comfortable clothing and leave all your jewelry at home.   You may bring your cell phone with you.  One family member will be allowed in the pre-op area once you are dressed and your IV has been started.   You will need someone to drive you home and be with you for 24 hours post-anesthesia.     We look forward to seeing you! Call [phone] for questions after hours and [phone] between 5:30AM and 6PM.     Thanks!    [facility] ASU PREOP TEAM

## 2024-04-12 ENCOUNTER — HOSPITAL ENCOUNTER (OUTPATIENT)
Facility: HOSPITAL | Age: 64
Discharge: HOME | End: 2024-04-15

## 2024-04-12 ENCOUNTER — ANESTHESIA (OUTPATIENT)
Facility: HOSPITAL | Age: 64
End: 2024-04-12
Payer: MEDICARE

## 2024-04-12 ENCOUNTER — HOSPITAL ENCOUNTER (OUTPATIENT)
Facility: HOSPITAL | Age: 64
Setting detail: OUTPATIENT SURGERY
Discharge: HOME | End: 2024-04-12
Attending: ORTHOPAEDIC SURGERY | Admitting: ORTHOPAEDIC SURGERY
Payer: MEDICARE

## 2024-04-12 VITALS
DIASTOLIC BLOOD PRESSURE: 57 MMHG | SYSTOLIC BLOOD PRESSURE: 101 MMHG | HEART RATE: 87 BPM | HEIGHT: 62 IN | BODY MASS INDEX: 31.56 KG/M2 | RESPIRATION RATE: 18 BRPM | OXYGEN SATURATION: 87 % | TEMPERATURE: 98 F | WEIGHT: 171.52 LBS

## 2024-04-12 DIAGNOSIS — S62.001K CLOSED NONDISPLACED FRACTURE OF SCAPHOID OF RIGHT WRIST WITH NONUNION, UNSPECIFIED PORTION OF SCAPHOID, SUBSEQUENT ENCOUNTER: Primary | ICD-10-CM

## 2024-04-12 PROCEDURE — 2720000010 HC SURG SUPPLY STERILE: Performed by: ORTHOPAEDIC SURGERY

## 2024-04-12 PROCEDURE — 6360000002 HC RX W HCPCS: Performed by: ANESTHESIOLOGY

## 2024-04-12 PROCEDURE — C1769 GUIDE WIRE: HCPCS | Performed by: ORTHOPAEDIC SURGERY

## 2024-04-12 PROCEDURE — 7100000001 HC PACU RECOVERY - ADDTL 15 MIN: Performed by: ORTHOPAEDIC SURGERY

## 2024-04-12 PROCEDURE — 6360000002 HC RX W HCPCS: Performed by: PHYSICIAN ASSISTANT

## 2024-04-12 PROCEDURE — 2580000003 HC RX 258: Performed by: ANESTHESIOLOGY

## 2024-04-12 PROCEDURE — 7100000011 HC PHASE II RECOVERY - ADDTL 15 MIN: Performed by: ORTHOPAEDIC SURGERY

## 2024-04-12 PROCEDURE — 7100000010 HC PHASE II RECOVERY - FIRST 15 MIN: Performed by: ORTHOPAEDIC SURGERY

## 2024-04-12 PROCEDURE — 3700000001 HC ADD 15 MINUTES (ANESTHESIA): Performed by: ORTHOPAEDIC SURGERY

## 2024-04-12 PROCEDURE — 6360000002 HC RX W HCPCS: Performed by: NURSE ANESTHETIST, CERTIFIED REGISTERED

## 2024-04-12 PROCEDURE — 2580000003 HC RX 258: Performed by: PHYSICIAN ASSISTANT

## 2024-04-12 PROCEDURE — 64417 NJX AA&/STRD AX NERVE IMG: CPT | Performed by: ANESTHESIOLOGY

## 2024-04-12 PROCEDURE — 3700000000 HC ANESTHESIA ATTENDED CARE: Performed by: ORTHOPAEDIC SURGERY

## 2024-04-12 PROCEDURE — 2709999900 HC NON-CHARGEABLE SUPPLY: Performed by: ORTHOPAEDIC SURGERY

## 2024-04-12 PROCEDURE — A4217 STERILE WATER/SALINE, 500 ML: HCPCS | Performed by: ORTHOPAEDIC SURGERY

## 2024-04-12 PROCEDURE — 3600000014 HC SURGERY LEVEL 4 ADDTL 15MIN: Performed by: ORTHOPAEDIC SURGERY

## 2024-04-12 PROCEDURE — 2580000003 HC RX 258: Performed by: ORTHOPAEDIC SURGERY

## 2024-04-12 PROCEDURE — 3600000004 HC SURGERY LEVEL 4 BASE: Performed by: ORTHOPAEDIC SURGERY

## 2024-04-12 PROCEDURE — 7100000000 HC PACU RECOVERY - FIRST 15 MIN: Performed by: ORTHOPAEDIC SURGERY

## 2024-04-12 PROCEDURE — C1713 ANCHOR/SCREW BN/BN,TIS/BN: HCPCS | Performed by: ORTHOPAEDIC SURGERY

## 2024-04-12 DEVICE — 18.0MM, MINI ACUTRAK 2® BONE SCREW
Type: IMPLANTABLE DEVICE | Site: WRIST | Status: FUNCTIONAL
Brand: ACUMED

## 2024-04-12 RX ORDER — SODIUM CHLORIDE, SODIUM LACTATE, POTASSIUM CHLORIDE, CALCIUM CHLORIDE 600; 310; 30; 20 MG/100ML; MG/100ML; MG/100ML; MG/100ML
INJECTION, SOLUTION INTRAVENOUS CONTINUOUS
Status: DISCONTINUED | OUTPATIENT
Start: 2024-04-12 | End: 2024-04-12 | Stop reason: HOSPADM

## 2024-04-12 RX ORDER — PROPOFOL 10 MG/ML
INJECTION, EMULSION INTRAVENOUS CONTINUOUS PRN
Status: DISCONTINUED | OUTPATIENT
Start: 2024-04-12 | End: 2024-04-12 | Stop reason: SDUPTHER

## 2024-04-12 RX ORDER — NALOXONE HYDROCHLORIDE 0.4 MG/ML
INJECTION, SOLUTION INTRAMUSCULAR; INTRAVENOUS; SUBCUTANEOUS PRN
Status: DISCONTINUED | OUTPATIENT
Start: 2024-04-12 | End: 2024-04-12 | Stop reason: HOSPADM

## 2024-04-12 RX ORDER — ONDANSETRON 4 MG/1
4 TABLET, FILM COATED ORAL 3 TIMES DAILY PRN
Qty: 15 TABLET | Refills: 0 | Status: SHIPPED | OUTPATIENT
Start: 2024-04-12

## 2024-04-12 RX ORDER — OXYCODONE HYDROCHLORIDE AND ACETAMINOPHEN 5; 325 MG/1; MG/1
1 TABLET ORAL EVERY 4 HOURS PRN
Qty: 30 TABLET | Refills: 0 | Status: SHIPPED | OUTPATIENT
Start: 2024-04-12 | End: 2024-04-19

## 2024-04-12 RX ORDER — MIDAZOLAM HYDROCHLORIDE 1 MG/ML
INJECTION INTRAMUSCULAR; INTRAVENOUS PRN
Status: DISCONTINUED | OUTPATIENT
Start: 2024-04-12 | End: 2024-04-12 | Stop reason: SDUPTHER

## 2024-04-12 RX ORDER — ALBUTEROL SULFATE 2.5 MG/3ML
2.5 SOLUTION RESPIRATORY (INHALATION) ONCE
Status: COMPLETED | OUTPATIENT
Start: 2024-04-12 | End: 2024-04-12

## 2024-04-12 RX ORDER — FENTANYL CITRATE 50 UG/ML
100 INJECTION, SOLUTION INTRAMUSCULAR; INTRAVENOUS
Status: COMPLETED | OUTPATIENT
Start: 2024-04-12 | End: 2024-04-12

## 2024-04-12 RX ORDER — LIDOCAINE HYDROCHLORIDE 10 MG/ML
1 INJECTION, SOLUTION EPIDURAL; INFILTRATION; INTRACAUDAL; PERINEURAL
Status: DISCONTINUED | OUTPATIENT
Start: 2024-04-12 | End: 2024-04-12 | Stop reason: HOSPADM

## 2024-04-12 RX ORDER — DIPHENHYDRAMINE HYDROCHLORIDE 50 MG/ML
12.5 INJECTION INTRAMUSCULAR; INTRAVENOUS
Status: DISCONTINUED | OUTPATIENT
Start: 2024-04-12 | End: 2024-04-12 | Stop reason: HOSPADM

## 2024-04-12 RX ORDER — DOCUSATE SODIUM 100 MG/1
100 CAPSULE, LIQUID FILLED ORAL DAILY PRN
Qty: 30 CAPSULE | Refills: 0 | Status: SHIPPED | OUTPATIENT
Start: 2024-04-12

## 2024-04-12 RX ORDER — MIDAZOLAM HYDROCHLORIDE 2 MG/2ML
2 INJECTION, SOLUTION INTRAMUSCULAR; INTRAVENOUS
Status: DISCONTINUED | OUTPATIENT
Start: 2024-04-12 | End: 2024-04-12 | Stop reason: HOSPADM

## 2024-04-12 RX ORDER — ROPIVACAINE HYDROCHLORIDE 5 MG/ML
INJECTION, SOLUTION EPIDURAL; INFILTRATION; PERINEURAL
Status: DISCONTINUED | OUTPATIENT
Start: 2024-04-12 | End: 2024-04-12 | Stop reason: SDUPTHER

## 2024-04-12 RX ORDER — ONDANSETRON 2 MG/ML
4 INJECTION INTRAMUSCULAR; INTRAVENOUS
Status: DISCONTINUED | OUTPATIENT
Start: 2024-04-12 | End: 2024-04-12 | Stop reason: HOSPADM

## 2024-04-12 RX ADMIN — ROPIVACAINE HYDROCHLORIDE 20 ML: 5 INJECTION, SOLUTION EPIDURAL; INFILTRATION; PERINEURAL at 11:55

## 2024-04-12 RX ADMIN — FENTANYL CITRATE 50 MCG: 50 INJECTION, SOLUTION INTRAMUSCULAR; INTRAVENOUS at 13:10

## 2024-04-12 RX ADMIN — FENTANYL CITRATE 50 MCG: 50 INJECTION, SOLUTION INTRAMUSCULAR; INTRAVENOUS at 11:50

## 2024-04-12 RX ADMIN — WATER 2000 MG: 1 INJECTION INTRAMUSCULAR; INTRAVENOUS; SUBCUTANEOUS at 13:28

## 2024-04-12 RX ADMIN — MIDAZOLAM HYDROCHLORIDE 2 MG: 1 INJECTION, SOLUTION INTRAMUSCULAR; INTRAVENOUS at 13:10

## 2024-04-12 RX ADMIN — PROPOFOL 30 MG: 10 INJECTION, EMULSION INTRAVENOUS at 13:27

## 2024-04-12 RX ADMIN — MIDAZOLAM HYDROCHLORIDE 2 MG: 1 INJECTION, SOLUTION INTRAMUSCULAR; INTRAVENOUS at 11:50

## 2024-04-12 RX ADMIN — SODIUM CHLORIDE, POTASSIUM CHLORIDE, SODIUM LACTATE AND CALCIUM CHLORIDE: 600; 310; 30; 20 INJECTION, SOLUTION INTRAVENOUS at 13:30

## 2024-04-12 RX ADMIN — SODIUM CHLORIDE, POTASSIUM CHLORIDE, SODIUM LACTATE AND CALCIUM CHLORIDE: 600; 310; 30; 20 INJECTION, SOLUTION INTRAVENOUS at 10:53

## 2024-04-12 RX ADMIN — PROPOFOL 50 MCG/KG/MIN: 10 INJECTION, EMULSION INTRAVENOUS at 13:15

## 2024-04-12 RX ADMIN — PROPOFOL 20 MG: 10 INJECTION, EMULSION INTRAVENOUS at 13:16

## 2024-04-12 RX ADMIN — ALBUTEROL SULFATE 2.5 MG: 2.5 SOLUTION RESPIRATORY (INHALATION) at 11:28

## 2024-04-12 ASSESSMENT — PAIN SCALES - GENERAL
PAINLEVEL_OUTOF10: 0
PAINLEVEL_OUTOF10: 0

## 2024-04-12 ASSESSMENT — PAIN - FUNCTIONAL ASSESSMENT: PAIN_FUNCTIONAL_ASSESSMENT: 0-10

## 2024-04-12 ASSESSMENT — COPD QUESTIONNAIRES: CAT_SEVERITY: SEVERE

## 2024-04-12 NOTE — ANESTHESIA POSTPROCEDURE EVALUATION
Department of Anesthesiology  Postprocedure Note    Patient: Araceli Segundo  MRN: [id number]  YOB: 1960  Date of evaluation: 4/12/2024    Procedure Summary       Date: 04/12/24 Room / Location: [facility] A[facility] OR [address] AMBULATORY OR    Anesthesia Start: 1307 Anesthesia Stop: 1459    Procedure: RIGHT SCAPHOID NON UNION REPAIR WITH CANCELLOUS AUTOGRAFT (MAC WITH REGIONAL BLOCK) (Right: Wrist) Diagnosis:       Closed nondisplaced fracture of base of fifth metacarpal bone of left hand, initial encounter      (Closed nondisplaced fracture of base of fifth metacarpal bone of left hand, initial encounter [S62.347A])    Surgeons: Héctor Gloria MD Responsible Provider: Mina Fraser MD    Anesthesia Type: Regional, MAC ASA Status: 3            Anesthesia Type: Regional, MAC    Barbara Phase I: Barbara Score: 7    Barbara Phase II:      Anesthesia Post Evaluation    Patient location during evaluation: PACU  Patient participation: complete - patient participated  Level of consciousness: awake  Airway patency: patent  Nausea & Vomiting: no vomiting and no nausea  Cardiovascular status: hemodynamically stable  Respiratory status: acceptable  Hydration status: stable  Multimodal analgesia pain management approach  Pain management: adequate    No notable events documented.

## 2024-04-12 NOTE — BRIEF OP NOTE
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 Item Serial No.  Lot No. LRB No. Used Action   SCREW BNE L18MM TIP DIA3.5MM HEX 2MM MINI HND FT FULL THRD - SN/A  SCREW BNE L18MM TIP DIA3.5MM HEX 2MM MINI HND FT FULL THRD N/A ACUMED [facility] [id number] Right 1 Implanted         Drains: * No LDAs found *    Findings:  Infection Present At Time Of Surgery (PATOS) (choose all levels that have infection present):  No infection present  Other Findings: As above.     Electronically signed by Sara Ghosh PA-C on 4/12/2024 at 2:41 PM
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
## 2024-04-12 NOTE — ANESTHESIA PRE PROCEDURE
Department of Anesthesiology  Preprocedure Note       Name:  Araceli Segundo   Age:  63 y.o.  :  1960                                          MRN:  [id number]         Date:  2024      Surgeon: Surgeon(s):  Héctor Gloria MD    Procedure: Procedure(s):  RIGHT SCAPHOID NON UNION REPAIR WITH CANCELLOUS AUTOGRAFT (MAC WITH REGIONAL BLOCK)    Medications prior to admission:   Prior to Admission medications    Medication Sig Start Date End Date Taking? Authorizing Provider   oxyCODONE-acetaminophen (PERCOCET) 5-325 MG per tablet Take 1 tablet by mouth every 4 hours as needed for Pain for up to 7 days. Intended supply: 7 days. Take lowest dose possible to manage pain Max Daily Amount: 6 tablets 24 Yes Sara Ghosh PA-C   docusate sodium (COLACE) 100 MG capsule Take 1 capsule by mouth daily as needed for Constipation 24  Yes Sara Ghosh PA-C   ondansetron (ZOFRAN) 4 MG tablet Take 1 tablet by mouth 3 times daily as needed for Nausea or Vomiting 24  Yes Sara Ghosh PA-C   azithromycin (ZITHROMAX) 500 MG tablet Take 1 tablet by mouth three times a week    Robles Ramos MD   vitamin D (CHOLECALCIFEROL) 50 MCG ( UT) TABS tablet Take 1 tablet by mouth daily    Robles Ramos MD   amLODIPine-benazepril (LOTREL) 10-20 MG per capsule Take by mouth daily 10/30/23   Robles Ramos MD   fluticasone-umeclidin-vilant (TRELEGY ELLIPTA) 100-62.5-25 MCG/ACT AEPB inhaler TAKE 1 PUFF BY MOUTH EVERY DAY 20   Robles Ramos MD   omeprazole (PRILOSEC) 40 MG delayed release capsule daily 20   Robles Ramos MD   albuterol sulfate HFA (PROVENTIL;VENTOLIN;PROAIR) 108 (90 Base) MCG/ACT inhaler Inhale 2 puffs into the lungs every 4 hours as needed 17   Automatic Reconciliation, Ar   ipratropium-albuterol (DUONEB) 0.5-2.5 (3) MG/3ML SOLN nebulizer solution Inhale 3 mLs into the lungs as needed 17   Automatic Reconciliation,

## 2024-04-12 NOTE — ANESTHESIA PROCEDURE NOTES
Peripheral Block    Patient location during procedure: pre-op  Reason for block: procedure for pain, post-op pain management, primary anesthetic and at surgeon's request  Start time: 4/12/2024 11:50 AM  End time: 4/12/2024 11:57 AM  Staffing  Performed: anesthesiologist   Performed by: Mina Fraser MD  Authorized by: Mina Fraser MD    Preanesthetic Checklist  Completed: patient identified, IV checked, site marked, risks and benefits discussed, surgical/procedural consents, equipment checked, pre-op evaluation, timeout performed, anesthesia consent given, oxygen available, monitors applied/VS acknowledged, fire risk safety assessment completed and verbalized and blood product R/B/A discussed and consented  Peripheral Block   Patient position: supine  Prep: ChloraPrep  Provider prep: mask and sterile gloves  Patient monitoring: cardiac monitor, continuous pulse ox, frequent blood pressure checks, IV access, oxygen and responsive to questions  Block type: Axillary  R axillary  Laterality: right  Injection technique: single-shot  Guidance: ultrasound guided  Local infiltration: ropivacaine  Local infiltration: ropivacaine    Needle   Needle type: insulated echogenic nerve stimulator needle   Needle gauge: 20 G  Needle localization: ultrasound guidance  Assessment   Injection assessment: negative aspiration for heme, no paresthesia on injection, local visualized surrounding nerve on ultrasound and no intravascular symptoms  Paresthesia pain: none  Slow fractionated injection: yes  Hemodynamics: stable  Outcomes: uncomplicated    Medications Administered  ropivacaine (NAROPIN) injection 0.5% - Perineural   20 mL - 4/12/2024 11:55:00 AM
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
## 2024-05-23 ENCOUNTER — APPOINTMENT (OUTPATIENT)
Facility: HOSPITAL | Age: 64
End: 2024-05-23
Payer: MEDICARE

## 2024-05-23 ENCOUNTER — HOSPITAL ENCOUNTER (EMERGENCY)
Facility: HOSPITAL | Age: 64
Discharge: HOME | End: 2024-05-23
Attending: EMERGENCY MEDICINE
Payer: MEDICARE

## 2024-05-23 VITALS
BODY MASS INDEX: 31.47 KG/M2 | HEART RATE: 96 BPM | TEMPERATURE: 98.2 F | RESPIRATION RATE: 18 BRPM | HEIGHT: 62 IN | OXYGEN SATURATION: 95 % | WEIGHT: 171 LBS | SYSTOLIC BLOOD PRESSURE: 139 MMHG | DIASTOLIC BLOOD PRESSURE: 88 MMHG

## 2024-05-23 DIAGNOSIS — M79.89 LEG SWELLING: Primary | ICD-10-CM

## 2024-05-23 LAB — ECHO BSA: 1.84 M2

## 2024-05-23 PROCEDURE — 99282 EMERGENCY DEPT VISIT SF MDM: CPT

## 2024-05-23 PROCEDURE — 93971 EXTREMITY STUDY: CPT

## 2024-05-23 ASSESSMENT — PAIN - FUNCTIONAL ASSESSMENT: PAIN_FUNCTIONAL_ASSESSMENT: 0-10

## 2024-05-23 ASSESSMENT — PAIN DESCRIPTION - LOCATION: LOCATION: LEG

## 2024-05-23 ASSESSMENT — PAIN DESCRIPTION - DESCRIPTORS: DESCRIPTORS: ACHING

## 2024-05-23 ASSESSMENT — PAIN SCALES - GENERAL: PAINLEVEL_OUTOF10: 6

## 2024-05-23 NOTE — ED TRIAGE NOTES
PT ambulatory to ED with complaints of L leg and foot are swollen that just started. PT had surgery on R arm on April 12th. PT was unable to get in contact with her

## 2024-05-28 ENCOUNTER — OFFICE VISIT (OUTPATIENT)
Age: 64
End: 2024-05-28
Payer: MEDICARE

## 2024-05-28 VITALS
SYSTOLIC BLOOD PRESSURE: 124 MMHG | DIASTOLIC BLOOD PRESSURE: 84 MMHG | OXYGEN SATURATION: 93 % | BODY MASS INDEX: 31.61 KG/M2 | TEMPERATURE: 98.9 F | RESPIRATION RATE: 16 BRPM | WEIGHT: 172.8 LBS | HEART RATE: 95 BPM

## 2024-05-28 DIAGNOSIS — M79.89 LEFT LEG SWELLING: Primary | ICD-10-CM

## 2024-05-28 DIAGNOSIS — R42 DIZZINESS: ICD-10-CM

## 2024-05-28 PROCEDURE — 1090F PRES/ABSN URINE INCON ASSESS: CPT | Performed by: STUDENT IN AN ORGANIZED HEALTH CARE EDUCATION/TRAINING PROGRAM

## 2024-05-28 PROCEDURE — 1123F ACP DISCUSS/DSCN MKR DOCD: CPT | Performed by: STUDENT IN AN ORGANIZED HEALTH CARE EDUCATION/TRAINING PROGRAM

## 2024-05-28 PROCEDURE — 99213 OFFICE O/P EST LOW 20 MIN: CPT | Performed by: STUDENT IN AN ORGANIZED HEALTH CARE EDUCATION/TRAINING PROGRAM

## 2024-05-28 PROCEDURE — G8417 CALC BMI ABV UP PARAM F/U: HCPCS | Performed by: STUDENT IN AN ORGANIZED HEALTH CARE EDUCATION/TRAINING PROGRAM

## 2024-05-28 PROCEDURE — G8428 CUR MEDS NOT DOCUMENT: HCPCS | Performed by: STUDENT IN AN ORGANIZED HEALTH CARE EDUCATION/TRAINING PROGRAM

## 2024-05-28 PROCEDURE — G8400 PT W/DXA NO RESULTS DOC: HCPCS | Performed by: STUDENT IN AN ORGANIZED HEALTH CARE EDUCATION/TRAINING PROGRAM

## 2024-05-28 PROCEDURE — 3017F COLORECTAL CA SCREEN DOC REV: CPT | Performed by: STUDENT IN AN ORGANIZED HEALTH CARE EDUCATION/TRAINING PROGRAM

## 2024-05-28 PROCEDURE — 4004F PT TOBACCO SCREEN RCVD TLK: CPT | Performed by: STUDENT IN AN ORGANIZED HEALTH CARE EDUCATION/TRAINING PROGRAM

## 2024-05-28 ASSESSMENT — PATIENT HEALTH QUESTIONNAIRE - PHQ9
SUM OF ALL RESPONSES TO PHQ QUESTIONS 1-9: 3
SUM OF ALL RESPONSES TO PHQ9 QUESTIONS 1 & 2: 3
2. FEELING DOWN, DEPRESSED OR HOPELESS: MORE THAN HALF THE DAYS
1. LITTLE INTEREST OR PLEASURE IN DOING THINGS: SEVERAL DAYS

## 2024-05-28 NOTE — PROGRESS NOTES
Room 23    Patient is accompanied by self.     I have received verbal consent from Araceli Segundo to discuss any/all medical information while they are present in the room.    Identified pt with two pt identifiers(name and ). Reviewed record in preparation for visit and have obtained necessary documentation.  Chief Complaint   Patient presents with    Foot Swelling        Health Maintenance Due   Topic    Pneumococcal 65+ years Vaccine (1 of 2 - PCV)    HIV screen     Hepatitis C screen     Breast cancer screen     Shingles vaccine (1 of 2)    DEXA (modify frequency per FRAX score)     Respiratory Syncytial Virus (RSV) Pregnant or age 60 yrs+ (1 - 1-dose 60+ series)    Colorectal Cancer Screen     DTaP/Tdap/Td vaccine (2 - Td or Tdap)    COVID-19 Vaccine (3 -  season)    Annual Wellness Visit (Medicare Advantage)        Vitals:    24 1341   BP: 127/82   Site: Left Upper Arm   Position: Sitting   Cuff Size: Medium Adult   Pulse: 95   Resp: 16   Temp: 98.9 °F (37.2 °C)   TempSrc: Oral   SpO2: 93%   Weight: 78.4 kg (172 lb 12.8 oz)       Depression Screen:           2024     1:53 PM   PHQ-9    Little interest or pleasure in doing things 1   Feeling down, depressed, or hopeless 2   PHQ-2 Score 3   PHQ-9 Total Score 3       Social Determinants Of Health:       SDOH screening not required at visit.  .   See AVS for attached resources, if requested.    Coordination of Care Questionnaire:       \"Have you been to the ER, urgent care clinic since your last visit?  Hospitalized since your last visit?\"    YES - When: approximately 24 ago.  Where and Why: Leg Swelling.    “Have you seen or consulted any other health care providers outside of [facility] since your last visit?”    NO    Have you had a mammogram?”   NO    No breast cancer screening on file         “Have you had a colorectal cancer screening such as a colonoscopy/FIT/Cologuard?    NO    Date of last Colonoscopy:
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
## 2024-06-04 LAB — ECHO BSA: 1.84 M2

## 2024-06-07 ENCOUNTER — TELEPHONE (OUTPATIENT)
Age: 64
End: 2024-06-07

## 2024-06-07 NOTE — TELEPHONE ENCOUNTER
Left message for patient to schedule mammogram ordered by Ana Phelan DO on 11/8/23.  Requested patient to return call to panel manager at [phone] to schedule mammogram or notify that mammogram   has been completed at an outside facility.

## 2024-07-03 ENCOUNTER — OFFICE VISIT (OUTPATIENT)
Age: 64
End: 2024-07-03
Payer: MEDICARE

## 2024-07-03 ENCOUNTER — ANCILLARY PROCEDURE (OUTPATIENT)
Age: 64
End: 2024-07-03
Payer: MEDICARE

## 2024-07-03 VITALS
TEMPERATURE: 97.5 F | HEART RATE: 90 BPM | WEIGHT: 175 LBS | OXYGEN SATURATION: 95 % | BODY MASS INDEX: 32.2 KG/M2 | SYSTOLIC BLOOD PRESSURE: 126 MMHG | RESPIRATION RATE: 18 BRPM | HEIGHT: 62 IN | DIASTOLIC BLOOD PRESSURE: 75 MMHG

## 2024-07-03 DIAGNOSIS — M79.605 BILATERAL LEG PAIN: Primary | ICD-10-CM

## 2024-07-03 DIAGNOSIS — M25.551 BILATERAL HIP PAIN: ICD-10-CM

## 2024-07-03 DIAGNOSIS — M79.2 NEUROPATHIC PAIN: ICD-10-CM

## 2024-07-03 DIAGNOSIS — M79.604 BILATERAL LEG PAIN: ICD-10-CM

## 2024-07-03 DIAGNOSIS — M79.604 BILATERAL LEG PAIN: Primary | ICD-10-CM

## 2024-07-03 DIAGNOSIS — J44.9 CHRONIC OBSTRUCTIVE PULMONARY DISEASE, UNSPECIFIED COPD TYPE (HCC): ICD-10-CM

## 2024-07-03 DIAGNOSIS — F32.A ANXIETY AND DEPRESSION: ICD-10-CM

## 2024-07-03 DIAGNOSIS — M25.552 BILATERAL HIP PAIN: ICD-10-CM

## 2024-07-03 DIAGNOSIS — M79.605 BILATERAL LEG PAIN: ICD-10-CM

## 2024-07-03 DIAGNOSIS — F41.9 ANXIETY AND DEPRESSION: ICD-10-CM

## 2024-07-03 PROCEDURE — G8427 DOCREV CUR MEDS BY ELIG CLIN: HCPCS | Performed by: FAMILY MEDICINE

## 2024-07-03 PROCEDURE — 3017F COLORECTAL CA SCREEN DOC REV: CPT | Performed by: FAMILY MEDICINE

## 2024-07-03 PROCEDURE — 99214 OFFICE O/P EST MOD 30 MIN: CPT | Performed by: FAMILY MEDICINE

## 2024-07-03 PROCEDURE — 73521 X-RAY EXAM HIPS BI 2 VIEWS: CPT

## 2024-07-03 PROCEDURE — 1090F PRES/ABSN URINE INCON ASSESS: CPT | Performed by: FAMILY MEDICINE

## 2024-07-03 PROCEDURE — G8417 CALC BMI ABV UP PARAM F/U: HCPCS | Performed by: FAMILY MEDICINE

## 2024-07-03 PROCEDURE — 3023F SPIROM DOC REV: CPT | Performed by: FAMILY MEDICINE

## 2024-07-03 PROCEDURE — G8400 PT W/DXA NO RESULTS DOC: HCPCS | Performed by: FAMILY MEDICINE

## 2024-07-03 PROCEDURE — 72100 X-RAY EXAM L-S SPINE 2/3 VWS: CPT

## 2024-07-03 PROCEDURE — 1123F ACP DISCUSS/DSCN MKR DOCD: CPT | Performed by: FAMILY MEDICINE

## 2024-07-03 PROCEDURE — 4004F PT TOBACCO SCREEN RCVD TLK: CPT | Performed by: FAMILY MEDICINE

## 2024-07-03 RX ORDER — OXYCODONE HYDROCHLORIDE 5 MG/1
5 TABLET ORAL EVERY 6 HOURS PRN
Qty: 20 TABLET | Refills: 0 | Status: SHIPPED | OUTPATIENT
Start: 2024-07-03 | End: 2024-07-08

## 2024-07-03 RX ORDER — DULOXETIN HYDROCHLORIDE 30 MG/1
30 CAPSULE, DELAYED RELEASE ORAL DAILY
Qty: 30 CAPSULE | Refills: 1 | Status: SHIPPED | OUTPATIENT
Start: 2024-07-03
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
## 2024-07-06 PROBLEM — Z86.010 HISTORY OF COLON POLYPS: Status: ACTIVE | Noted: 2024-06-21

## 2024-07-06 PROBLEM — E87.6 HYPOKALEMIA: Status: RESOLVED | Noted: 2018-02-19 | Resolved: 2024-07-06

## 2024-07-06 PROBLEM — Z86.0100 HISTORY OF COLON POLYPS: Status: ACTIVE | Noted: 2024-06-21

## 2024-07-25 DIAGNOSIS — M79.2 NEUROPATHIC PAIN: ICD-10-CM

## 2024-07-25 RX ORDER — DULOXETIN HYDROCHLORIDE 30 MG/1
CAPSULE, DELAYED RELEASE ORAL DAILY
Qty: 90 CAPSULE | Refills: 0 | Status: SHIPPED | OUTPATIENT
Start: 2024-07-25

## 2024-08-14 ENCOUNTER — OFFICE VISIT (OUTPATIENT)
Age: 64
End: 2024-08-14
Payer: MEDICARE

## 2024-08-14 VITALS
WEIGHT: 171 LBS | HEART RATE: 105 BPM | DIASTOLIC BLOOD PRESSURE: 79 MMHG | HEIGHT: 62 IN | SYSTOLIC BLOOD PRESSURE: 113 MMHG | RESPIRATION RATE: 18 BRPM | OXYGEN SATURATION: 98 % | TEMPERATURE: 98.4 F | BODY MASS INDEX: 31.47 KG/M2

## 2024-08-14 DIAGNOSIS — M16.10 HIP ARTHRITIS: Primary | ICD-10-CM

## 2024-08-14 DIAGNOSIS — Z12.11 SCREENING FOR COLON CANCER: ICD-10-CM

## 2024-08-14 DIAGNOSIS — L24.9 IRRITANT DERMATITIS: ICD-10-CM

## 2024-08-14 PROCEDURE — 3017F COLORECTAL CA SCREEN DOC REV: CPT | Performed by: FAMILY MEDICINE

## 2024-08-14 PROCEDURE — G8417 CALC BMI ABV UP PARAM F/U: HCPCS | Performed by: FAMILY MEDICINE

## 2024-08-14 PROCEDURE — 1090F PRES/ABSN URINE INCON ASSESS: CPT | Performed by: FAMILY MEDICINE

## 2024-08-14 PROCEDURE — 99214 OFFICE O/P EST MOD 30 MIN: CPT | Performed by: FAMILY MEDICINE

## 2024-08-14 PROCEDURE — 4004F PT TOBACCO SCREEN RCVD TLK: CPT | Performed by: FAMILY MEDICINE

## 2024-08-14 PROCEDURE — 3078F DIAST BP <80 MM HG: CPT | Performed by: FAMILY MEDICINE

## 2024-08-14 PROCEDURE — G8427 DOCREV CUR MEDS BY ELIG CLIN: HCPCS | Performed by: FAMILY MEDICINE

## 2024-08-14 PROCEDURE — 3074F SYST BP LT 130 MM HG: CPT | Performed by: FAMILY MEDICINE

## 2024-08-14 PROCEDURE — G8400 PT W/DXA NO RESULTS DOC: HCPCS | Performed by: FAMILY MEDICINE

## 2024-08-14 PROCEDURE — 1123F ACP DISCUSS/DSCN MKR DOCD: CPT | Performed by: FAMILY MEDICINE

## 2024-08-14 RX ORDER — OXYCODONE HYDROCHLORIDE 5 MG/1
5 TABLET ORAL EVERY 6 HOURS PRN
Qty: 20 TABLET | Refills: 0 | Status: SHIPPED | OUTPATIENT
Start: 2024-08-14 | End: 2024-08-19

## 2024-08-14 RX ORDER — ACETAMINOPHEN 500 MG
1000 TABLET ORAL 2 TIMES DAILY PRN
Qty: 120 TABLET | Refills: 0
Start: 2024-08-14

## 2024-08-14 RX ORDER — TRIAMCINOLONE ACETONIDE 1 MG/G
OINTMENT TOPICAL
Qty: 30 G | Refills: 1 | Status: SHIPPED | OUTPATIENT
Start: 2024-08-14 | End: 2024-08-21

## 2024-08-14 NOTE — PROGRESS NOTES
Araceli Segundo is a 63 y.o. female      Chief Complaint   Patient presents with    Leg Pain     Patient is coming in for a follow up on bilateral leg pain. Patient states cymbalta has not helped. No other concerns.        \"Have you been to the ER, urgent care clinic since your last visit?  Hospitalized since your last visit?\"    NO    “Have you seen or consulted any other health care providers outside of [facility] since your last visit?”    NO    “Have you had a colorectal cancer screening such as a colonoscopy/FIT/Cologuard?    NO    Date of last Colonoscopy: 6/17/2015  No cologuard on file  No FIT/FOBT on file   No flexible sigmoidoscopy on file        Have you had a mammogram?”   NO    No breast cancer screening on file            Vitals:    08/14/24 0839   BP: 113/79   Site: Right Upper Arm   Position: Sitting   Pulse: (!) 105   Resp: 18   Temp: 98.4 °F (36.9 °C)   TempSrc: Oral   SpO2: 98%   Weight: 77.6 kg (171 lb)   Height: 1.575 m (5' 2\")            Health Maintenance Due   Topic Date Due    Pneumococcal 65+ years Vaccine (1 of 2 - PCV) Never done    HIV screen  Never done    Hepatitis C screen  Never done    Breast cancer screen  Never done    Shingles vaccine (1 of 2) Never done    DEXA (modify frequency per FRAX score)  Never done    Respiratory Syncytial Virus (RSV) Pregnant or age 60 yrs+ (1 - 1-dose 60+ series) Never done    Colorectal Cancer Screen  06/17/2020    DTaP/Tdap/Td vaccine (2 - Td or Tdap) 03/16/2021    COVID-19 Vaccine (3 - 2023-24 season) 09/01/2023    Annual Wellness Visit (Medicare Advantage)  01/01/2024    Flu vaccine (1) Never done         Medication Reconciliation completed, changes noted.  Please  Update medication list.

## 2024-08-14 NOTE — PROGRESS NOTES
[facility] Office Visit     Assessment/ Plan: Araceli Segundo is a 63 y.o. female presenting for:    Leg/Hip Pain - Acute on chronic. Reviewed XR from prior visit showing degenerative changes to left hip as well as DDD L5-S1. Trialed cymbalta and PT in the past, no improvement. Greatly limits mobility and ability to care for grandchildren. Worried about chronic NSAID use.   -- trial of scheduled Tylenol, short refill of prn oxycodone  -- referral to orthopedics to discuss options    Irritant Dermatitis - Chronic issue, no concerns at present but requesting refill for topical steroid. Recommended use of thick cream on hands.     COPD - Follows with pulmonology, requested records from Dr. Coleman.     Healthcare Maintenance  -- counseled on returning within 3-months for AWV/physical, labs   -- counseled on vaccines  -- encouraged colonoscopy for colon cancer screening - referral placed  -- encouraged to schedule mammogram, DEXA    30 minutes were spent on the day of this encounter both with the patient and in related activities including chart review, care coordination and counseling.     Patient instructions were discussed and/or provided in the AVS. The patient understands and agrees to the plan.    RETURN TO CARE: 3 months for physical, AWV  No future appointments.      Subjective:  Chief Complaint   Patient presents with    Leg Pain     Patient is coming in for a follow up on bilateral leg pain. Patient states cymbalta has not helped. No other concerns.      HPI:   Araceli Segundo is a 63 y.o. female with PMH of hiatal hernia, COPD, reflux, IIH, vitamin D deficiency, pre-diabetes, tobacco use, chronic pain, HLD, anxiety and depression here for follow-up leg/hip pain.     Leg/Hip Pain  - XR completed about a month ago   - trial of cymbalta and PT  - wants to be able to walk, hard to bend to get into car - mostly in groin   - doesn't want to do surgery or shots - okay to go to physical  therapy but $25 a visit   - worried about Advil  has used oxycodone occasionally refill     HCM  - colon cancer screen  - AWV  - prevnar, Tdap, Shingles, RSV, flu   - DEXA/mammo/LDCT - ordered in 2023    I have reviewed the patients problem list, current medications, allergies, family, medical and social history. I have updated them as needed.     Review of Systems  See HPI.    Objective:  /79 (Site: Right Upper Arm, Position: Sitting)   Pulse (!) 105   Temp 98.4 °F (36.9 °C) (Oral)   Resp 18   Ht 1.575 m (5' 2\")   Wt 77.6 kg (171 lb)   SpO2 98%   BMI 31.28 kg/m²   Physical Exam  Vitals and nursing note reviewed.   Constitutional:       General: She is not in acute distress.     Appearance: Normal appearance.   HENT:      Head: Normocephalic and atraumatic.      Right Ear: External ear normal.      Left Ear: External ear normal.      Mouth/Throat:      Mouth: Mucous membranes are moist.   Eyes:      Extraocular Movements: Extraocular movements intact.      Conjunctiva/sclera: Conjunctivae normal.   Cardiovascular:      Rate and Rhythm: Normal rate and regular rhythm.      Heart sounds: No murmur heard.  Pulmonary:      Effort: Pulmonary effort is normal. No respiratory distress.      Breath sounds: Normal breath sounds.   Musculoskeletal:      Cervical back: Normal range of motion and neck supple. No tenderness.      Right lower leg: No edema.      Left lower leg: No edema.   Skin:     General: Skin is warm and dry.   Neurological:      General: No focal deficit present.      Mental Status: She is alert.   Psychiatric:         Mood and Affect: Mood normal.         Behavior: Behavior normal.         Thought Content: Thought content normal.       Ana Phelan DO, [facility]

## 2024-08-25 ENCOUNTER — APPOINTMENT (OUTPATIENT)
Facility: HOSPITAL | Age: 64
DRG: 190 | End: 2024-08-25
Payer: MEDICARE

## 2024-08-25 ENCOUNTER — HOSPITAL ENCOUNTER (INPATIENT)
Facility: HOSPITAL | Age: 64
LOS: 4 days | Discharge: HOME HEALTH | DRG: 190 | End: 2024-08-29
Attending: EMERGENCY MEDICINE | Admitting: FAMILY MEDICINE
Payer: MEDICARE

## 2024-08-25 DIAGNOSIS — J44.1 COPD EXACERBATION (HCC): Primary | ICD-10-CM

## 2024-08-25 DIAGNOSIS — Z72.0 TOBACCO ABUSE: ICD-10-CM

## 2024-08-25 LAB
ALBUMIN SERPL-MCNC: 4 G/DL (ref 3.5–5.2)
ALBUMIN/GLOB SERPL: 1.4 (ref 1.1–2.2)
ALP SERPL-CCNC: 131 U/L (ref 35–104)
ALT SERPL-CCNC: 19 U/L (ref 10–35)
ANION GAP SERPL CALC-SCNC: 14 MMOL/L (ref 5–15)
AST SERPL-CCNC: 24 U/L (ref 10–35)
BASOPHILS # BLD: 0 K/UL (ref 0–0.1)
BASOPHILS NFR BLD: 0 % (ref 0–1)
BILIRUB SERPL-MCNC: 0.3 MG/DL (ref 0.2–1)
BUN SERPL-MCNC: 21 MG/DL (ref 8–23)
BUN/CREAT SERPL: 28 (ref 12–20)
CALCIUM SERPL-MCNC: 9.6 MG/DL (ref 8.8–10.2)
CHLORIDE SERPL-SCNC: 101 MMOL/L (ref 98–107)
CO2 SERPL-SCNC: 28 MMOL/L (ref 22–29)
COMMENT:: NORMAL
CREAT SERPL-MCNC: 0.75 MG/DL (ref 0.5–0.9)
DIFFERENTIAL METHOD BLD: ABNORMAL
EOSINOPHIL # BLD: 0 K/UL (ref 0–0.4)
EOSINOPHIL NFR BLD: 0 % (ref 0–7)
ERYTHROCYTE [DISTWIDTH] IN BLOOD BY AUTOMATED COUNT: 13.3 % (ref 11.5–14.5)
FLUAV RNA SPEC QL NAA+PROBE: NOT DETECTED
FLUBV RNA SPEC QL NAA+PROBE: NOT DETECTED
GLOBULIN SER CALC-MCNC: 2.8 G/DL (ref 2–4)
GLUCOSE SERPL-MCNC: 118 MG/DL (ref 65–100)
HCT VFR BLD AUTO: 39.9 % (ref 35–47)
HGB BLD-MCNC: 13.1 G/DL (ref 11.5–16)
IMM GRANULOCYTES # BLD AUTO: 0 K/UL
IMM GRANULOCYTES NFR BLD AUTO: 0 %
LYMPHOCYTES # BLD: 2 K/UL (ref 0.8–3.5)
LYMPHOCYTES NFR BLD: 25 % (ref 12–49)
MAGNESIUM SERPL-MCNC: 1.8 MG/DL (ref 1.6–2.4)
MCH RBC QN AUTO: 28.2 PG (ref 26–34)
MCHC RBC AUTO-ENTMCNC: 32.8 G/DL (ref 30–36.5)
MCV RBC AUTO: 85.8 FL (ref 80–99)
MONOCYTES # BLD: 0.4 K/UL (ref 0–1)
MONOCYTES NFR BLD: 5 % (ref 5–13)
NEUTS SEG # BLD: 5.6 K/UL (ref 1.8–8)
NEUTS SEG NFR BLD: 70 % (ref 32–75)
NRBC # BLD: 0 K/UL (ref 0–0.01)
NRBC BLD-RTO: 0 PER 100 WBC
NT PRO BNP: 376 PG/ML
PLATELET # BLD AUTO: 318 K/UL (ref 150–400)
PMV BLD AUTO: 8.8 FL (ref 8.9–12.9)
POTASSIUM SERPL-SCNC: 3.3 MMOL/L (ref 3.5–5.1)
PROCALCITONIN SERPL-MCNC: <0.05 NG/ML
PROT SERPL-MCNC: 6.8 G/DL (ref 6.4–8.3)
RBC # BLD AUTO: 4.65 M/UL (ref 3.8–5.2)
RBC MORPH BLD: ABNORMAL
SARS-COV-2 RNA RESP QL NAA+PROBE: NOT DETECTED
SODIUM SERPL-SCNC: 143 MMOL/L (ref 136–145)
SOURCE: NORMAL
SPECIMEN HOLD: NORMAL
TROPONIN T SERPL HS-MCNC: 9.2 NG/L (ref 0–14)
TROPONIN T SERPL HS-MCNC: 9.8 NG/L (ref 0–14)
WBC # BLD AUTO: 8 K/UL (ref 3.6–11)
WBC MORPH BLD: ABNORMAL

## 2024-08-25 PROCEDURE — 2500000003 HC RX 250 WO HCPCS

## 2024-08-25 PROCEDURE — 83735 ASSAY OF MAGNESIUM: CPT

## 2024-08-25 PROCEDURE — 2580000003 HC RX 258

## 2024-08-25 PROCEDURE — 2060000000 HC ICU INTERMEDIATE R&B

## 2024-08-25 PROCEDURE — 6360000002 HC RX W HCPCS: Performed by: NURSE PRACTITIONER

## 2024-08-25 PROCEDURE — 87636 SARSCOV2 & INF A&B AMP PRB: CPT

## 2024-08-25 PROCEDURE — 85025 COMPLETE CBC W/AUTO DIFF WBC: CPT

## 2024-08-25 PROCEDURE — 6360000002 HC RX W HCPCS

## 2024-08-25 PROCEDURE — 94640 AIRWAY INHALATION TREATMENT: CPT

## 2024-08-25 PROCEDURE — 93005 ELECTROCARDIOGRAM TRACING: CPT | Performed by: NURSE PRACTITIONER

## 2024-08-25 PROCEDURE — 71045 X-RAY EXAM CHEST 1 VIEW: CPT

## 2024-08-25 PROCEDURE — 84484 ASSAY OF TROPONIN QUANT: CPT

## 2024-08-25 PROCEDURE — 80053 COMPREHEN METABOLIC PANEL: CPT

## 2024-08-25 PROCEDURE — 6370000000 HC RX 637 (ALT 250 FOR IP)

## 2024-08-25 PROCEDURE — 99285 EMERGENCY DEPT VISIT HI MDM: CPT

## 2024-08-25 PROCEDURE — 94762 N-INVAS EAR/PLS OXIMTRY CONT: CPT

## 2024-08-25 PROCEDURE — 2700000000 HC OXYGEN THERAPY PER DAY

## 2024-08-25 PROCEDURE — 6370000000 HC RX 637 (ALT 250 FOR IP): Performed by: NURSE PRACTITIONER

## 2024-08-25 PROCEDURE — 96374 THER/PROPH/DIAG INJ IV PUSH: CPT

## 2024-08-25 PROCEDURE — 84145 PROCALCITONIN (PCT): CPT

## 2024-08-25 PROCEDURE — 83880 ASSAY OF NATRIURETIC PEPTIDE: CPT

## 2024-08-25 PROCEDURE — 36415 COLL VENOUS BLD VENIPUNCTURE: CPT

## 2024-08-25 RX ORDER — SODIUM CHLORIDE 9 MG/ML
INJECTION, SOLUTION INTRAVENOUS PRN
Status: DISCONTINUED | OUTPATIENT
Start: 2024-08-25 | End: 2024-08-29 | Stop reason: HOSPADM

## 2024-08-25 RX ORDER — GUAIFENESIN 600 MG/1
600 TABLET, EXTENDED RELEASE ORAL ONCE
Status: COMPLETED | OUTPATIENT
Start: 2024-08-25 | End: 2024-08-25

## 2024-08-25 RX ORDER — AMLODIPINE AND BENAZEPRIL HYDROCHLORIDE 10; 20 MG/1; MG/1
1 CAPSULE ORAL DAILY
Status: DISCONTINUED | OUTPATIENT
Start: 2024-08-26 | End: 2024-08-25

## 2024-08-25 RX ORDER — BUDESONIDE 0.5 MG/2ML
0.5 INHALANT ORAL
Status: DISCONTINUED | OUTPATIENT
Start: 2024-08-25 | End: 2024-08-29 | Stop reason: HOSPADM

## 2024-08-25 RX ORDER — ARFORMOTEROL TARTRATE 15 UG/2ML
15 SOLUTION RESPIRATORY (INHALATION)
Status: DISCONTINUED | OUTPATIENT
Start: 2024-08-25 | End: 2024-08-29 | Stop reason: HOSPADM

## 2024-08-25 RX ORDER — MAGNESIUM SULFATE HEPTAHYDRATE 40 MG/ML
2000 INJECTION, SOLUTION INTRAVENOUS ONCE
Status: COMPLETED | OUTPATIENT
Start: 2024-08-25 | End: 2024-08-25

## 2024-08-25 RX ORDER — SODIUM CHLORIDE 0.9 % (FLUSH) 0.9 %
5-40 SYRINGE (ML) INJECTION PRN
Status: DISCONTINUED | OUTPATIENT
Start: 2024-08-25 | End: 2024-08-29 | Stop reason: HOSPADM

## 2024-08-25 RX ORDER — DEXAMETHASONE SODIUM PHOSPHATE 10 MG/ML
10 INJECTION INTRAMUSCULAR; INTRAVENOUS
Status: DISCONTINUED | OUTPATIENT
Start: 2024-08-25 | End: 2024-08-25

## 2024-08-25 RX ORDER — IPRATROPIUM BROMIDE AND ALBUTEROL SULFATE 2.5; .5 MG/3ML; MG/3ML
SOLUTION RESPIRATORY (INHALATION)
Status: COMPLETED
Start: 2024-08-25 | End: 2024-08-25

## 2024-08-25 RX ORDER — LISINOPRIL 20 MG/1
20 TABLET ORAL NIGHTLY
Status: DISCONTINUED | OUTPATIENT
Start: 2024-08-25 | End: 2024-08-29 | Stop reason: HOSPADM

## 2024-08-25 RX ORDER — SODIUM CHLORIDE 0.9 % (FLUSH) 0.9 %
5-40 SYRINGE (ML) INJECTION EVERY 12 HOURS SCHEDULED
Status: DISCONTINUED | OUTPATIENT
Start: 2024-08-25 | End: 2024-08-29 | Stop reason: HOSPADM

## 2024-08-25 RX ORDER — DEXAMETHASONE SODIUM PHOSPHATE 4 MG/ML
10 INJECTION, SOLUTION INTRA-ARTICULAR; INTRALESIONAL; INTRAMUSCULAR; INTRAVENOUS; SOFT TISSUE
Status: COMPLETED | OUTPATIENT
Start: 2024-08-25 | End: 2024-08-25

## 2024-08-25 RX ORDER — IPRATROPIUM BROMIDE AND ALBUTEROL SULFATE 2.5; .5 MG/3ML; MG/3ML
1 SOLUTION RESPIRATORY (INHALATION)
Status: DISCONTINUED | OUTPATIENT
Start: 2024-08-25 | End: 2024-08-26

## 2024-08-25 RX ORDER — ACETAMINOPHEN 650 MG/1
650 SUPPOSITORY RECTAL EVERY 6 HOURS PRN
Status: DISCONTINUED | OUTPATIENT
Start: 2024-08-25 | End: 2024-08-29 | Stop reason: HOSPADM

## 2024-08-25 RX ORDER — ONDANSETRON 4 MG/1
4 TABLET, ORALLY DISINTEGRATING ORAL EVERY 8 HOURS PRN
Status: DISCONTINUED | OUTPATIENT
Start: 2024-08-25 | End: 2024-08-29 | Stop reason: HOSPADM

## 2024-08-25 RX ORDER — POLYETHYLENE GLYCOL 3350 17 G/17G
17 POWDER, FOR SOLUTION ORAL DAILY PRN
Status: DISCONTINUED | OUTPATIENT
Start: 2024-08-25 | End: 2024-08-29 | Stop reason: HOSPADM

## 2024-08-25 RX ORDER — ACETAMINOPHEN 325 MG/1
650 TABLET ORAL EVERY 6 HOURS PRN
Status: DISCONTINUED | OUTPATIENT
Start: 2024-08-25 | End: 2024-08-29 | Stop reason: HOSPADM

## 2024-08-25 RX ORDER — ENOXAPARIN SODIUM 100 MG/ML
40 INJECTION SUBCUTANEOUS DAILY
Status: DISCONTINUED | OUTPATIENT
Start: 2024-08-25 | End: 2024-08-29 | Stop reason: HOSPADM

## 2024-08-25 RX ORDER — ONDANSETRON 2 MG/ML
4 INJECTION INTRAMUSCULAR; INTRAVENOUS EVERY 6 HOURS PRN
Status: DISCONTINUED | OUTPATIENT
Start: 2024-08-25 | End: 2024-08-29 | Stop reason: HOSPADM

## 2024-08-25 RX ORDER — AMLODIPINE BESYLATE 5 MG/1
10 TABLET ORAL NIGHTLY
Status: DISCONTINUED | OUTPATIENT
Start: 2024-08-25 | End: 2024-08-29 | Stop reason: HOSPADM

## 2024-08-25 RX ORDER — ALBUTEROL SULFATE 0.83 MG/ML
SOLUTION RESPIRATORY (INHALATION)
Status: COMPLETED
Start: 2024-08-25 | End: 2024-08-25

## 2024-08-25 RX ADMIN — GUAIFENESIN 600 MG: 600 TABLET ORAL at 19:25

## 2024-08-25 RX ADMIN — ALBUTEROL SULFATE 2.5 MG: 2.5 SOLUTION RESPIRATORY (INHALATION) at 18:06

## 2024-08-25 RX ADMIN — BUDESONIDE 500 MCG: 0.5 INHALANT RESPIRATORY (INHALATION) at 22:25

## 2024-08-25 RX ADMIN — DEXAMETHASONE SODIUM PHOSPHATE 10 MG: 4 INJECTION INTRA-ARTICULAR; INTRALESIONAL; INTRAMUSCULAR; INTRAVENOUS; SOFT TISSUE at 18:06

## 2024-08-25 RX ADMIN — POTASSIUM BICARBONATE 20 MEQ: 782 TABLET, EFFERVESCENT ORAL at 20:46

## 2024-08-25 RX ADMIN — MAGNESIUM SULFATE HEPTAHYDRATE 2000 MG: 40 INJECTION, SOLUTION INTRAVENOUS at 20:46

## 2024-08-25 RX ADMIN — IPRATROPIUM BROMIDE AND ALBUTEROL SULFATE 1 DOSE: 2.5; .5 SOLUTION RESPIRATORY (INHALATION) at 18:34

## 2024-08-25 RX ADMIN — ARFORMOTEROL TARTRATE 15 MCG: 15 SOLUTION RESPIRATORY (INHALATION) at 22:25

## 2024-08-25 RX ADMIN — IPRATROPIUM BROMIDE AND ALBUTEROL SULFATE 3 ML: 2.5; .5 SOLUTION RESPIRATORY (INHALATION) at 18:06

## 2024-08-25 RX ADMIN — SODIUM CHLORIDE, PRESERVATIVE FREE 10 ML: 5 INJECTION INTRAVENOUS at 21:01

## 2024-08-25 RX ADMIN — IPRATROPIUM BROMIDE AND ALBUTEROL SULFATE 1 DOSE: 2.5; .5 SOLUTION RESPIRATORY (INHALATION) at 21:00

## 2024-08-25 ASSESSMENT — PAIN - FUNCTIONAL ASSESSMENT: PAIN_FUNCTIONAL_ASSESSMENT: 0-10

## 2024-08-25 ASSESSMENT — LIFESTYLE VARIABLES
HOW OFTEN DO YOU HAVE A DRINK CONTAINING ALCOHOL: NEVER
HOW MANY STANDARD DRINKS CONTAINING ALCOHOL DO YOU HAVE ON A TYPICAL DAY: PATIENT DOES NOT DRINK

## 2024-08-25 ASSESSMENT — PAIN SCALES - GENERAL: PAINLEVEL_OUTOF10: 0
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
## 2024-08-25 NOTE — ED PROVIDER NOTES
[facility] EMERGENCY DEPT  EMERGENCY DEPARTMENT ENCOUNTER      Pt Name: Araceli Segundo  MRN: [id number]  Birthdate 1960  Date of evaluation: 8/25/2024  Provider: TORSTEN Andujar NP    CHIEF COMPLAINT       Chief Complaint   Patient presents with    Shortness of Breath         HISTORY OF PRESENT ILLNESS   (Location/Symptom, Timing/Onset, Context/Setting, Quality, Duration, Modifying Factors, Severity)  Note limiting factors.   The history is provided by the patient and the EMS personnel. No  was used.         Araceli Segundo is a 63 y.o. female with Hx of COPD, diverticulitis, anxiety and depression, ongoing tobacco abuse, GERD, hiatal hernia, dyslipidemia, idiopathic intracranial hypertension, prediabetes who presents via EMS to [place] ED with cc of SOB.     Patient reports worsening shortness of breath and productive cough with low oxygen saturations at home.  Is not normally on oxygen, room air sats are 86%.  Has used oxygen in the past with her history of COPD, but it has been months since she has done this.  EMS placed patient on 4 L nasal cannula and sats improved to 91%.  Patient reports that she has been using back-to-back nebulizer treatments at home with no relief.  Took a home COVID test which was negative.  Reports subjective fevers at the earlier part of the week with generalized fatigue.     Denies any chest pain, nausea, vomiting, diarrhea, urinary symptoms.  States that she has chronic lower extremity swelling which is unchanged.  Reports ongoing tobacco abuse, denies alcohol or other substance abuse.          PCP: Ana Phelan, DO    There are no other complaints, changes or physical findings at this time.      Review of External Medical Records:     Nursing Notes were reviewed.    REVIEW OF SYSTEMS    (2-9 systems for level 4, 10 or more for level 5)     Review of Systems   Constitutional:  Positive for fatigue and fever.   Respiratory:  Positive for cough,  tobacco abuse. Low suspicion for alternate etiologies such as pneumothorax, acute PE, pneumonia. Presentation not consistent with other acute cardiopulmonary causes including ACS, CHF. Patient given ipratropium, albuterol, Decadron here with improvement of symptoms.  Continues to have new oxygen requirement and is unable to go to the bathroom without significant tachypnea.  Chest x-ray is clear, labs are otherwise reassuring.  Will transfer for admission to [facility] under the care of family medicine.  Diagnostic findings were discussed with patient as well as recommendation for admission, patient was agreeable with this process.    Amount and/or Complexity of Data Reviewed  Labs: ordered.  Radiology: ordered.  ECG/medicine tests: ordered.    Risk  OTC drugs.  Prescription drug management.  Decision regarding hospitalization.            REASSESSMENT     ED Course as of 08/25/24 2145   Sun Aug 25, 2024   1801 EKG at 1756 read of the shows normal sinus rhythm 100 bpm no ST elevation [VM]      ED Course User Index  [VM] Eladio Huston MD           CONSULTS:  IP CONSULT TO FAMILY MEDICINE    PROCEDURES:  Unless otherwise noted below, none     Procedures      FINAL IMPRESSION      1. COPD exacerbation (HCC)    2. Tobacco abuse          DISPOSITION/PLAN   DISPOSITION Admitted 08/25/2024 07:32:05 PM      Perfect Serve Consult for Admission  9:45 PM    ED Room Number: SF[facility] - SHARE/NONE  Patient Name and age:  Araceli Segundo 63 y.o.  female  Working Diagnosis:   1. COPD exacerbation (HCC)    2. Tobacco abuse        COVID-19 Suspicion: No  Sepsis present:  No  Reassessment needed: No  Code Status:  Full Code  Readmission: No  Isolation Requirements: no  Recommended Level of Care: telemetry  Department: [place] [facility] - [phone]  Consulting Provider: Kieran DYER/ Mary VALENTINO     Other: 63-year-old female with PMH of hiatal hernia, COPD, reflux, IIH, vitamin D deficiency, pre-diabetes, tobacco use, chronic pain, HLD,

## 2024-08-25 NOTE — PROGRESS NOTES
[facility] Residency    Office [phone], Fax [phone]     Senior Resident Admission Note     CC:   Chief Complaint   Patient presents with    Shortness of Breath       HPI:  Araceli Segundo is a 63 y.o. female w/ a PMHX of HTN, HLD, COPD, Anxiety/depression, DDD lumbar who presents to the ER complaining with the above complaint.     Chart reviewed. Patient seen, examined, and discussed with Dr. Laguerre (PGY-1). See H&P for more details.    A/P: Admit to telemetry. Code status: FULL.    SOB, m/l COPD exacerbation: Likely 2/2 viral respiratory infection, less likely cardiac etiology with normal troponin and BNP at 371. With new O2 requirement and tachycardia, PE cannot be rule out completely without CTA chest. However, patient's tachycardia is improving with nebulizer treatment and after receiving decadron in the ED and patient presenting with wheezing and congestion makes COPDe more likely. Lung exam with expiratory wheezing and inspiratory rhonchi without crackles. PCL neg. CXR: NAP. Baseline O2 requirement: No O2 at home, but patient needed O2 in the past. Currently requiring 4L O2 NC. Home regimen includes trelegy QD, albuterol prn, azithromycin 500mg 3 times/week. S/p decadron 10mg IV, albuterol nebulizer in the ED. S/p prednisone 30mg 8/21- 22, then Prednisone 50mg 8/23-24, on Doxy 100mg BID since 8/21.   -Admit to telemetry with continuous cardiac monitoring  -Supplemental oxygen as needed to maintain O2 sats at 88-92% (currently on 4L NC)  -Daily BMP, CBC, Mg  -ABG pending.   -Will upgrade to ICU if pH <7.2  -Will start BiPAP if pH <7.35  -Sputum culture pending  -MRSA swab pending  -Duo-nebs q4h scheduled while awake  -Brovana-Pulmicort BID subst for trelegy  -IV Solumedrol 60mg IV BID (s/p prednisone 30mg 8/21- 22, then Prednisone 50mg 8/23-24)  -Begin IV Protonix 40mg daily for SUP  -Antibiotics: Continue doxycycline 100mg BID 8/21-27, for total

## 2024-08-25 NOTE — ED TRIAGE NOTES
Patient to ED via  from home c/o shortness of breath and low O2 sats at home. Hx of COPD, is not normally on oxygen. RA sats 86%, 4L NC 91%. Patient is tachycardic, wheezing with a strong cough. Denies fever,

## 2024-08-26 PROBLEM — Z72.0 TOBACCO ABUSE: Status: ACTIVE | Noted: 2023-12-31

## 2024-08-26 PROBLEM — I10 ESSENTIAL HYPERTENSION: Status: ACTIVE | Noted: 2024-08-26

## 2024-08-26 PROBLEM — J44.1 COPD EXACERBATION (HCC): Status: ACTIVE | Noted: 2017-08-20

## 2024-08-26 LAB
ANION GAP SERPL CALC-SCNC: 7 MMOL/L (ref 5–15)
BASOPHILS # BLD: 0 K/UL (ref 0–0.1)
BASOPHILS NFR BLD: 0 % (ref 0–1)
BUN SERPL-MCNC: 20 MG/DL (ref 6–20)
BUN/CREAT SERPL: 24 (ref 12–20)
CALCIUM SERPL-MCNC: 9.1 MG/DL (ref 8.5–10.1)
CHLORIDE SERPL-SCNC: 103 MMOL/L (ref 97–108)
CO2 SERPL-SCNC: 30 MMOL/L (ref 21–32)
CREAT SERPL-MCNC: 0.82 MG/DL (ref 0.55–1.02)
DIFFERENTIAL METHOD BLD: ABNORMAL
EKG ATRIAL RATE: 100 BPM
EKG DIAGNOSIS: NORMAL
EKG P AXIS: 78 DEGREES
EKG P-R INTERVAL: 168 MS
EKG Q-T INTERVAL: 338 MS
EKG QRS DURATION: 76 MS
EKG QTC CALCULATION (BAZETT): 436 MS
EKG R AXIS: 56 DEGREES
EKG T AXIS: 68 DEGREES
EKG VENTRICULAR RATE: 100 BPM
EOSINOPHIL # BLD: 0 K/UL (ref 0–0.4)
EOSINOPHIL NFR BLD: 0 % (ref 0–7)
ERYTHROCYTE [DISTWIDTH] IN BLOOD BY AUTOMATED COUNT: 13.2 % (ref 11.5–14.5)
GLUCOSE SERPL-MCNC: 172 MG/DL (ref 65–100)
HCT VFR BLD AUTO: 38.4 % (ref 35–47)
HGB BLD-MCNC: 12.4 G/DL (ref 11.5–16)
IMM GRANULOCYTES # BLD AUTO: 0.1 K/UL (ref 0–0.04)
IMM GRANULOCYTES NFR BLD AUTO: 2 % (ref 0–0.5)
LYMPHOCYTES # BLD: 0.9 K/UL (ref 0.8–3.5)
LYMPHOCYTES NFR BLD: 13 % (ref 12–49)
MAGNESIUM SERPL-MCNC: 2.5 MG/DL (ref 1.6–2.4)
MCH RBC QN AUTO: 27.9 PG (ref 26–34)
MCHC RBC AUTO-ENTMCNC: 32.3 G/DL (ref 30–36.5)
MCV RBC AUTO: 86.5 FL (ref 80–99)
MONOCYTES # BLD: 0.2 K/UL (ref 0–1)
MONOCYTES NFR BLD: 2 % (ref 5–13)
NEUTS SEG # BLD: 6.3 K/UL (ref 1.8–8)
NEUTS SEG NFR BLD: 83 % (ref 32–75)
NRBC # BLD: 0 K/UL (ref 0–0.01)
NRBC BLD-RTO: 0 PER 100 WBC
PLATELET # BLD AUTO: 278 K/UL (ref 150–400)
PMV BLD AUTO: 9 FL (ref 8.9–12.9)
POTASSIUM SERPL-SCNC: 3.6 MMOL/L (ref 3.5–5.1)
RBC # BLD AUTO: 4.44 M/UL (ref 3.8–5.2)
SODIUM SERPL-SCNC: 140 MMOL/L (ref 136–145)
WBC # BLD AUTO: 7.5 K/UL (ref 3.6–11)

## 2024-08-26 PROCEDURE — 2700000000 HC OXYGEN THERAPY PER DAY

## 2024-08-26 PROCEDURE — 99223 1ST HOSP IP/OBS HIGH 75: CPT | Performed by: FAMILY MEDICINE

## 2024-08-26 PROCEDURE — 6360000002 HC RX W HCPCS

## 2024-08-26 PROCEDURE — 94761 N-INVAS EAR/PLS OXIMETRY MLT: CPT

## 2024-08-26 PROCEDURE — 83735 ASSAY OF MAGNESIUM: CPT

## 2024-08-26 PROCEDURE — 6370000000 HC RX 637 (ALT 250 FOR IP)

## 2024-08-26 PROCEDURE — 6360000002 HC RX W HCPCS: Performed by: INTERNAL MEDICINE

## 2024-08-26 PROCEDURE — 6370000000 HC RX 637 (ALT 250 FOR IP): Performed by: INTERNAL MEDICINE

## 2024-08-26 PROCEDURE — 2580000003 HC RX 258

## 2024-08-26 PROCEDURE — 94640 AIRWAY INHALATION TREATMENT: CPT

## 2024-08-26 PROCEDURE — 80048 BASIC METABOLIC PNL TOTAL CA: CPT

## 2024-08-26 PROCEDURE — 93010 ELECTROCARDIOGRAM REPORT: CPT | Performed by: INTERNAL MEDICINE

## 2024-08-26 PROCEDURE — 2580000003 HC RX 258: Performed by: INTERNAL MEDICINE

## 2024-08-26 PROCEDURE — 94669 MECHANICAL CHEST WALL OSCILL: CPT

## 2024-08-26 PROCEDURE — 85025 COMPLETE CBC W/AUTO DIFF WBC: CPT

## 2024-08-26 PROCEDURE — 6370000000 HC RX 637 (ALT 250 FOR IP): Performed by: FAMILY MEDICINE

## 2024-08-26 PROCEDURE — 1100000000 HC RM PRIVATE

## 2024-08-26 RX ORDER — IPRATROPIUM BROMIDE AND ALBUTEROL SULFATE 2.5; .5 MG/3ML; MG/3ML
1 SOLUTION RESPIRATORY (INHALATION)
Status: DISCONTINUED | OUTPATIENT
Start: 2024-08-26 | End: 2024-08-27

## 2024-08-26 RX ORDER — DOXYCYCLINE HYCLATE 100 MG
100 TABLET ORAL EVERY 12 HOURS SCHEDULED
Status: DISCONTINUED | OUTPATIENT
Start: 2024-08-26 | End: 2024-08-27

## 2024-08-26 RX ORDER — IPRATROPIUM BROMIDE AND ALBUTEROL SULFATE 2.5; .5 MG/3ML; MG/3ML
1 SOLUTION RESPIRATORY (INHALATION)
Status: DISCONTINUED | OUTPATIENT
Start: 2024-08-26 | End: 2024-08-26

## 2024-08-26 RX ADMIN — METHYLPREDNISOLONE SODIUM SUCCINATE 60 MG: 125 INJECTION, POWDER, LYOPHILIZED, FOR SOLUTION INTRAMUSCULAR; INTRAVENOUS at 12:16

## 2024-08-26 RX ADMIN — SODIUM CHLORIDE, PRESERVATIVE FREE 10 ML: 5 INJECTION INTRAVENOUS at 22:45

## 2024-08-26 RX ADMIN — ENOXAPARIN SODIUM 40 MG: 100 INJECTION SUBCUTANEOUS at 00:21

## 2024-08-26 RX ADMIN — METHYLPREDNISOLONE SODIUM SUCCINATE 60 MG: 125 INJECTION, POWDER, LYOPHILIZED, FOR SOLUTION INTRAMUSCULAR; INTRAVENOUS at 17:41

## 2024-08-26 RX ADMIN — BUDESONIDE 500 MCG: 0.5 INHALANT RESPIRATORY (INHALATION) at 19:52

## 2024-08-26 RX ADMIN — ENOXAPARIN SODIUM 40 MG: 100 INJECTION SUBCUTANEOUS at 17:41

## 2024-08-26 RX ADMIN — DOXYCYCLINE HYCLATE 100 MG: 100 TABLET, COATED ORAL at 21:23

## 2024-08-26 RX ADMIN — PANTOPRAZOLE SODIUM 40 MG: 40 INJECTION, POWDER, FOR SOLUTION INTRAVENOUS at 09:02

## 2024-08-26 RX ADMIN — IPRATROPIUM BROMIDE AND ALBUTEROL SULFATE 1 DOSE: 2.5; .5 SOLUTION RESPIRATORY (INHALATION) at 07:08

## 2024-08-26 RX ADMIN — BUDESONIDE 500 MCG: 0.5 INHALANT RESPIRATORY (INHALATION) at 07:08

## 2024-08-26 RX ADMIN — IPRATROPIUM BROMIDE AND ALBUTEROL SULFATE 1 DOSE: .5; 3 SOLUTION RESPIRATORY (INHALATION) at 07:08

## 2024-08-26 RX ADMIN — METHYLPREDNISOLONE SODIUM SUCCINATE 60 MG: 125 INJECTION, POWDER, LYOPHILIZED, FOR SOLUTION INTRAMUSCULAR; INTRAVENOUS at 07:02

## 2024-08-26 RX ADMIN — LISINOPRIL 20 MG: 20 TABLET ORAL at 21:23

## 2024-08-26 RX ADMIN — AMLODIPINE BESYLATE 10 MG: 5 TABLET ORAL at 00:20

## 2024-08-26 RX ADMIN — IPRATROPIUM BROMIDE AND ALBUTEROL SULFATE 1 DOSE: 2.5; .5 SOLUTION RESPIRATORY (INHALATION) at 19:52

## 2024-08-26 RX ADMIN — AMLODIPINE BESYLATE 10 MG: 5 TABLET ORAL at 21:23

## 2024-08-26 RX ADMIN — LISINOPRIL 20 MG: 20 TABLET ORAL at 00:21

## 2024-08-26 RX ADMIN — DOXYCYCLINE HYCLATE 100 MG: 100 TABLET, COATED ORAL at 09:02

## 2024-08-26 RX ADMIN — ARFORMOTEROL TARTRATE 15 MCG: 15 SOLUTION RESPIRATORY (INHALATION) at 07:08

## 2024-08-26 RX ADMIN — ARFORMOTEROL TARTRATE 15 MCG: 15 SOLUTION RESPIRATORY (INHALATION) at 19:52

## 2024-08-26 ASSESSMENT — ENCOUNTER SYMPTOMS
CHEST TIGHTNESS: 1
CONSTIPATION: 0
VOMITING: 0
DIARRHEA: 0
COUGH: 1
SHORTNESS OF BREATH: 1
SORE THROAT: 1
NAUSEA: 0
WHEEZING: 1
ABDOMINAL PAIN: 0
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
## 2024-08-26 NOTE — PLAN OF CARE
Problem: Discharge Planning  Goal: Discharge to home or other facility with appropriate resources  Outcome: Progressing     Problem: Respiratory - Adult  Goal: Achieves optimal ventilation and oxygenation  8/26/2024 0559 by Woody Duran RN  Outcome: Progressing  8/25/2024 2243 by Zaid Moraes RCP  Outcome: Progressing     Problem: Pain  Goal: Verbalizes/displays adequate comfort level or baseline comfort level  Outcome: Progressing

## 2024-08-26 NOTE — ED NOTES
2 attempts made by two nurses to obtain ABG w/o success on L wrist. Charge informed and will inform Attending.

## 2024-08-26 NOTE — PLAN OF CARE
Problem: Discharge Planning  Goal: Discharge to home or other facility with appropriate resources  8/26/2024 1119 by Asuncion Montano RN  Outcome: Progressing  8/26/2024 0559 by Woody Duran RN  Outcome: Progressing     Problem: Respiratory - Adult  Goal: Achieves optimal ventilation and oxygenation  8/26/2024 1119 by Asuncion Montano RN  Outcome: Progressing  8/26/2024 0559 by Woody Duran RN  Outcome: Progressing  8/25/2024 2243 by Zaid Moraes RCP  Outcome: Progressing     Problem: Pain  Goal: Verbalizes/displays adequate comfort level or baseline comfort level  8/26/2024 1119 by Asuncion Montano RN  Outcome: Progressing  8/26/2024 0559 by Woody Duran RN  Outcome: Progressing     Problem: Safety - Adult  Goal: Free from fall injury  Outcome: Progressing

## 2024-08-26 NOTE — PLAN OF CARE
Problem: Discharge Planning  Goal: Discharge to home or other facility with appropriate resources  8/26/2024 1429 by Aureliano Covarrubias RN  Outcome: Progressing  8/26/2024 1119 by Asuncion Montano RN  Outcome: Progressing  8/26/2024 0559 by Woody Duran RN  Outcome: Progressing     Problem: Respiratory - Adult  Goal: Achieves optimal ventilation and oxygenation  8/26/2024 1429 by Aureliano Covarrubias RN  Outcome: Progressing  8/26/2024 1119 by Asuncion Montano RN  Outcome: Progressing  8/26/2024 0559 by Woody Duran RN  Outcome: Progressing     Problem: Pain  Goal: Verbalizes/displays adequate comfort level or baseline comfort level  8/26/2024 1429 by Aureliano Covarrubias RN  Outcome: Progressing  8/26/2024 1119 by Asuncion Montano RN  Outcome: Progressing  8/26/2024 0559 by Woody Duran RN  Outcome: Progressing     Problem: Safety - Adult  Goal: Free from fall injury  8/26/2024 1429 by Aureliano Covarrubias RN  Outcome: Progressing  8/26/2024 1119 by Asuncion Montano RN  Outcome: Progressing

## 2024-08-26 NOTE — ED NOTES
TRANSFER - OUT REPORT:    Verbal report given to DUNIA Hudson on Araceli Segundo  being transferred to [facility] 3rd Floor for routine progression of patient care       Report consisted of patient's Situation, Background, Assessment and   Recommendations(SBAR).     Information from the following report(s) Nurse Handoff Report, ED Encounter Summary, ED SBAR, MAR, Recent Results, Med Rec Status, Cardiac Rhythm SR w/ PACs, Neuro Assessment, and Event Log was reviewed with the receiving nurse.    [place] Fall Assessment:    Presents to emergency department  because of falls (Syncope, seizure, or loss of consciousness): No  Age > 70: No  Altered Mental Status, Intoxication with alcohol or substance confusion (Disorientation, impaired judgment, poor safety awaremess, or inability to follow instructions): No  Impaired Mobility: Ambulates or transfers with assistive devices or assistance; Unable to ambulate or transer.: No  Nursing Judgement: No          Lines:   Peripheral IV 08/25/24 Right Antecubital (Active)        Opportunity for questions and clarification was provided.      Patient transported with:  Monitor, O2 @ 3lpm, and Patient-specific medications from Pharmacy

## 2024-08-26 NOTE — PROGRESS NOTES
Subjective / Objective     Subjective  Overnight Events: NAEO.     Patient seen and examined at bedside. Breathing is slightly better this AM. No new complaints.     Respiratory:     Vitals:    08/26/24 0808   BP: 114/89   Pulse: 85   Resp: 24   Temp: 97.9 °F (36.6 °C)   SpO2: 92%     Physical Examination:   General appearance - alert, NAD. NC in place.  Chest - Diminished lung sounds, exp wheezing through all lung fields   Heart - normal rate, regular rhythm, normal S1, S2, no murmurs, rubs, clicks or gallops,   Abdomen - soft, nontender, nondistended, no masses or organomegaly  Neurological - alert, oriented, normal speech, no focal findings  Skin - warm, dry. No notable rashes  Extremities - No pedal edema, no clubbing or cyanosis  Psychiatric - normal speech and thought processes    I/O:  No intake/output data recorded.    Inpatient Medications   Current Facility-Administered Medications   Medication Dose Route Frequency Provider Last Rate Last Admin    doxycycline hyclate (VIBRA-TABS) tablet 100 mg  100 mg Oral 2 times per day Chitra Landa MD        ipratropium 0.5 mg-albuterol 2.5 mg (DUONEB) nebulizer solution 1 Dose  1 Dose Inhalation Q4H WA RT Bess Wick MD        methylPREDNISolone sodium succ (SOLU-MEDROL) 60 mg in sterile water 0.96 mL injection  60 mg IntraVENous Q6H Bess Wick MD        sodium chloride flush 0.9 % injection 5-40 mL  5-40 mL IntraVENous 2 times per day Chitra Landa MD   10 mL at 08/25/24 2101    sodium chloride flush 0.9 % injection 5-40 mL  5-40 mL IntraVENous PRN Chitra Landa MD        0.9 % sodium chloride infusion   IntraVENous PRN Chitra Landa MD        enoxaparin (LOVENOX) injection 40 mg  40 mg SubCUTAneous Daily Chitra Landa MD   40 mg at 08/26/24 0021    ondansetron (ZOFRAN-ODT) disintegrating tablet 4 mg  4 mg Oral Q8H PRN Chitra Landa MD        Or    ondansetron (ZOFRAN) injection 4 mg  4 mg IntraVENous Q6H PRN  (PROTONIX) 40 mg in sodium chloride (PF) 0.9 % 10 mL injection  40 mg IntraVENous Daily    arformoterol tartrate (BROVANA) nebulizer solution 15 mcg  15 mcg Nebulization BID RT    budesonide (PULMICORT) nebulizer suspension 500 mcg  0.5 mg Nebulization BID RT    amLODIPine (NORVASC) tablet 10 mg  10 mg Oral Nightly    lisinopril (PRINIVIL;ZESTRIL) tablet 20 mg  20 mg Oral Nightly     Allergies  Allergies   Allergen Reactions    Hydrocodone-Acetaminophen Itching     States \"it makes my feet itch.\"    Codeine Rash     PT DENIES     CBC:  Recent Labs     08/25/24  1801   WBC 8.0   HGB 13.1        Metabolic Panel:  Recent Labs     08/25/24  1801      K 3.3*      CO2 28   BUN 21   MG 1.8   ALT 19     Imaging/procedures:   XR CHEST PORTABLE  Narrative: EXAM: XR CHEST PORTABLE  ACC#: [id number]    INDICATION: shortness of breath    COMPARISON: 12/11/2022    TECHNIQUE: AP portable upright view of the chest.    FINDINGS:    Lungs are clear.  The cardiomediastinal configuration is within normal limits.  No acute bony abnormalities.  Impression: No acute cardiopulmonary abnormalities.    Electronically signed by RANDAL Jarvis            Assessment and Plan     Araceli Segundo is a 63 y.o. female with a PMHx of COPD, tobacco use, HTN, and HLD who is admitted for COPD exacerbation .       COPD Exacerbation  2/4 SIRS: Hx of increasing cough, SOB x3d and wheezing with diminished air sounds on exam. Etiology likely viral URI given subjective hx of fevers and fatigue at home. COVID, Flu neg. Troponin 9.6>9.8, . Procal <0.05. 86% RA, improved to >90% on 4L NC. No baseline O2 at home currently. In ED, s/p decadron x1, Duonebs x2. Sx not much improved after home or ED breathing treatments. CXR NAP. Low suspicion for CHF or ACS, as BNP mildly elevated, EKG nml, and sx consistent with COPDe. Lower suspicion for PE with low risk by Wells (1.5pt). 2/4 SIRS (HR, RR), likely iso COPDe and therapy. Not meeting

## 2024-08-27 ENCOUNTER — APPOINTMENT (OUTPATIENT)
Facility: HOSPITAL | Age: 64
DRG: 190 | End: 2024-08-27
Payer: MEDICARE

## 2024-08-27 LAB
ANION GAP SERPL CALC-SCNC: 5 MMOL/L (ref 5–15)
BASOPHILS # BLD: 0 K/UL (ref 0–0.1)
BASOPHILS NFR BLD: 0 % (ref 0–1)
BUN SERPL-MCNC: 28 MG/DL (ref 6–20)
BUN/CREAT SERPL: 28 (ref 12–20)
CALCIUM SERPL-MCNC: 9.4 MG/DL (ref 8.5–10.1)
CHLORIDE SERPL-SCNC: 106 MMOL/L (ref 97–108)
CO2 SERPL-SCNC: 29 MMOL/L (ref 21–32)
CREAT SERPL-MCNC: 1.01 MG/DL (ref 0.55–1.02)
DIFFERENTIAL METHOD BLD: ABNORMAL
EOSINOPHIL # BLD: 0 K/UL (ref 0–0.4)
EOSINOPHIL NFR BLD: 0 % (ref 0–7)
ERYTHROCYTE [DISTWIDTH] IN BLOOD BY AUTOMATED COUNT: 13.3 % (ref 11.5–14.5)
GLUCOSE SERPL-MCNC: 141 MG/DL (ref 65–100)
HCT VFR BLD AUTO: 37.7 % (ref 35–47)
HGB BLD-MCNC: 12.4 G/DL (ref 11.5–16)
IMM GRANULOCYTES # BLD AUTO: 0.2 K/UL (ref 0–0.04)
IMM GRANULOCYTES NFR BLD AUTO: 2 % (ref 0–0.5)
LYMPHOCYTES # BLD: 0.9 K/UL (ref 0.8–3.5)
LYMPHOCYTES NFR BLD: 9 % (ref 12–49)
MAGNESIUM SERPL-MCNC: 2.4 MG/DL (ref 1.6–2.4)
MCH RBC QN AUTO: 28.1 PG (ref 26–34)
MCHC RBC AUTO-ENTMCNC: 32.9 G/DL (ref 30–36.5)
MCV RBC AUTO: 85.5 FL (ref 80–99)
MONOCYTES # BLD: 0.3 K/UL (ref 0–1)
MONOCYTES NFR BLD: 3 % (ref 5–13)
NEUTS SEG # BLD: 8.6 K/UL (ref 1.8–8)
NEUTS SEG NFR BLD: 86 % (ref 32–75)
NRBC # BLD: 0 K/UL (ref 0–0.01)
NRBC BLD-RTO: 0 PER 100 WBC
PLATELET # BLD AUTO: 299 K/UL (ref 150–400)
PMV BLD AUTO: 8.8 FL (ref 8.9–12.9)
POTASSIUM SERPL-SCNC: 3.9 MMOL/L (ref 3.5–5.1)
RBC # BLD AUTO: 4.41 M/UL (ref 3.8–5.2)
SODIUM SERPL-SCNC: 140 MMOL/L (ref 136–145)
WBC # BLD AUTO: 9.9 K/UL (ref 3.6–11)

## 2024-08-27 PROCEDURE — 6360000002 HC RX W HCPCS

## 2024-08-27 PROCEDURE — 94669 MECHANICAL CHEST WALL OSCILL: CPT

## 2024-08-27 PROCEDURE — 6370000000 HC RX 637 (ALT 250 FOR IP)

## 2024-08-27 PROCEDURE — 71045 X-RAY EXAM CHEST 1 VIEW: CPT

## 2024-08-27 PROCEDURE — 6360000002 HC RX W HCPCS: Performed by: INTERNAL MEDICINE

## 2024-08-27 PROCEDURE — 80048 BASIC METABOLIC PNL TOTAL CA: CPT

## 2024-08-27 PROCEDURE — 94640 AIRWAY INHALATION TREATMENT: CPT

## 2024-08-27 PROCEDURE — 94761 N-INVAS EAR/PLS OXIMETRY MLT: CPT

## 2024-08-27 PROCEDURE — 6370000000 HC RX 637 (ALT 250 FOR IP): Performed by: FAMILY MEDICINE

## 2024-08-27 PROCEDURE — 2700000000 HC OXYGEN THERAPY PER DAY

## 2024-08-27 PROCEDURE — 83735 ASSAY OF MAGNESIUM: CPT

## 2024-08-27 PROCEDURE — 99233 SBSQ HOSP IP/OBS HIGH 50: CPT | Performed by: FAMILY MEDICINE

## 2024-08-27 PROCEDURE — 85025 COMPLETE CBC W/AUTO DIFF WBC: CPT

## 2024-08-27 PROCEDURE — 1100000000 HC RM PRIVATE

## 2024-08-27 PROCEDURE — 2580000003 HC RX 258: Performed by: INTERNAL MEDICINE

## 2024-08-27 PROCEDURE — 36415 COLL VENOUS BLD VENIPUNCTURE: CPT

## 2024-08-27 PROCEDURE — 2580000003 HC RX 258

## 2024-08-27 PROCEDURE — 94667 MNPJ CHEST WALL 1ST: CPT

## 2024-08-27 PROCEDURE — 6370000000 HC RX 637 (ALT 250 FOR IP): Performed by: PHYSICIAN ASSISTANT

## 2024-08-27 RX ORDER — FUROSEMIDE 10 MG/ML
20 INJECTION INTRAMUSCULAR; INTRAVENOUS ONCE
Status: COMPLETED | OUTPATIENT
Start: 2024-08-27 | End: 2024-08-27

## 2024-08-27 RX ORDER — IPRATROPIUM BROMIDE AND ALBUTEROL SULFATE 2.5; .5 MG/3ML; MG/3ML
1 SOLUTION RESPIRATORY (INHALATION)
Status: DISCONTINUED | OUTPATIENT
Start: 2024-08-27 | End: 2024-08-29 | Stop reason: HOSPADM

## 2024-08-27 RX ORDER — DOXYCYCLINE HYCLATE 100 MG
100 TABLET ORAL EVERY 12 HOURS SCHEDULED
Status: COMPLETED | OUTPATIENT
Start: 2024-08-27 | End: 2024-08-27

## 2024-08-27 RX ADMIN — BUDESONIDE 500 MCG: 0.5 INHALANT RESPIRATORY (INHALATION) at 20:57

## 2024-08-27 RX ADMIN — ENOXAPARIN SODIUM 40 MG: 100 INJECTION SUBCUTANEOUS at 17:11

## 2024-08-27 RX ADMIN — IPRATROPIUM BROMIDE AND ALBUTEROL SULFATE 1 DOSE: 2.5; .5 SOLUTION RESPIRATORY (INHALATION) at 20:57

## 2024-08-27 RX ADMIN — AMLODIPINE BESYLATE 10 MG: 5 TABLET ORAL at 21:48

## 2024-08-27 RX ADMIN — IPRATROPIUM BROMIDE AND ALBUTEROL SULFATE 1 DOSE: 2.5; .5 SOLUTION RESPIRATORY (INHALATION) at 15:46

## 2024-08-27 RX ADMIN — BUDESONIDE 500 MCG: 0.5 INHALANT RESPIRATORY (INHALATION) at 07:52

## 2024-08-27 RX ADMIN — DOXYCYCLINE HYCLATE 100 MG: 100 TABLET, COATED ORAL at 09:09

## 2024-08-27 RX ADMIN — IPRATROPIUM BROMIDE AND ALBUTEROL SULFATE 1 DOSE: 2.5; .5 SOLUTION RESPIRATORY (INHALATION) at 23:54

## 2024-08-27 RX ADMIN — PANTOPRAZOLE SODIUM 40 MG: 40 INJECTION, POWDER, FOR SOLUTION INTRAVENOUS at 09:09

## 2024-08-27 RX ADMIN — IPRATROPIUM BROMIDE AND ALBUTEROL SULFATE 1 DOSE: 2.5; .5 SOLUTION RESPIRATORY (INHALATION) at 12:04

## 2024-08-27 RX ADMIN — IPRATROPIUM BROMIDE AND ALBUTEROL SULFATE 1 DOSE: 2.5; .5 SOLUTION RESPIRATORY (INHALATION) at 00:54

## 2024-08-27 RX ADMIN — IPRATROPIUM BROMIDE AND ALBUTEROL SULFATE 1 DOSE: 2.5; .5 SOLUTION RESPIRATORY (INHALATION) at 07:47

## 2024-08-27 RX ADMIN — METHYLPREDNISOLONE SODIUM SUCCINATE 60 MG: 125 INJECTION, POWDER, LYOPHILIZED, FOR SOLUTION INTRAMUSCULAR; INTRAVENOUS at 00:47

## 2024-08-27 RX ADMIN — ACETAMINOPHEN 650 MG: 325 TABLET ORAL at 17:11

## 2024-08-27 RX ADMIN — METHYLPREDNISOLONE SODIUM SUCCINATE 60 MG: 125 INJECTION, POWDER, LYOPHILIZED, FOR SOLUTION INTRAMUSCULAR; INTRAVENOUS at 17:11

## 2024-08-27 RX ADMIN — SODIUM CHLORIDE, PRESERVATIVE FREE 10 ML: 5 INJECTION INTRAVENOUS at 21:50

## 2024-08-27 RX ADMIN — ARFORMOTEROL TARTRATE 15 MCG: 15 SOLUTION RESPIRATORY (INHALATION) at 07:52

## 2024-08-27 RX ADMIN — METHYLPREDNISOLONE SODIUM SUCCINATE 60 MG: 125 INJECTION, POWDER, LYOPHILIZED, FOR SOLUTION INTRAMUSCULAR; INTRAVENOUS at 05:12

## 2024-08-27 RX ADMIN — ARFORMOTEROL TARTRATE 15 MCG: 15 SOLUTION RESPIRATORY (INHALATION) at 20:57

## 2024-08-27 RX ADMIN — FUROSEMIDE 20 MG: 10 INJECTION, SOLUTION INTRAMUSCULAR; INTRAVENOUS at 11:41

## 2024-08-27 RX ADMIN — LISINOPRIL 20 MG: 20 TABLET ORAL at 21:49

## 2024-08-27 RX ADMIN — METHYLPREDNISOLONE SODIUM SUCCINATE 60 MG: 125 INJECTION, POWDER, LYOPHILIZED, FOR SOLUTION INTRAMUSCULAR; INTRAVENOUS at 11:18

## 2024-08-27 RX ADMIN — SODIUM CHLORIDE, PRESERVATIVE FREE 10 ML: 5 INJECTION INTRAVENOUS at 09:10

## 2024-08-27 RX ADMIN — DOXYCYCLINE HYCLATE 100 MG: 100 TABLET, COATED ORAL at 21:49

## 2024-08-27 NOTE — PROGRESS NOTES
Subjective / Objective     Subjective  Overnight Events: NAEO.     Patient seen and examined at bedside. Still having shortness of breath, wheezing, and not back to her BL.      Respiratory:     BP (!) 123/92   Pulse 85   Temp 97.5 °F (36.4 °C)   Resp 20   Ht 1.575 m (5' 2\")   Wt 77.1 kg (170 lb)   SpO2 96%   BMI 31.09 kg/m²      Physical Examination:   General appearance - alert, NAD. NC in place.  Chest - Diminished lung sounds, exp wheezing through all lung fields (improved from 8/26). Fine crackles / rales at RLB.   Heart - normal rate, regular rhythm, normal S1, S2, no murmurs, rubs, clicks or gallops,   Abdomen - soft, nontender, nondistended, no masses or organomegaly  Neurological - alert, oriented, normal speech, no focal findings  Skin - warm, dry. No notable rashes  Extremities - No pedal edema, no clubbing or cyanosis  Psychiatric - normal speech and thought processes    I/O:  08/26 0701 - 08/27 0700  In: -   Out: 550 [Urine:550]    Inpatient Medications   Current Facility-Administered Medications   Medication Dose Route Frequency Provider Last Rate Last Admin    ipratropium 0.5 mg-albuterol 2.5 mg (DUONEB) nebulizer solution 1 Dose  1 Dose Inhalation Q4H WA RT Ashley Amador, PA        doxycycline hyclate (VIBRA-TABS) tablet 100 mg  100 mg Oral 2 times per day Chitra Landa MD   100 mg at 08/26/24 2123    methylPREDNISolone sodium succ (SOLU-MEDROL) 60 mg in sterile water 0.96 mL injection  60 mg IntraVENous Q6H Bess Wick MD   60 mg at 08/27/24 0512    sodium chloride flush 0.9 % injection 5-40 mL  5-40 mL IntraVENous 2 times per day Chitra Landa MD   10 mL at 08/26/24 2245    sodium chloride flush 0.9 % injection 5-40 mL  5-40 mL IntraVENous PRN Chitra Landa MD        0.9 % sodium chloride infusion   IntraVENous PRN Chitra Landa MD        enoxaparin (LOVENOX) injection 40 mg  40 mg SubCUTAneous Daily Chitra Landa MD   40 mg at 08/26/24 4775  packet 17 g  17 g Oral Daily PRN    acetaminophen (TYLENOL) tablet 650 mg  650 mg Oral Q6H PRN    Or    acetaminophen (TYLENOL) suppository 650 mg  650 mg Rectal Q6H PRN    pantoprazole (PROTONIX) 40 mg in sodium chloride (PF) 0.9 % 10 mL injection  40 mg IntraVENous Daily    arformoterol tartrate (BROVANA) nebulizer solution 15 mcg  15 mcg Nebulization BID RT    budesonide (PULMICORT) nebulizer suspension 500 mcg  0.5 mg Nebulization BID RT    amLODIPine (NORVASC) tablet 10 mg  10 mg Oral Nightly    lisinopril (PRINIVIL;ZESTRIL) tablet 20 mg  20 mg Oral Nightly     Allergies  Allergies   Allergen Reactions    Hydrocodone-Acetaminophen Itching     States \"it makes my feet itch.\"    Codeine Rash     PT DENIES     CBC:  Recent Labs     08/25/24  1801 08/26/24  0917 08/27/24  0155   WBC 8.0 7.5 9.9   HGB 13.1 12.4 12.4    278 299     Metabolic Panel:  Recent Labs     08/25/24  1801 08/26/24  0917 08/27/24  0155    140 140   K 3.3* 3.6 3.9    103 106   CO2 28 30 29   BUN 21 20 28*   MG 1.8 2.5* 2.4   ALT 19  --   --      Imaging/procedures:   XR CHEST PORTABLE  Narrative: EXAM: XR CHEST PORTABLE  ACC#: [id number]    INDICATION: shortness of breath    COMPARISON: 12/11/2022    TECHNIQUE: AP portable upright view of the chest.    FINDINGS:    Lungs are clear.  The cardiomediastinal configuration is within normal limits.  No acute bony abnormalities.  Impression: No acute cardiopulmonary abnormalities.    Electronically signed by RANDAL Jarvis            Assessment and Plan     Araceli Segundo is a 63 y.o. female with a PMHx of COPD, tobacco use, HTN, and HLD who is admitted for COPD exacerbation .       COPD Exacerbation  2/4 SIRS: Continues to have increased work of breahting. Suspect viral etiology overall. COVID, Flu neg. Troponin 9.6>9.8, . Procal <0.05. 86% RA, improved to >90% on 4L NC. No baseline O2 at home currently. CXR NAP.   - Follow respiratory cultures, MRSA nares  - continue  supplemental O2, wean as tolerated               - O2 therapy: target SpO2 88-94%  - IV Solumedrol 60mg q6h then taper to PO   - IV Protonix 40mg daily for SUP  - Duonebs (albuterol + ipratropium) q4hrs WA  - PO Doxycycline 100mg BID for 7d total course (last day 8/28)   - Sub home Trelegy for Brovana/pulmicort  - 6 minute walk test before discharge: if PaO2 < 55 or SpO2 < 88%, consider outpatient Oxygen  - Consult Pulm; appreciate recs   - Resp PT  - rCXR pending.      Hypokalemia  Hypomagnesemia: Resolved.  - Replete prn     Hypertension: improved.  On Lotrel 10-20mg at home.  - Continue Lisinopril 20mg qhs, amlodipine 10mg qhs (sub for Lotrel)  - Will continue to monitor at this time and readjust as BP's trend.     GERD: stable on home Prilosec qd.   - Sub home prilosec for IV Protonix 40 mg QD      Prediabetes: No home medications, lifestyle controlled.          Hemoglobin A1C   Date Value Ref Range Status   11/08/2023 5.8 (H) 4.0 - 5.6 % Final       Comment:       (NOTE)  HbA1C Interpretive Ranges  <5.7              Normal  5.7 - 6.4         Consider Prediabetes  >6.5              Consider Diabetes      - F/u with PCP     Hyperlipidemia: No home medications, lifestyle controlled.         Lab Results   Component Value Date     CHOL 257 (H) 11/08/2023     TRIG 130 11/08/2023     HDL 91 11/08/2023      (H) 11/08/2023     VLDL 26 11/08/2023     CHOLHDLRATIO 2.8 11/08/2023   - F/u with PCP     Obesity BMI Body mass index is 31.09 kg/m².  - Encouraging lifestyle modifications and further follow up outpatient.         FEN/GI - Regular diet.   Activity - Ambulate as tolerated  DVT prophylaxis - Lovenox  GI prophylaxis - Protonix  Fall prophylaxis - Not indicated at this time.  Disposition -  Plan to d/c to Home.   Code Status - Full, discussed with patient / caregivers.  Next of Kin Name and Contact Torrie Pitts (Child) [phone] (Mobile)     Patient will be discussed with Hoa Goodwin

## 2024-08-27 NOTE — PLAN OF CARE
Problem: Discharge Planning  Goal: Discharge to home or other facility with appropriate resources  8/27/2024 0951 by Aureliano Covarrubias RN  Outcome: Progressing  8/27/2024 0501 by Haleigh Bee LPN  Outcome: Progressing     Problem: Respiratory - Adult  Goal: Achieves optimal ventilation and oxygenation  8/27/2024 0951 by Aureliano Covarrubias RN  Outcome: Progressing  8/27/2024 0807 by Kathia Wiley, RT  Outcome: Progressing  8/27/2024 0501 by Haleigh Bee LPN  Outcome: Progressing     Problem: Pain  Goal: Verbalizes/displays adequate comfort level or baseline comfort level  8/27/2024 0951 by Aureliano Covarrubias RN  Outcome: Progressing  8/27/2024 0501 by Haleigh Bee LPN  Outcome: Progressing     Problem: Safety - Adult  Goal: Free from fall injury  8/27/2024 0951 by Aureliano Covarrubias RN  Outcome: Progressing  8/27/2024 0501 by Haleigh Bee LPN  Outcome: Progressing

## 2024-08-27 NOTE — PROGRESS NOTES
Spiritual Health Assessment/Progress Note  [facility]    Rituals, Rites and Sacraments,  ,  ,      Name: Araceli Segundo MRN: [id number]    Age: 63 y.o.     Sex: female   Language: English   Catholic: Amish   COPD with acute exacerbation (HCC)     Date: 8/27/2024            Total Time Calculated: 5 min              Spiritual Assessment began in [facility]        Referral/Consult From: Clergy/   Encounter Overview/Reason: Rituals, Rites and Sacraments  Service Provided For: Patient    Yani, Belief, Meaning:   Patient is connected with a yani tradition or spiritual practice  Family/Friends are connected with a yani tradition or spiritual practice      Importance and Influence:  Patient has spiritual/personal beliefs that influence decisions regarding their health  Family/Friends have spiritual/personal beliefs that influence decisions regarding the patient's health    Community:  Patient is connected with a spiritual community  Family/Friends are connected with a spiritual community:    Assessment and Plan of Care:     Patient Interventions include: Facilitated expression of thoughts and feelings  Family/Friends Interventions include: Other: none    Patient Plan of Care: Spiritual Care available upon further referral  Family/Friends Plan of Care: Spiritual Care available upon further referral    Electronically signed by Chaplain ADAL on 8/27/2024 at 1:49 PM     [facility] visit.  Mrs. Segundo is Amish although since she moved to this area she does not have a parish she is affiliated with.  She sometimes attends Judaism with her son's family.  She was visited by the [employer]  today and declined communion.  She says she is okay and has no further spiritual needs today.  Assured of continued prayer for her.     Sr. DEREK Abreu, RN, ACSW, LCSW   Page:  [phone]
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
## 2024-08-27 NOTE — PLAN OF CARE
Problem: Discharge Planning  Goal: Discharge to home or other facility with appropriate resources  Outcome: Progressing     Problem: Respiratory - Adult  Goal: Achieves optimal ventilation and oxygenation  Outcome: Progressing     Problem: Pain  Goal: Verbalizes/displays adequate comfort level or baseline comfort level  Outcome: Progressing     Problem: Safety - Adult  Goal: Free from fall injury  Outcome: Progressing

## 2024-08-28 LAB
ANION GAP SERPL CALC-SCNC: 5 MMOL/L (ref 5–15)
BASOPHILS # BLD: 0 K/UL (ref 0–0.1)
BASOPHILS NFR BLD: 0 % (ref 0–1)
BUN SERPL-MCNC: 26 MG/DL (ref 6–20)
BUN/CREAT SERPL: 28 (ref 12–20)
CALCIUM SERPL-MCNC: 9.3 MG/DL (ref 8.5–10.1)
CHLORIDE SERPL-SCNC: 102 MMOL/L (ref 97–108)
CO2 SERPL-SCNC: 31 MMOL/L (ref 21–32)
CREAT SERPL-MCNC: 0.92 MG/DL (ref 0.55–1.02)
DIFFERENTIAL METHOD BLD: ABNORMAL
EOSINOPHIL # BLD: 0 K/UL (ref 0–0.4)
EOSINOPHIL NFR BLD: 0 % (ref 0–7)
ERYTHROCYTE [DISTWIDTH] IN BLOOD BY AUTOMATED COUNT: 13.2 % (ref 11.5–14.5)
GLUCOSE SERPL-MCNC: 159 MG/DL (ref 65–100)
HCT VFR BLD AUTO: 39.6 % (ref 35–47)
HGB BLD-MCNC: 13 G/DL (ref 11.5–16)
IMM GRANULOCYTES # BLD AUTO: 0 K/UL
IMM GRANULOCYTES NFR BLD AUTO: 0 %
LYMPHOCYTES # BLD: 0.8 K/UL (ref 0.8–3.5)
LYMPHOCYTES NFR BLD: 6 % (ref 12–49)
MAGNESIUM SERPL-MCNC: 2.2 MG/DL (ref 1.6–2.4)
MCH RBC QN AUTO: 28.3 PG (ref 26–34)
MCHC RBC AUTO-ENTMCNC: 32.8 G/DL (ref 30–36.5)
MCV RBC AUTO: 86.3 FL (ref 80–99)
MONOCYTES # BLD: 0.3 K/UL (ref 0–1)
MONOCYTES NFR BLD: 2 % (ref 5–13)
MYELOCYTES NFR BLD MANUAL: 1 %
NEUTS SEG # BLD: 12.1 K/UL (ref 1.8–8)
NEUTS SEG NFR BLD: 91 % (ref 32–75)
NRBC # BLD: 0 K/UL (ref 0–0.01)
NRBC BLD-RTO: 0 PER 100 WBC
PLATELET # BLD AUTO: 309 K/UL (ref 150–400)
PMV BLD AUTO: 8.8 FL (ref 8.9–12.9)
POTASSIUM SERPL-SCNC: 3.4 MMOL/L (ref 3.5–5.1)
RBC # BLD AUTO: 4.59 M/UL (ref 3.8–5.2)
RBC MORPH BLD: ABNORMAL
SODIUM SERPL-SCNC: 138 MMOL/L (ref 136–145)
WBC # BLD AUTO: 13.3 K/UL (ref 3.6–11)

## 2024-08-28 PROCEDURE — 6360000002 HC RX W HCPCS

## 2024-08-28 PROCEDURE — 6370000000 HC RX 637 (ALT 250 FOR IP): Performed by: PHYSICIAN ASSISTANT

## 2024-08-28 PROCEDURE — 99233 SBSQ HOSP IP/OBS HIGH 50: CPT | Performed by: FAMILY MEDICINE

## 2024-08-28 PROCEDURE — 97161 PT EVAL LOW COMPLEX 20 MIN: CPT

## 2024-08-28 PROCEDURE — 2580000003 HC RX 258: Performed by: INTERNAL MEDICINE

## 2024-08-28 PROCEDURE — 6360000002 HC RX W HCPCS: Performed by: PHYSICIAN ASSISTANT

## 2024-08-28 PROCEDURE — 2580000003 HC RX 258

## 2024-08-28 PROCEDURE — 80048 BASIC METABOLIC PNL TOTAL CA: CPT

## 2024-08-28 PROCEDURE — 94640 AIRWAY INHALATION TREATMENT: CPT

## 2024-08-28 PROCEDURE — 6370000000 HC RX 637 (ALT 250 FOR IP)

## 2024-08-28 PROCEDURE — 2700000000 HC OXYGEN THERAPY PER DAY

## 2024-08-28 PROCEDURE — 94664 DEMO&/EVAL PT USE INHALER: CPT

## 2024-08-28 PROCEDURE — 94669 MECHANICAL CHEST WALL OSCILL: CPT

## 2024-08-28 PROCEDURE — 85025 COMPLETE CBC W/AUTO DIFF WBC: CPT

## 2024-08-28 PROCEDURE — 36415 COLL VENOUS BLD VENIPUNCTURE: CPT

## 2024-08-28 PROCEDURE — 97116 GAIT TRAINING THERAPY: CPT

## 2024-08-28 PROCEDURE — 97530 THERAPEUTIC ACTIVITIES: CPT

## 2024-08-28 PROCEDURE — 2580000003 HC RX 258: Performed by: PHYSICIAN ASSISTANT

## 2024-08-28 PROCEDURE — 1100000000 HC RM PRIVATE

## 2024-08-28 PROCEDURE — 94010 BREATHING CAPACITY TEST: CPT

## 2024-08-28 PROCEDURE — 83735 ASSAY OF MAGNESIUM: CPT

## 2024-08-28 PROCEDURE — 94761 N-INVAS EAR/PLS OXIMETRY MLT: CPT

## 2024-08-28 PROCEDURE — 6360000002 HC RX W HCPCS: Performed by: INTERNAL MEDICINE

## 2024-08-28 RX ORDER — PANTOPRAZOLE SODIUM 40 MG/1
40 TABLET, DELAYED RELEASE ORAL
Status: DISCONTINUED | OUTPATIENT
Start: 2024-08-29 | End: 2024-08-29 | Stop reason: HOSPADM

## 2024-08-28 RX ADMIN — POTASSIUM BICARBONATE 20 MEQ: 782 TABLET, EFFERVESCENT ORAL at 06:40

## 2024-08-28 RX ADMIN — IPRATROPIUM BROMIDE AND ALBUTEROL SULFATE 1 DOSE: 2.5; .5 SOLUTION RESPIRATORY (INHALATION) at 16:28

## 2024-08-28 RX ADMIN — BUDESONIDE 500 MCG: 0.5 INHALANT RESPIRATORY (INHALATION) at 07:16

## 2024-08-28 RX ADMIN — ACETAMINOPHEN 650 MG: 325 TABLET ORAL at 09:14

## 2024-08-28 RX ADMIN — IPRATROPIUM BROMIDE AND ALBUTEROL SULFATE 1 DOSE: 2.5; .5 SOLUTION RESPIRATORY (INHALATION) at 19:55

## 2024-08-28 RX ADMIN — SODIUM CHLORIDE, PRESERVATIVE FREE 10 ML: 5 INJECTION INTRAVENOUS at 08:57

## 2024-08-28 RX ADMIN — ENOXAPARIN SODIUM 40 MG: 100 INJECTION SUBCUTANEOUS at 17:58

## 2024-08-28 RX ADMIN — BUDESONIDE 500 MCG: 0.5 INHALANT RESPIRATORY (INHALATION) at 20:00

## 2024-08-28 RX ADMIN — WATER 40 MG: 1 INJECTION INTRAMUSCULAR; INTRAVENOUS; SUBCUTANEOUS at 14:45

## 2024-08-28 RX ADMIN — IPRATROPIUM BROMIDE AND ALBUTEROL SULFATE 1 DOSE: 2.5; .5 SOLUTION RESPIRATORY (INHALATION) at 11:06

## 2024-08-28 RX ADMIN — IPRATROPIUM BROMIDE AND ALBUTEROL SULFATE 1 DOSE: 2.5; .5 SOLUTION RESPIRATORY (INHALATION) at 07:10

## 2024-08-28 RX ADMIN — SODIUM CHLORIDE, PRESERVATIVE FREE 10 ML: 5 INJECTION INTRAVENOUS at 21:19

## 2024-08-28 RX ADMIN — ARFORMOTEROL TARTRATE 15 MCG: 15 SOLUTION RESPIRATORY (INHALATION) at 20:01

## 2024-08-28 RX ADMIN — WATER 40 MG: 1 INJECTION INTRAMUSCULAR; INTRAVENOUS; SUBCUTANEOUS at 22:26

## 2024-08-28 RX ADMIN — PANTOPRAZOLE SODIUM 40 MG: 40 INJECTION, POWDER, FOR SOLUTION INTRAVENOUS at 08:57

## 2024-08-28 RX ADMIN — METHYLPREDNISOLONE SODIUM SUCCINATE 60 MG: 125 INJECTION, POWDER, LYOPHILIZED, FOR SOLUTION INTRAMUSCULAR; INTRAVENOUS at 00:48

## 2024-08-28 RX ADMIN — ARFORMOTEROL TARTRATE 15 MCG: 15 SOLUTION RESPIRATORY (INHALATION) at 07:16

## 2024-08-28 RX ADMIN — AMLODIPINE BESYLATE 10 MG: 5 TABLET ORAL at 21:18

## 2024-08-28 RX ADMIN — LISINOPRIL 20 MG: 20 TABLET ORAL at 21:18

## 2024-08-28 RX ADMIN — METHYLPREDNISOLONE SODIUM SUCCINATE 60 MG: 125 INJECTION, POWDER, LYOPHILIZED, FOR SOLUTION INTRAMUSCULAR; INTRAVENOUS at 05:09

## 2024-08-28 ASSESSMENT — PAIN DESCRIPTION - DESCRIPTORS: DESCRIPTORS: ACHING

## 2024-08-28 ASSESSMENT — PAIN SCALES - GENERAL
PAINLEVEL_OUTOF10: 0
PAINLEVEL_OUTOF10: 4

## 2024-08-28 ASSESSMENT — COPD QUESTIONNAIRES
QUESTION8_ENERGYLEVEL: 2
CAT_TOTALSCORE: 15
QUESTION5_HOMEACTIVITIES: 1
GOLD_GROUP: GROUP B
QUESTION7_SLEEPQUALITY: 1
QUESTION4_WALKINCLINE: 2
QUESTION3_CHESTTIGHTNESS: 2
QUESTION6_LEAVINGHOUSE: 1
QUESTION1_COUGHFREQUENCY: 3
QUESTION2_CHESTPHLEGM: 3

## 2024-08-28 ASSESSMENT — PAIN DESCRIPTION - LOCATION: LOCATION: RIB CAGE

## 2024-08-28 ASSESSMENT — PULMONARY FUNCTION TESTS: FEV1 (%PREDICTED): 49

## 2024-08-28 ASSESSMENT — PAIN DESCRIPTION - ORIENTATION: ORIENTATION: LEFT

## 2024-08-28 NOTE — CARE COORDINATION
8/28/2024 5:07 PM       Care Management Initial Assessment  8/28/2024 5:07 PM  If patient is discharged prior to next notation, then this note serves as note for discharge by case management.    Reason for Admission:   Tobacco abuse [Z72.0]  COPD exacerbation (HCC) [J44.1]  COPD with acute exacerbation (HCC) [J44.1]         Patient Admission Status: Inpatient  RUR: Readmission Risk Score: 8.2    Hospitalization in the last 30 days (Readmission):  No        Advance Care Planning:  Code Status: Full Code  Primary Healthcare Decision Maker: Named in Scanned ACP Document  Primary Decision Maker: HongLianaTorrie - Child - [phone]    Secondary Decision Maker: JuanitaBrenda - Child - [phone]   Advance Directive: has an advanced directive - a copy has been provided, pt requesting to update, consult placed to Spiritual Care     __________________________________________________________________________  Assessment:      08/28/24 1704   Service Assessment   Patient Orientation Alert and Oriented   Cognition Alert   History Provided By Patient   Primary Caregiver Self   Support Systems Family Members;Children   Patient's Healthcare Decision Maker is: Named in Scanned ACP Document   PCP Verified by CM Yes   Last Visit to PCP Within last 3 months   Prior Functional Level Independent in ADLs/IADLs   Can patient return to prior living arrangement Yes   Ability to make needs known: Good   Family able to assist with home care needs: Yes   Would you like for me to discuss the discharge plan with any other family members/significant others, and if so, who? Yes  (If needed)   Financial Resources Medicare  ([employer])   Community Resources None   Social/Functional History   Lives With Alone   Type of Home Apartment   Home Layout One level   Home Access Elevator   Home Equipment Oxygen  (Nebulizer)   ADL Assistance Independent   Ambulation Assistance Independent   Transfer Assistance Independent   Active  No   Mode of  Transportation Car   Discharge Planning   Type of Residence Apartment   Living Arrangements Alone   Current Services Prior To Admission Other (Comment)  (Outpatient PT)   Potential Assistance Needed N/A   DME Ordered? No   Potential Assistance Purchasing Medications No   Type of Home Care Services None   Patient expects to be discharged to: House   Services At/After Discharge   Transition of Care Consult (CM Consult) Other  (Ansible)   Services At/After Discharge None    Resource Information Provided? No   Mode of Transport at Discharge Other (see comment)  (Family)       __________________________________________________________________________    Insurer:   Active Insurance as of 8/25/2024       Primary Coverage       Payor Plan Insurance Group Employer/Plan Group    HUMANA MEDICARE HUMANA GOLD PLUS HMO [id number]       Payor Plan Address Payor Plan Phone Number Payor Plan Fax Number Effective Dates    [place] [address] [phone]  10/1/2023 - None Entered    [facility] [address]         Subscriber Name Subscriber Birth Date Member ID       SAMMY PERSAUD 1960 [id number]                     PCP: Ana Phelan DO   Address: [address] [place] [address] / [place] [address]   Phone number: [phone]    Pharmacy:   [facility]/pharmacy #[id number] [address] -  [phone] - F [phone]  [address] [place] [address]  Phone: [phone] Fax: [phone]    DC Transport:  Pt's family        Transition of care plan:      Outpatient therapy: Currently receiving therapy at [facility]    HH: No history    IPR/SNF: No history   DME: Nebulizer and O2 through [employer], pt reported she only has 1 portable O2 tank at home. Will need O2 tank provided by [employer] at discharge.   Dialysis: N/a  DC ride: Family      Pt requested to be screened for Medicaid, referral sent to [facility].     Ansible consult sent via [facility].   Patient plans to dc home once medically stable.
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
## 2024-08-28 NOTE — PROGRESS NOTES
Pharmacy Dosing Services: 8/28/24    The pharmacist has determined that this patient meets P & T approved criteria for conversion from IV to oral therapy for the following medication: Pantoprazole      The pharmacist has written the following order for the patient: 40mg po daily  The pharmacist will continue to monitor the patient's status and advise the physician if conversion back to IV therapy is recommended.    Signed Dean Parnell RPH Contact information: [phone]

## 2024-08-28 NOTE — PROGRESS NOTES
19:50 Patient bed alarm was going off due to patient trying to go to the bathroom. Patient does not want the bed alarm on. She is very upset by having the bed alarm on. I explain to the patient that the bed alarm is in place for her safety. She has SOB and dyspnea when talking. I educated patient on the importance of the bed alarm and patient still refused to have bed alarm on. Patient state no other nurse has had the bed alarm on since she had been here and still want the alarm off. I asked patient to please call out when she is getting up so staff would be aware of her movements. Patient agreed to call out if she got out of bed.

## 2024-08-28 NOTE — PROGRESS NOTES
Hospital pulmonary follow up set for Tuesday September 10th 2024 at 11:30 AM with Ashley BAILEY at their [facility] location.

## 2024-08-28 NOTE — PLAN OF CARE
Problem: Discharge Planning  Goal: Discharge to home or other facility with appropriate resources  8/28/2024 1032 by Ally Herrera RN  Outcome: Progressing  8/28/2024 0140 by Haleigh Bee LPN  Outcome: Progressing     Problem: Respiratory - Adult  Goal: Achieves optimal ventilation and oxygenation  8/28/2024 1032 by Ally Herrera RN  Outcome: Progressing  8/28/2024 0907 by Mireya Banda, RT  Outcome: Progressing  8/28/2024 0140 by Haleigh Bee LPN  Outcome: Progressing     Problem: Pain  Goal: Verbalizes/displays adequate comfort level or baseline comfort level  8/28/2024 1032 by Ally Herrera RN  Outcome: Progressing  8/28/2024 0140 by Haleigh Bee LPN  Outcome: Progressing     Problem: Safety - Adult  Goal: Free from fall injury  8/28/2024 1032 by Ally Herrera RN  Outcome: Progressing  8/28/2024 0140 by Haleigh Bee LPN  Outcome: Progressing
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
## 2024-08-28 NOTE — PROGRESS NOTES
08/28/24 1147   Spirometry Assessment   FEV1 (%PRED) 49   COPD Assessment (CAT Score)   Cough Assessment 3   Phlegm Assessment 3   Chest tightness 2   Walking on an incline 2   Home Activities 1   Confident Leaving The Home 1   Sleeping Soundly 1   Have Energy 2   Assessment Score 15   $RT COPD Assessment Yes   GOLD Staging   Group Group B     Pt adm 8/25 with RA O2 sat = 86%, placed on 4L NC.  Currently on 2L.  Pt has O2 at home but has not worn for years.  Pt current smoker but cut back to a few cigarettes/day and is hoping to quit with this admission.  Pt consulted with Pulm and follows with Dr. Wick of [facility].  Will assess for home O2 needs and place follow up PULM appt with [facility].  Will place consult for [facility] home service.

## 2024-08-29 VITALS
DIASTOLIC BLOOD PRESSURE: 90 MMHG | TEMPERATURE: 97.9 F | HEIGHT: 62 IN | OXYGEN SATURATION: 94 % | WEIGHT: 170 LBS | BODY MASS INDEX: 31.28 KG/M2 | RESPIRATION RATE: 19 BRPM | SYSTOLIC BLOOD PRESSURE: 125 MMHG | HEART RATE: 112 BPM

## 2024-08-29 LAB
ANION GAP SERPL CALC-SCNC: 5 MMOL/L (ref 5–15)
BASOPHILS # BLD: 0 K/UL (ref 0–0.1)
BASOPHILS NFR BLD: 0 % (ref 0–1)
BUN SERPL-MCNC: 28 MG/DL (ref 6–20)
BUN/CREAT SERPL: 31 (ref 12–20)
CALCIUM SERPL-MCNC: 9 MG/DL (ref 8.5–10.1)
CHLORIDE SERPL-SCNC: 102 MMOL/L (ref 97–108)
CO2 SERPL-SCNC: 31 MMOL/L (ref 21–32)
CREAT SERPL-MCNC: 0.89 MG/DL (ref 0.55–1.02)
DIFFERENTIAL METHOD BLD: ABNORMAL
EOSINOPHIL # BLD: 0 K/UL (ref 0–0.4)
EOSINOPHIL NFR BLD: 0 % (ref 0–7)
ERYTHROCYTE [DISTWIDTH] IN BLOOD BY AUTOMATED COUNT: 13.4 % (ref 11.5–14.5)
GLUCOSE SERPL-MCNC: 152 MG/DL (ref 65–100)
HCT VFR BLD AUTO: 41.8 % (ref 35–47)
HGB BLD-MCNC: 13.7 G/DL (ref 11.5–16)
IMM GRANULOCYTES # BLD AUTO: 0 K/UL
IMM GRANULOCYTES NFR BLD AUTO: 0 %
LYMPHOCYTES # BLD: 1.4 K/UL (ref 0.8–3.5)
LYMPHOCYTES NFR BLD: 11 % (ref 12–49)
MAGNESIUM SERPL-MCNC: 2.4 MG/DL (ref 1.6–2.4)
MCH RBC QN AUTO: 28.5 PG (ref 26–34)
MCHC RBC AUTO-ENTMCNC: 32.8 G/DL (ref 30–36.5)
MCV RBC AUTO: 87.1 FL (ref 80–99)
MONOCYTES # BLD: 0.4 K/UL (ref 0–1)
MONOCYTES NFR BLD: 3 % (ref 5–13)
NEUTS SEG # BLD: 10.8 K/UL (ref 1.8–8)
NEUTS SEG NFR BLD: 86 % (ref 32–75)
NRBC # BLD: 0 K/UL (ref 0–0.01)
NRBC BLD-RTO: 0 PER 100 WBC
PLATELET # BLD AUTO: 332 K/UL (ref 150–400)
PMV BLD AUTO: 9.3 FL (ref 8.9–12.9)
POTASSIUM SERPL-SCNC: 4.1 MMOL/L (ref 3.5–5.1)
RBC # BLD AUTO: 4.8 M/UL (ref 3.8–5.2)
RBC MORPH BLD: ABNORMAL
SODIUM SERPL-SCNC: 138 MMOL/L (ref 136–145)
WBC # BLD AUTO: 12.6 K/UL (ref 3.6–11)

## 2024-08-29 PROCEDURE — 94761 N-INVAS EAR/PLS OXIMETRY MLT: CPT

## 2024-08-29 PROCEDURE — 2580000003 HC RX 258: Performed by: PHYSICIAN ASSISTANT

## 2024-08-29 PROCEDURE — 85025 COMPLETE CBC W/AUTO DIFF WBC: CPT

## 2024-08-29 PROCEDURE — 36415 COLL VENOUS BLD VENIPUNCTURE: CPT

## 2024-08-29 PROCEDURE — 2700000000 HC OXYGEN THERAPY PER DAY

## 2024-08-29 PROCEDURE — 6360000002 HC RX W HCPCS

## 2024-08-29 PROCEDURE — 6370000000 HC RX 637 (ALT 250 FOR IP): Performed by: PHYSICIAN ASSISTANT

## 2024-08-29 PROCEDURE — 6360000002 HC RX W HCPCS: Performed by: PHYSICIAN ASSISTANT

## 2024-08-29 PROCEDURE — 80048 BASIC METABOLIC PNL TOTAL CA: CPT

## 2024-08-29 PROCEDURE — 94640 AIRWAY INHALATION TREATMENT: CPT

## 2024-08-29 PROCEDURE — 94669 MECHANICAL CHEST WALL OSCILL: CPT

## 2024-08-29 PROCEDURE — 99238 HOSP IP/OBS DSCHRG MGMT 30/<: CPT | Performed by: FAMILY MEDICINE

## 2024-08-29 PROCEDURE — 83735 ASSAY OF MAGNESIUM: CPT

## 2024-08-29 PROCEDURE — 94618 PULMONARY STRESS TESTING: CPT

## 2024-08-29 PROCEDURE — 2580000003 HC RX 258

## 2024-08-29 PROCEDURE — 6370000000 HC RX 637 (ALT 250 FOR IP): Performed by: FAMILY MEDICINE

## 2024-08-29 RX ORDER — PREDNISONE 20 MG/1
20 TABLET ORAL DAILY
Qty: 10 TABLET | Refills: 0 | Status: CANCELLED | OUTPATIENT
Start: 2024-08-29 | End: 2024-09-08

## 2024-08-29 RX ORDER — PREDNISONE 10 MG/1
20 TABLET ORAL DAILY
Qty: 6 TABLET | Refills: 0 | Status: SHIPPED | OUTPATIENT
Start: 2024-09-02 | End: 2024-09-05

## 2024-08-29 RX ORDER — PREDNISONE 10 MG/1
10 TABLET ORAL DAILY
Qty: 10 TABLET | Refills: 0 | Status: CANCELLED | OUTPATIENT
Start: 2024-08-29 | End: 2024-09-08

## 2024-08-29 RX ORDER — PREDNISONE 20 MG/1
40 TABLET ORAL DAILY
Qty: 6 TABLET | Refills: 0 | Status: SHIPPED | OUTPATIENT
Start: 2024-08-29 | End: 2024-09-01

## 2024-08-29 RX ORDER — GUAIFENESIN 600 MG/1
600 TABLET, EXTENDED RELEASE ORAL 2 TIMES DAILY
Status: DISCONTINUED | OUTPATIENT
Start: 2024-08-29 | End: 2024-08-29 | Stop reason: HOSPADM

## 2024-08-29 RX ORDER — PREDNISONE 5 MG/1
5 TABLET ORAL DAILY
Qty: 10 TABLET | Refills: 0 | Status: CANCELLED | OUTPATIENT
Start: 2024-08-29 | End: 2024-09-08

## 2024-08-29 RX ORDER — PREDNISONE 10 MG/1
10 TABLET ORAL DAILY
Qty: 3 TABLET | Refills: 0 | Status: SHIPPED | OUTPATIENT
Start: 2024-09-06 | End: 2024-09-09

## 2024-08-29 RX ADMIN — WATER 40 MG: 1 INJECTION INTRAMUSCULAR; INTRAVENOUS; SUBCUTANEOUS at 06:36

## 2024-08-29 RX ADMIN — SODIUM CHLORIDE, PRESERVATIVE FREE 10 ML: 5 INJECTION INTRAVENOUS at 10:49

## 2024-08-29 RX ADMIN — PANTOPRAZOLE SODIUM 40 MG: 40 TABLET, DELAYED RELEASE ORAL at 06:36

## 2024-08-29 RX ADMIN — BUDESONIDE 500 MCG: 0.5 INHALANT RESPIRATORY (INHALATION) at 07:06

## 2024-08-29 RX ADMIN — IPRATROPIUM BROMIDE AND ALBUTEROL SULFATE 1 DOSE: 2.5; .5 SOLUTION RESPIRATORY (INHALATION) at 07:01

## 2024-08-29 RX ADMIN — ARFORMOTEROL TARTRATE 15 MCG: 15 SOLUTION RESPIRATORY (INHALATION) at 07:06

## 2024-08-29 RX ADMIN — WATER 40 MG: 1 INJECTION INTRAMUSCULAR; INTRAVENOUS; SUBCUTANEOUS at 14:00

## 2024-08-29 RX ADMIN — IPRATROPIUM BROMIDE AND ALBUTEROL SULFATE 1 DOSE: 2.5; .5 SOLUTION RESPIRATORY (INHALATION) at 11:54

## 2024-08-29 RX ADMIN — GUAIFENESIN 600 MG: 600 TABLET ORAL at 10:47

## 2024-08-29 ASSESSMENT — PAIN DESCRIPTION - LOCATION: LOCATION: ABDOMEN

## 2024-08-29 ASSESSMENT — PAIN SCALES - GENERAL
PAINLEVEL_OUTOF10: 0

## 2024-08-29 NOTE — DISCHARGE INSTRUCTIONS
HOME DISCHARGE INSTRUCTIONS    Araceli Segundo / [id number] : 1960    Admission date: 2024 Discharge date: 2024 11:58 AM     Please bring this form with you to show your care provider at your follow-up appointment.    Primary care provider:  Ana Phelan      Discharging provider:  Merissa Anderson MD  - Family Medicine Resident  Dr. Goodwin  - Family Medicine Attending      You have been admitted to the hospital with the following diagnoses:    ACUTE DIAGNOSES:  Tobacco abuse [Z72.0]  COPD exacerbation (HCC) [J44.1]  COPD with acute exacerbation (HCC) [J44.1]    Brief hospital course: You were admitted for a COPD exacerbation. You were seen by the pulmonology team. You were given antibiotics and steroids. You were given oxygen to help with your symptoms. Please continue taking the oral steroids outpatient. You will be going home with oxygen and with home health.    . . . . . . . . . . . . . . . . . . . . . . . . . . . . . . . . . . . . . . . . . . . . . . . . . . . . . . . . . . . . . . . . . . . . . .      MEDICATION CHANGES  -START: Prednisone taper  Take Prednisone 40 mg daily for 3 days, then 20 mg for 3 days, and 10 mg for 3 days  . . . . . . . . . . . . . . . . . . . . . . . . . . . . . . . . . . . . . . . . . . . . . . . . . . . . . . . . . . . . . . . . . . . . . . .     FOLLOW-UP CARE RECOMMENDATIONS:    Appointments  No future appointments.    LYNN Ariza  [place]  [facility]  ***NEW LOCATION***  Address:  [address] [place] [address].  Phone: [phone]  Fax: [phone]  Follow up on 9/10/2024  Hospital pulmonary follow up set for  at 11:30 AM with Ashley BAILEY at their [facility] location[place]  [address] [facility] [place] [address] [phone]    Follow up  routine hospital follow-up        Follow-up with your PCP regarding:  - Hospital  receipt of the instructions indicated above.                                                                                                                                               Physician's or R.N.'s Signature                                                                  Date/Time                                                                                                                                              Patient or Representative Signature                                                          Date/Time

## 2024-08-29 NOTE — PROGRESS NOTES
[address] [place] [address]   Office [phone]  Fax [phone]          Subjective / Objective     Subjective  Overnight Events: KIMBERLY Reports she is feeling much better and is only short of breath when ambulating. Feels like she has some congestion in her chest that still remains, but is overall improving.    Respiratory: 1L O2 in room  /85   Pulse 96   Temp 97.9 °F (36.6 °C) (Oral)   Resp 18   Ht 1.575 m (5' 2\")   Wt 77.1 kg (170 lb)   SpO2 96%   BMI 31.09 kg/m²    Physical Examination:   General appearance - alert and in no distress. NC in place.  Chest - Diminished breath sounds BL  Heart - normal rate, regular rhythm, normal S1, S2, no murmurs, rubs, clicks or gallops,   Abdomen - soft, nontender, nondistended, no masses or organomegaly  Neurological - alert, oriented, normal speech, no focal findings  Skin - warm, dry. No notable rashes  Extremities - Trace pitting edema to BLE.  Psychiatric - normal speech and thought processes    I/O:  08/28 0701 - 08/29 0700  In: 450 [P.O.:450]  Out: -   Inpatient Medications  Current Facility-Administered Medications   Medication Dose Route Frequency    methylPREDNISolone sodium succ (SOLU-MEDROL) 40 mg in sterile water 1 mL injection  40 mg IntraVENous Q8H    pantoprazole (PROTONIX) tablet 40 mg  40 mg Oral QAM AC    ipratropium 0.5 mg-albuterol 2.5 mg (DUONEB) nebulizer solution 1 Dose  1 Dose Inhalation Q4H WA RT    sodium chloride flush 0.9 % injection 5-40 mL  5-40 mL IntraVENous 2 times per day    sodium chloride flush 0.9 % injection 5-40 mL  5-40 mL IntraVENous PRN    0.9 % sodium chloride infusion   IntraVENous PRN    enoxaparin (LOVENOX) injection 40 mg  40 mg SubCUTAneous Daily    ondansetron (ZOFRAN-ODT) disintegrating tablet 4 mg  4 mg Oral Q8H PRN    Or    ondansetron (ZOFRAN) injection 4 mg  4 mg IntraVENous Q6H PRN    polyethylene glycol (GLYCOLAX) packet 17 g  17 g Oral Daily PRN    acetaminophen (TYLENOL) tablet  650 mg  650 mg Oral Q6H PRN    Or    acetaminophen (TYLENOL) suppository 650 mg  650 mg Rectal Q6H PRN    arformoterol tartrate (BROVANA) nebulizer solution 15 mcg  15 mcg Nebulization BID RT    budesonide (PULMICORT) nebulizer suspension 500 mcg  0.5 mg Nebulization BID RT    amLODIPine (NORVASC) tablet 10 mg  10 mg Oral Nightly    lisinopril (PRINIVIL;ZESTRIL) tablet 20 mg  20 mg Oral Nightly     Allergies  Allergies   Allergen Reactions    Hydrocodone-Acetaminophen Itching     States \"it makes my feet itch.\"    Codeine Rash     PT DENIES     CBC:  Recent Labs     08/27/24  0155 08/28/24  0431 08/29/24  0650   WBC 9.9 13.3* 12.6*   HGB 12.4 13.0 13.7    309 332     Metabolic Panel:  Recent Labs     08/27/24  0155 08/28/24  0431 08/29/24  0650    138 138   K 3.9 3.4* 4.1    102 102   CO2 29 31 31   BUN 28* 26* 28*   MG 2.4 2.2 2.4     Imaging/procedures:   Xray Result (most recent):  XR CHEST PORTABLE 08/27/2024    Narrative  EXAM:  XR CHEST PORTABLE    INDICATION: Crackles at R lung    COMPARISON: Chest radiograph 8/25/2024    TECHNIQUE: Upright portable chest AP view    FINDINGS: The cardiac silhouette is within normal limits. The pulmonary  vasculature is within normal limits.    The lungs and pleural spaces are clear. The visualized bones and upper abdomen  are age-appropriate.    Impression  No acute process on portable chest.      Electronically signed by Jerri Buchanan             Assessment and Plan     Araceli Segundo is a 63 y.o. female with a PMHx of COPD, tobacco use, HTN, and HLD who is admitted for COPD exacerbation .        COPD Exacerbation  2/4 SIRS: WOB improving. Suspect viral etiology overall. COVID, Flu neg. Troponin 9.6>9.8, . Procal <0.05. 86% RA, improved to >90% on 4L NC. No baseline O2 at home currently. CXR NAP.   - Follow respiratory cultures, MRSA nares  - wean O2 as tolerated               - O2 therapy: target SpO2 88-94%  - IV Solumedrol 40mg q8h then taper  to PO tomorrow per pulm recs  - IV Protonix 40mg daily for SUP  - Duonebs (albuterol + ipratropium) q4hrs   - s/p 7 day doxy course  - Sub home Trelegy for Brovana/pulmicort  - will need home O2, per CM will be provided by [facility] at discharge  - Consult Pulm; appreciate recs   - Resp PT     Hypokalemia  Hypomagnesemia: Resolved.  - Replete prn     Hypertension: improved.  On Lotrel 10-20mg at home.  - Continue Lisinopril 20mg qhs, amlodipine 10mg qhs (sub for Lotrel)  - Will continue to monitor at this time and readjust as BP's trend.     GERD: stable on home Prilosec qd.   - Sub home prilosec for IV Protonix 40 mg QD      Prediabetes: No home medications, lifestyle controlled. Last A1C 5.8% in November 2023.  -follow-up with PCP     Hyperlipidemia: No home medications, lifestyle controlled.   Lab Results   Component Value Date    CHOL 257 (H) 11/08/2023    TRIG 130 11/08/2023    HDL 91 11/08/2023     (H) 11/08/2023    VLDL 26 11/08/2023    CHOLHDLRATIO 2.8 11/08/2023   -follow with PCP       Obesity BMI Body mass index is 31.09 kg/m².  - Encouraging lifestyle modifications and further follow up outpatient.         FEN/GI - Regular diet.   Activity - Ambulate as tolerated  DVT prophylaxis - Lovenox  GI prophylaxis - Protonix  Fall prophylaxis - Not indicated at this time.  Disposition -  Plan to d/c to Home. PT consulted.  Code Status - Full, discussed with patient / caregivers.  Next of Kin Name and Contact Torrie Pitts (Child) [phone] (Mobile)     Patient discussed with Hoa Goodwin MD Alexandra G Ledwith, MD  Family Medicine Resident       For Billing    Chief Complaint   Patient presents with    Shortness of Breath

## 2024-08-29 NOTE — PROGRESS NOTES
Spiritual Health Assessment/Progress Note  [facility]    Rituals, Rites and Sacraments,  ,  ,      Name: Araceli Segundo MRN: [id number]    Age: 63 y.o.     Sex: female   Language: English   Samaritan: Quaker   COPD with acute exacerbation (HCC)     Date: 8/29/2024            Total Time Calculated: 5 min              Spiritual Assessment continued in SF B4 MULTI-SPECIALTY ORTHOPEDICS 1        Referral/Consult From: Clergy/   Encounter Overview/Reason: Rituals, Rites and Sacraments  Service Provided For: Patient    Yani, Belief, Meaning:   Patient is connected with a yani tradition or spiritual practice  Family/Friends No family/friends present      Importance and Influence:  Patient has spiritual/personal beliefs that influence decisions regarding their health  Family/Friends no family/friends present    Community:  Patient Other: unknown  Family/Friends Other: none    Assessment and Plan of Care:     Patient Interventions include: Facilitated expression of thoughts and feelings  Family/Friends Interventions include: Other: none    Patient Plan of Care: Spiritual Care available upon further referral  Family/Friends Plan of Care: Spiritual Care available upon further referral    Electronically signed by Chaplain ADAL on 8/29/2024 at 2:25 PM     [facility] visit.  Mrs. Crowe was sitting up eating lunch.  She declined communion and expressed her disappointment regard some issue regarding her discharge plans.  Active listening offered as she expressed she is leaving on her own terms and when her family can pick her up.    Chaplain So. DEREK Abreu, RN, ACSW, LCSW   Page:  [phone]
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
## 2024-08-29 NOTE — DISCHARGE SUMMARY
Discharge / Transfer / Off-Service Note     Name: Araceli Segundo MRN: [id number]  Sex: Female   YOB: 1960  Age: 63 y.o.  PCP: Ana Phelan DO     Date of admission: 8/25/2024  Date of discharge/transfer: 8/29/2024    Attending physician at admission: Dr. Hoa Goodwin.  Attending physician at discharge/transfer: Hoa Goodwin MD  Resident physician at discharge/transfer: Merissa Anderson MD     Consultants during hospitalization  IP CONSULT TO FAMILY MEDICINE  IP CONSULT TO PULMONOLOGY  IP CONSULT TO CASE MANAGEMENT  IP CONSULT TO SPIRITUAL CARE  IP CONSULT TO CASE MANAGEMENT     Admission diagnoses   Tobacco abuse [Z72.0]  COPD exacerbation (HCC) [J44.1]  COPD with acute exacerbation (HCC) [J44.1]    Recommended follow-up after discharge    1. PCP-Ana Phelan DO  2. Pulmonology for COPD exacerbation follow-up     Things to follow up on with PCP:   -routine hospital follow-up    Medication Changes:     Medication List        START taking these medications      * predniSONE 20 MG tablet  Commonly known as: DELTASONE  Take 2 tablets by mouth daily for 3 days     * predniSONE 10 MG tablet  Commonly known as: DELTASONE  Take 2 tablets by mouth daily for 3 days  Start taking on: September 2, 2024     * predniSONE 10 MG tablet  Commonly known as: DELTASONE  Take 1 tablet by mouth daily for 3 days  Start taking on: September 6, 2024           * This list has 3 medication(s) that are the same as other medications prescribed for you. Read the directions carefully, and ask your doctor or other care provider to review them with you.                CONTINUE taking these medications      acetaminophen 500 MG tablet  Commonly known as: TYLENOL  Take 2 tablets by mouth 2 times daily as needed for Pain     albuterol sulfate  (90 Base) MCG/ACT inhaler  Commonly known as: PROVENTIL;VENTOLIN;PROAIR     amLODIPine-benazepril 10-20 MG per capsule  Commonly known as:  input(s): \"PH\", \"PCO2\", \"PO2\", \"TNIPOC\", \"INR\", \"APTT\", \"TIBC\", \"GLUCPOC\" in the last 72 hours.    Invalid input(s): \"TROIQ\", \"PTP\", \"FE\", \"PSAT\", \"FERR\", \"GLPOC\"    Micro:  No components found for: \"CULT\"    Imaging:  XR CHEST PORTABLE    Result Date: 8/27/2024  EXAM:  XR CHEST PORTABLE INDICATION: Crackles at R lung COMPARISON: Chest radiograph 8/25/2024 TECHNIQUE: Upright portable chest AP view FINDINGS: The cardiac silhouette is within normal limits. The pulmonary vasculature is within normal limits. The lungs and pleural spaces are clear. The visualized bones and upper abdomen are age-appropriate.     No acute process on portable chest. Electronically signed by Jerri Buchanan    XR CHEST PORTABLE    Result Date: 8/25/2024  EXAM: XR CHEST PORTABLE ACC#: [id number] INDICATION: shortness of breath COMPARISON: 12/11/2022 TECHNIQUE: AP portable upright view of the chest. FINDINGS: Lungs are clear. The cardiomediastinal configuration is within normal limits. No acute bony abnormalities.     No acute cardiopulmonary abnormalities. Electronically signed by RANDAL Jarvis    XR WRIST RIGHT (2 VIEWS)    Result Date: 8/2/2024  PA, Lat.     Views personally interpreted demonstrating good alignment of the screw.   No evidence of lucency or fracture today. Moderate thumb CMC arthritic change.    XR HAND LEFT (2 VIEWS)    Result Date: 8/2/2024  PA, Lat.     Views personally interpreted demonstrating appropriate alignment.  No fracture        Diet: Regular diet   Activity:  As tolerated     Disposition: Home with home health    Discharge instructions to patient/family  Please seek medical attention for any new or worsening symptoms particularly fever, chest pain, shortness of breath, abdominal pain, nausea, vomiting    Follow up plans/appointments  Follow-up Information       Follow up With Specialties Details Why Contact LYNN Robles  Follow up on 9/10/2024 [facility] pulmonary follow up set for Tuesday September  10th 2024 at 11:30 AM with Ashley BAILEY at their [facility] location. [place]  [facility]  **NEW LOCATION**  Address:  [address] [place] [address].  Phone: [phone]  Fax: [phone]    Ana Phelan, DO Family Medicine Follow up routine hospital follow-up [address] [place] [address]  [phone]               Merissa Anderson MD  Family Medicine Resident     For Billing    Chief Complaint   Patient presents with    Shortness of Breath       Principal Problem:    COPD with acute exacerbation (HCC)  Active Problems:    COPD exacerbation (HCC)    Tobacco abuse    Essential hypertension  Resolved Problems:    * No resolved hospital problems. *

## 2024-08-29 NOTE — PROGRESS NOTES
Pt ordered for Home O2 evaluation.  Attempted to assess pt x2 and pt refused each attempt.  Spoke with AYAAN Hayes and will use home O2 assessment with Physical Therapy from yesterday if pt to discharge today.  Will follow.

## 2024-08-29 NOTE — CARE COORDINATION
Case Management Discharge Note    Discharge Plan:  DC to home today with an O2 portable tank through [employer].  CM delivered a portable tank at bedside.  Patient is on 2L NC.    Transportation at DC:  Patient stated that her dtr is assisting.    CM met with patient at bedside and discussed DCP updates.  Patient is agreeable to discharging to home today and stated no other needs.    CM called [employer] [phone] and confirmed that a new O2 concentrator was delivered to the home on 8/24/2024, which patient confirmed.  Patient stated that she has a full portable tank at home.  She confirmed that she has the contact info for [employer] and knows to call them if she needs portable tanks.      CM obtained a portable tank from DME closet per [employer] to provide to patient for DC.  [employer] delivery ticket uploaded to [employer].      Patient stated no other needs.  Medical treatment team informed of updates.    ______________________________________  ANDRY Real, RN-CM  [facility]- Care Management  Available via [facility]  8/29/2024  3:07 PM

## 2024-08-29 NOTE — PROGRESS NOTES
Spiritual Health Assessment/Progress Note  [facility]    Advance Care Planning, Initial Encounter,  ,  ,      Name: Araceli Segundo MRN: [id number]    Age: 63 y.o.     Sex: female   Language: English   Mormonism: Anglican   COPD with acute exacerbation (HCC)     Date: 8/29/2024            Total Time Calculated: 49 min              Spiritual Assessment began in [facility] 1        Referral/Consult From: Nurse   Encounter Overview/Reason: Advance Care Planning, Initial Encounter  Service Provided For: Patient    Yani, Belief, Meaning:   Patient identifies as spiritual and is connected with a yani tradition or spiritual practice  Family/Friends No family/friends present      Importance and Influence:  Patient has no beliefs influential to healthcare decision-making identified during this visit  Family/Friends no family/friends present    Community:  Patient feels well-supported. Support system includes: Children and Extended family  Family/Friends Other: Unknown    Assessment and Plan of Care:   Patient Interventions include: Facilitated expression of thoughts and feelings and Assisted in Advance Care Planning conversation  Family/Friends Interventions include: Other: Unknown    Patient Plan of Care: Spiritual Care available upon further referral  Family/Friends Plan of Care: Unknown    Chart review. Received and responded to spiritual consult request for assistance in updating advance medical directive (AMD). Patient is  and has three children, and five grand children. Patient is of the Anglican Pentecostalism. Patient has been attending [employer]. Patient stated her Pentecostalism does not influence her decisions. Patient has a superb support network that entails her kids, son in laws, and friend. Family has visited patient while hospitalization. Patient briefly shared medical issues and historical records. Patient is feeling better today. Provided ministry of presence,

## 2024-08-29 NOTE — PROGRESS NOTES
08/29/24 1142   Resting (Room Air)   SpO2 92   HR 99   During Walk (Room Air)   SpO2 87      Rate of Dyspnea 3   Symptoms Shortness of breath   Comments placed on 2L NC   During Walk (On O2)   SpO2 92      O2 Device Nasal cannula   O2 Flow Rate (l/min) 2 l/min   Need Additional O2 Flow Rate Rows No   Rate of Dyspnea 2   After Walk   SpO2 94      O2 Device Nasal cannula   O2 Flow Rate (l/min) 2 l/min   Comments cleared to walk by RN and PT.  ambulated in hallway on 2L NC.  returned to chair on 2L.   Does the Patient Qualify for Home O2 Yes   Liter Flow on Exertion 2   Does the Patient Need Portable Oxygen Tanks Yes

## 2024-09-18 NOTE — PROGRESS NOTES
Reviewer    PROVIDER RESPONSE TEXT:    This patient is in acute respiratory failure as evidenced by    Query created by: Annabella Vila on 8/29/2024 11:41 PM      Electronically signed by:  Ashley BAILEY 9/18/2024 1:15 PM

## 2024-09-27 ENCOUNTER — HOSPITAL ENCOUNTER (OUTPATIENT)
Facility: HOSPITAL | Age: 64
Discharge: HOME | End: 2024-09-30
Payer: MEDICARE

## 2024-09-27 DIAGNOSIS — R93.89 ABNORMAL CHEST CT: ICD-10-CM

## 2024-09-27 PROCEDURE — 71250 CT THORAX DX C-: CPT

## 2025-03-12 ENCOUNTER — OFFICE VISIT (OUTPATIENT)
Age: 65
End: 2025-03-12
Payer: MEDICARE

## 2025-03-12 VITALS
HEIGHT: 62 IN | SYSTOLIC BLOOD PRESSURE: 129 MMHG | DIASTOLIC BLOOD PRESSURE: 84 MMHG | HEART RATE: 97 BPM | BODY MASS INDEX: 31.1 KG/M2 | TEMPERATURE: 97.8 F | RESPIRATION RATE: 18 BRPM | WEIGHT: 169 LBS | OXYGEN SATURATION: 95 %

## 2025-03-12 DIAGNOSIS — F41.9 ANXIETY AND DEPRESSION: ICD-10-CM

## 2025-03-12 DIAGNOSIS — Z87.19 HISTORY OF DIVERTICULITIS: ICD-10-CM

## 2025-03-12 DIAGNOSIS — M16.9 OSTEOARTHRITIS OF HIP, UNSPECIFIED LATERALITY, UNSPECIFIED OSTEOARTHRITIS TYPE: ICD-10-CM

## 2025-03-12 DIAGNOSIS — J44.1 CHRONIC OBSTRUCTIVE PULMONARY DISEASE WITH (ACUTE) EXACERBATION (HCC): ICD-10-CM

## 2025-03-12 DIAGNOSIS — R31.9 HEMATURIA, UNSPECIFIED TYPE: ICD-10-CM

## 2025-03-12 DIAGNOSIS — R30.0 DYSURIA: Primary | ICD-10-CM

## 2025-03-12 DIAGNOSIS — F32.A ANXIETY AND DEPRESSION: ICD-10-CM

## 2025-03-12 LAB
BILIRUBIN, URINE, POC: NEGATIVE
BLOOD URINE, POC: NORMAL
GLUCOSE URINE, POC: NEGATIVE
KETONES, URINE, POC: NEGATIVE
LEUKOCYTE ESTERASE, URINE, POC: NORMAL
NITRITE, URINE, POC: NEGATIVE
PH, URINE, POC: 5.5 (ref 4.6–8)
PROTEIN,URINE, POC: NORMAL
SPECIFIC GRAVITY, URINE, POC: 1.01 (ref 1–1.03)
URINALYSIS CLARITY, POC: CLEAR
URINALYSIS COLOR, POC: YELLOW
UROBILINOGEN, POC: NORMAL

## 2025-03-12 PROCEDURE — 1123F ACP DISCUSS/DSCN MKR DOCD: CPT | Performed by: FAMILY MEDICINE

## 2025-03-12 PROCEDURE — 4004F PT TOBACCO SCREEN RCVD TLK: CPT | Performed by: FAMILY MEDICINE

## 2025-03-12 PROCEDURE — 1090F PRES/ABSN URINE INCON ASSESS: CPT | Performed by: FAMILY MEDICINE

## 2025-03-12 PROCEDURE — 3017F COLORECTAL CA SCREEN DOC REV: CPT | Performed by: FAMILY MEDICINE

## 2025-03-12 PROCEDURE — 3079F DIAST BP 80-89 MM HG: CPT | Performed by: FAMILY MEDICINE

## 2025-03-12 PROCEDURE — G8417 CALC BMI ABV UP PARAM F/U: HCPCS | Performed by: FAMILY MEDICINE

## 2025-03-12 PROCEDURE — G8400 PT W/DXA NO RESULTS DOC: HCPCS | Performed by: FAMILY MEDICINE

## 2025-03-12 PROCEDURE — 1126F AMNT PAIN NOTED NONE PRSNT: CPT | Performed by: FAMILY MEDICINE

## 2025-03-12 PROCEDURE — 99214 OFFICE O/P EST MOD 30 MIN: CPT | Performed by: FAMILY MEDICINE

## 2025-03-12 PROCEDURE — G8427 DOCREV CUR MEDS BY ELIG CLIN: HCPCS | Performed by: FAMILY MEDICINE

## 2025-03-12 PROCEDURE — 3074F SYST BP LT 130 MM HG: CPT | Performed by: FAMILY MEDICINE

## 2025-03-12 PROCEDURE — 81003 URINALYSIS AUTO W/O SCOPE: CPT | Performed by: FAMILY MEDICINE

## 2025-03-12 PROCEDURE — 3023F SPIROM DOC REV: CPT | Performed by: FAMILY MEDICINE

## 2025-03-12 PROCEDURE — G2211 COMPLEX E/M VISIT ADD ON: HCPCS | Performed by: FAMILY MEDICINE

## 2025-03-12 PROCEDURE — PBSHW AMB POC URINALYSIS DIP STICK AUTO W/O MICRO: Performed by: FAMILY MEDICINE

## 2025-03-12 RX ORDER — CEFDINIR 300 MG/1
300 CAPSULE ORAL 2 TIMES DAILY
Qty: 14 CAPSULE | Refills: 0 | Status: SHIPPED | OUTPATIENT
Start: 2025-03-12 | End: 2025-03-15 | Stop reason: ALTCHOICE

## 2025-03-12 SDOH — ECONOMIC STABILITY: FOOD INSECURITY: WITHIN THE PAST 12 MONTHS, THE FOOD YOU BOUGHT JUST DIDN'T LAST AND YOU DIDN'T HAVE MONEY TO GET MORE.: NEVER TRUE

## 2025-03-12 SDOH — ECONOMIC STABILITY: FOOD INSECURITY: WITHIN THE PAST 12 MONTHS, YOU WORRIED THAT YOUR FOOD WOULD RUN OUT BEFORE YOU GOT MONEY TO BUY MORE.: NEVER TRUE

## 2025-03-12 ASSESSMENT — ANXIETY QUESTIONNAIRES
5. BEING SO RESTLESS THAT IT IS HARD TO SIT STILL: NOT AT ALL
1. FEELING NERVOUS, ANXIOUS, OR ON EDGE: MORE THAN HALF THE DAYS
7. FEELING AFRAID AS IF SOMETHING AWFUL MIGHT HAPPEN: NOT AT ALL
2. NOT BEING ABLE TO STOP OR CONTROL WORRYING: MORE THAN HALF THE DAYS
4. TROUBLE RELAXING: NOT AT ALL
IF YOU CHECKED OFF ANY PROBLEMS ON THIS QUESTIONNAIRE, HOW DIFFICULT HAVE THESE PROBLEMS MADE IT FOR YOU TO DO YOUR WORK, TAKE CARE OF THINGS AT HOME, OR GET ALONG WITH OTHER PEOPLE: SOMEWHAT DIFFICULT
3. WORRYING TOO MUCH ABOUT DIFFERENT THINGS: MORE THAN HALF THE DAYS
GAD7 TOTAL SCORE: 7
6. BECOMING EASILY ANNOYED OR IRRITABLE: SEVERAL DAYS

## 2025-03-12 ASSESSMENT — PATIENT HEALTH QUESTIONNAIRE - PHQ9
SUM OF ALL RESPONSES TO PHQ QUESTIONS 1-9: 10
9. THOUGHTS THAT YOU WOULD BE BETTER OFF DEAD, OR OF HURTING YOURSELF: NOT AT ALL
1. LITTLE INTEREST OR PLEASURE IN DOING THINGS: MORE THAN HALF THE DAYS
2. FEELING DOWN, DEPRESSED OR HOPELESS: MORE THAN HALF THE DAYS
3. TROUBLE FALLING OR STAYING ASLEEP: MORE THAN HALF THE DAYS
SUM OF ALL RESPONSES TO PHQ QUESTIONS 1-9: 10
8. MOVING OR SPEAKING SO SLOWLY THAT OTHER PEOPLE COULD HAVE NOTICED. OR THE OPPOSITE, BEING SO FIGETY OR RESTLESS THAT YOU HAVE BEEN MOVING AROUND A LOT MORE THAN USUAL: NOT AT ALL
6. FEELING BAD ABOUT YOURSELF - OR THAT YOU ARE A FAILURE OR HAVE LET YOURSELF OR YOUR FAMILY DOWN: MORE THAN HALF THE DAYS
SUM OF ALL RESPONSES TO PHQ QUESTIONS 1-9: 10
SUM OF ALL RESPONSES TO PHQ QUESTIONS 1-9: 10
4. FEELING TIRED OR HAVING LITTLE ENERGY: MORE THAN HALF THE DAYS
5. POOR APPETITE OR OVEREATING: NOT AT ALL
7. TROUBLE CONCENTRATING ON THINGS, SUCH AS READING THE NEWSPAPER OR WATCHING TELEVISION: NOT AT ALL
10. IF YOU CHECKED OFF ANY PROBLEMS, HOW DIFFICULT HAVE THESE PROBLEMS MADE IT FOR YOU TO DO YOUR WORK, TAKE CARE OF THINGS AT HOME, OR GET ALONG WITH OTHER PEOPLE: SOMEWHAT DIFFICULT
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
## 2025-03-14 ENCOUNTER — ENROLLMENT (OUTPATIENT)
Dept: PHARMACY | Facility: CLINIC | Age: 65
End: 2025-03-14

## 2025-03-14 LAB
BACTERIA SPEC CULT: NORMAL
CC UR VC: NORMAL
SERVICE CMNT-IMP: NORMAL

## 2025-03-15 ENCOUNTER — HOSPITAL ENCOUNTER (EMERGENCY)
Facility: HOSPITAL | Age: 65
Discharge: HOME | End: 2025-03-15
Attending: STUDENT IN AN ORGANIZED HEALTH CARE EDUCATION/TRAINING PROGRAM
Payer: MEDICARE

## 2025-03-15 ENCOUNTER — APPOINTMENT (OUTPATIENT)
Facility: HOSPITAL | Age: 65
End: 2025-03-15
Payer: MEDICARE

## 2025-03-15 VITALS
RESPIRATION RATE: 20 BRPM | SYSTOLIC BLOOD PRESSURE: 141 MMHG | HEIGHT: 62 IN | DIASTOLIC BLOOD PRESSURE: 94 MMHG | WEIGHT: 167 LBS | BODY MASS INDEX: 30.73 KG/M2 | HEART RATE: 109 BPM | OXYGEN SATURATION: 94 % | TEMPERATURE: 98 F

## 2025-03-15 DIAGNOSIS — K57.32 SIGMOID DIVERTICULITIS: Primary | ICD-10-CM

## 2025-03-15 DIAGNOSIS — M16.10 HIP ARTHRITIS: ICD-10-CM

## 2025-03-15 DIAGNOSIS — K80.20 CALCULUS OF GALLBLADDER WITHOUT CHOLECYSTITIS WITHOUT OBSTRUCTION: ICD-10-CM

## 2025-03-15 LAB
ALBUMIN SERPL-MCNC: 4 G/DL (ref 3.5–5.2)
ALBUMIN/GLOB SERPL: 1.5 (ref 1.1–2.2)
ALP SERPL-CCNC: 129 U/L (ref 35–104)
ALT SERPL-CCNC: 13 U/L (ref 10–35)
ANION GAP SERPL CALC-SCNC: 11 MMOL/L (ref 2–12)
APPEARANCE UR: CLEAR
AST SERPL-CCNC: 18 U/L (ref 10–35)
BACTERIA URNS QL MICRO: NEGATIVE /HPF
BASOPHILS # BLD: 0.08 K/UL (ref 0–0.1)
BASOPHILS NFR BLD: 1.3 % (ref 0–1)
BILIRUB SERPL-MCNC: 0.5 MG/DL (ref 0.2–1)
BILIRUB UR QL: NEGATIVE
BUN SERPL-MCNC: 10 MG/DL (ref 8–23)
BUN/CREAT SERPL: 15 (ref 12–20)
CALCIUM SERPL-MCNC: 9.2 MG/DL (ref 8.8–10.2)
CHLORIDE SERPL-SCNC: 106 MMOL/L (ref 98–107)
CO2 SERPL-SCNC: 26 MMOL/L (ref 22–29)
COLOR UR: ABNORMAL
CREAT SERPL-MCNC: 0.68 MG/DL (ref 0.5–0.9)
DIFFERENTIAL METHOD BLD: ABNORMAL
EOSINOPHIL # BLD: 0.16 K/UL (ref 0–0.4)
EOSINOPHIL NFR BLD: 2.6 % (ref 0–7)
EPITH CASTS URNS QL MICRO: ABNORMAL /LPF
ERYTHROCYTE [DISTWIDTH] IN BLOOD BY AUTOMATED COUNT: 13.9 % (ref 11.5–14.5)
GLOBULIN SER CALC-MCNC: 2.6 G/DL (ref 2–4)
GLUCOSE SERPL-MCNC: 121 MG/DL (ref 65–100)
GLUCOSE UR STRIP.AUTO-MCNC: NEGATIVE MG/DL
HCT VFR BLD AUTO: 40.8 % (ref 35–47)
HGB BLD-MCNC: 13.7 G/DL (ref 11.5–16)
HGB UR QL STRIP: ABNORMAL
IMM GRANULOCYTES # BLD AUTO: 0.01 K/UL (ref 0–0.04)
IMM GRANULOCYTES NFR BLD AUTO: 0.2 % (ref 0–0.5)
KETONES UR QL STRIP.AUTO: NEGATIVE MG/DL
LEUKOCYTE ESTERASE UR QL STRIP.AUTO: NEGATIVE
LIPASE SERPL-CCNC: 17 U/L (ref 13–60)
LYMPHOCYTES # BLD: 1.33 K/UL (ref 0.8–3.5)
LYMPHOCYTES NFR BLD: 21.6 % (ref 12–49)
MCH RBC QN AUTO: 28.4 PG (ref 26–34)
MCHC RBC AUTO-ENTMCNC: 33.6 G/DL (ref 30–36.5)
MCV RBC AUTO: 84.5 FL (ref 80–99)
MONOCYTES # BLD: 0.45 K/UL (ref 0–1)
MONOCYTES NFR BLD: 7.3 % (ref 5–13)
NEUTS SEG # BLD: 4.13 K/UL (ref 1.8–8)
NEUTS SEG NFR BLD: 67 % (ref 32–75)
NITRITE UR QL STRIP.AUTO: NEGATIVE
NRBC # BLD: 0 K/UL (ref 0–0.01)
NRBC BLD-RTO: 0 PER 100 WBC
PH UR STRIP: 6.5 (ref 5–8)
PLATELET # BLD AUTO: 216 K/UL (ref 150–400)
PMV BLD AUTO: 9.4 FL (ref 8.9–12.9)
POTASSIUM SERPL-SCNC: 4 MMOL/L (ref 3.5–5.1)
PROT SERPL-MCNC: 6.6 G/DL (ref 6.4–8.3)
PROT UR STRIP-MCNC: NEGATIVE MG/DL
RBC # BLD AUTO: 4.83 M/UL (ref 3.8–5.2)
RBC #/AREA URNS HPF: ABNORMAL /HPF
SODIUM SERPL-SCNC: 143 MMOL/L (ref 136–145)
SP GR UR REFRACTOMETRY: 1.01 (ref 1–1.03)
SPECIMEN HOLD: NORMAL
UROBILINOGEN UR QL STRIP.AUTO: 0.2 EU/DL (ref 0.2–1)
WBC # BLD AUTO: 6.2 K/UL (ref 3.6–11)
WBC URNS QL MICRO: ABNORMAL /HPF (ref 0–4)

## 2025-03-15 PROCEDURE — 6360000004 HC RX CONTRAST MEDICATION: Performed by: STUDENT IN AN ORGANIZED HEALTH CARE EDUCATION/TRAINING PROGRAM

## 2025-03-15 PROCEDURE — 36415 COLL VENOUS BLD VENIPUNCTURE: CPT

## 2025-03-15 PROCEDURE — 74177 CT ABD & PELVIS W/CONTRAST: CPT

## 2025-03-15 PROCEDURE — 80053 COMPREHEN METABOLIC PANEL: CPT

## 2025-03-15 PROCEDURE — 85025 COMPLETE CBC W/AUTO DIFF WBC: CPT

## 2025-03-15 PROCEDURE — 99285 EMERGENCY DEPT VISIT HI MDM: CPT

## 2025-03-15 PROCEDURE — 81001 URINALYSIS AUTO W/SCOPE: CPT

## 2025-03-15 PROCEDURE — 96374 THER/PROPH/DIAG INJ IV PUSH: CPT

## 2025-03-15 PROCEDURE — 83690 ASSAY OF LIPASE: CPT

## 2025-03-15 PROCEDURE — 6360000002 HC RX W HCPCS: Performed by: STUDENT IN AN ORGANIZED HEALTH CARE EDUCATION/TRAINING PROGRAM

## 2025-03-15 RX ORDER — DICLOFENAC SODIUM 75 MG/1
75 TABLET, DELAYED RELEASE ORAL 2 TIMES DAILY PRN
Qty: 20 TABLET | Refills: 0 | Status: SHIPPED | OUTPATIENT
Start: 2025-03-15 | End: 2025-03-25

## 2025-03-15 RX ORDER — KETOROLAC TROMETHAMINE 30 MG/ML
15 INJECTION, SOLUTION INTRAMUSCULAR; INTRAVENOUS ONCE
Status: COMPLETED | OUTPATIENT
Start: 2025-03-15 | End: 2025-03-15

## 2025-03-15 RX ORDER — ACETAMINOPHEN 500 MG
1000 TABLET ORAL EVERY 6 HOURS PRN
COMMUNITY
Start: 2025-03-15

## 2025-03-15 RX ORDER — IOPAMIDOL 755 MG/ML
100 INJECTION, SOLUTION INTRAVASCULAR
Status: COMPLETED | OUTPATIENT
Start: 2025-03-15 | End: 2025-03-15

## 2025-03-15 RX ADMIN — IOPAMIDOL 100 ML: 755 INJECTION, SOLUTION INTRAVENOUS at 07:48

## 2025-03-15 RX ADMIN — KETOROLAC TROMETHAMINE 15 MG: 30 INJECTION, SOLUTION INTRAMUSCULAR at 07:07

## 2025-03-15 ASSESSMENT — PAIN - FUNCTIONAL ASSESSMENT: PAIN_FUNCTIONAL_ASSESSMENT: 0-10

## 2025-03-15 ASSESSMENT — PAIN SCALES - GENERAL: PAINLEVEL_OUTOF10: 6

## 2025-03-15 NOTE — ED TRIAGE NOTES
Pt ambulatory into ED with cc L side flank pain with nausea and constipation. Pt reports increased urination about a week ago, was seen and treated for UTI. Pt with hx of diverticulitis and reports the symptoms feel the same. Denies pain meds x2 days. Reports 6/10 pain at this time.

## 2025-03-15 NOTE — ED NOTES
-- DO NOT REPLY / DO NOT REPLY ALL --  -- This inbox is not monitored. If this was sent to the wrong provider or department, reroute message to P ECO Reroute pool. --  -- Message is from Engagement Center Operations (ECO) --    General Patient Message: Pharmacist from [facility] is calling to clarify directions on difluprednate (Durezol) 0.05 % ophthalmic emulsion   Caller Information       Contact Date/Time Type Contact Phone/Fax    12/31/2024 12:56 PM CST Phone (Incoming) Tobi Bart [phone]            Alternative phone number: n/a     Can a detailed message be left? N/a   Patient has been advised the message will be addressed within 2-3 business days.              Copied from CRM #[id number]. Topic: MW Medication/Rx - MW Patient Calling for RX Needs  >> Dec 31, 2024 12:58 PM Aileen MOTA wrote:  [facility] pharmacy called with Question about medication during after hrs.   Selected 'Wrap Up CRM' and created new Telephone Encounter after clicking 'Convert to Clinical Call'. Selected reason for call 'Medication Issue'. Sent Med template and routed as routine priority per Care Site Dept KB page for Med Refill After Hrs support to appropriate clinical pool. Readback full message.   Discharge instruction reviewed by DUNIA Barahona with the patient.  The patient verbalized understanding. Patient provided with AVS.      Patient is ambulatory and steady gait upon discharge. Patient is AAOX4, breathing even and unlabored, skin warm and dry, skin intact.    Patient mobility status  with no difficulty. Provider aware     Patient left ED via Discharge Method: ambulatory to Home.    Opportunity for questions and clarification provided.     Patient given 0 paper scripts.

## 2025-03-15 NOTE — ED PROVIDER NOTES
Resp: 20   Temp: 98 °F (36.7 °C)   TempSrc: Oral   SpO2: 94%   Weight: 75.8 kg (167 lb)   Height: 1.575 m (5' 2\")       Medical Decision Making  # Left lower quadrant pain  -Differential includes UTI, pyelo, bowel obstruction, diverticulitis, perforation.  Plan for labs CT urinalysis, pain medication as needed.    Amount and/or Complexity of Data Reviewed  External Data Reviewed: notes.  Labs: ordered. Decision-making details documented in ED Course.  Radiology: ordered.    Risk  OTC drugs.  Prescription drug management.    Signed out to Dr. Hubbard pending CT scan and reassessment.    REASSESSMENT     ED Course as of 03/16/25 0123   Sat Mar 15, 2025   0722 WBC: 6.2 [RS]   0722 BUN,BUNPL: 10 [RS]   0722 Creatinine: 0.68 [RS]   0722 Lipase: 17 [RS]   0722 Total Bilirubin: 0.5 [RS]   0722 AST: 18 [RS]   0722 ALT: 13 [RS]      ED Course User Index  [RS] Yemi Melgoza MD       CONSULTS:  None    PROCEDURES:  Procedures    FINAL IMPRESSION      1. Sigmoid diverticulitis    2. Calculus of gallbladder without cholecystitis without obstruction    3. Hip arthritis          DISPOSITION/PLAN   DISPOSITION Decision To Discharge 03/15/2025 09:39:04 AM    (Please note that portions of this note were completed with a voice recognition program.  Efforts were made to edit the dictations but occasionally words are mis-transcribed.)    Lawrence Lopez MD (electronically signed)  Emergency Attending Physician      Lawrence Lopez MD  03/16/25 0124

## 2025-03-15 NOTE — ED NOTES
ED SIGN OUT NOTE  Care assumed at [facility] 7:05 AM EDT    Patient was signed out to me by Dr. Lopez.     Patient is awaiting labs and CT scan.    BP (!) 141/94   Pulse (!) 109   Temp 98 °F (36.7 °C) (Oral)   Resp 20   Ht 1.575 m (5' 2\")   Wt 75.8 kg (167 lb)   SpO2 94%   BMI 30.54 kg/m²     Labs Reviewed   CBC WITH AUTO DIFFERENTIAL - Abnormal; Notable for the following components:       Result Value    Basophils % 1.3 (*)     All other components within normal limits   COMPREHENSIVE METABOLIC PANEL - Abnormal; Notable for the following components:    Glucose 121 (*)     Alk Phosphatase 129 (*)     All other components within normal limits   URINALYSIS WITH MICROSCOPIC - Abnormal; Notable for the following components:    Blood, Urine SMALL (*)     All other components within normal limits   URINE CULTURE HOLD SAMPLE   LIPASE     CT ABDOMEN PELVIS W IV CONTRAST Additional Contrast? None   Final Result   1.  Acute sigmoid colonic diverticulitis. Trace pelvic free fluid is likely   reactive.   2.  Gallbladder stone versus polyp as described above. Correlate clinically and   consider dedicated gallbladder ultrasound for further evaluation.      Electronically signed by STEFANY LEUNG          ED Course as of 03/15/25 1041   Sat Mar 15, 2025   0722 WBC: 6.2 [RS]   0722 BUN,BUNPL: 10 [RS]   0722 Creatinine: 0.68 [RS]   0722 Lipase: 17 [RS]   0722 Total Bilirubin: 0.5 [RS]   0722 AST: 18 [RS]   0722 ALT: 13 [RS]      ED Course User Index  [RS] Yemi Melgoza MD       Diagnosis:   1. Sigmoid diverticulitis    2. Calculus of gallbladder without cholecystitis without obstruction    3. Hip arthritis        Disposition:   Decision To Discharge 03/15/2025 09:39:04 AM    Plan:   Patient reports a recent history of upper quadrant abdominal pain rating to the flank but denies any right upper quadrant pain or postprandial pain at this time.  We discussed possible cholelithiasis with recurrent

## 2025-03-17 ENCOUNTER — TELEPHONE (OUTPATIENT)
Dept: PHARMACY | Facility: CLINIC | Age: 65
End: 2025-03-17
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
## 2025-03-23 ENCOUNTER — RESULTS FOLLOW-UP (OUTPATIENT)
Age: 65
End: 2025-03-23

## 2025-03-23 PROBLEM — M16.9 HIP OSTEOARTHRITIS: Status: ACTIVE | Noted: 2025-03-23

## 2025-03-28 NOTE — ED PROVIDER NOTES
Psychiatric:         Mood and Affect: Mood normal.         DIAGNOSTIC RESULTS     RADIOLOGY:   Non-plain film images such as CT, Ultrasound and MRI are read by the radiologist. Plain radiographic images are visualized and preliminarily interpreted by the emergency physician with the below findings:        Interpretation per the Radiologist below, if available at the time of this note:    Vascular duplex lower extremity venous left    (Results Pending)         ED BEDSIDE ULTRASOUND:   Performed by ED Physician    LABS:  Labs Reviewed - No data to display    All other labs were unremarkable or not returned as of this dictation.    EMERGENCY DEPARTMENT COURSE and DIFFERENTIAL DIAGNOSIS/MDM:   Medical Decision Making  Duplex negative for DVT.  Discussed my clinical impression(s), any labs and/or radiology results with the patient. I answered any questions and addressed any concerns. Discussed the importance of following up with their primary care physician and/or specialist(s). Discussed signs or symptoms that would warrant return back to the ER for further evaluation. The patient is agreeable with discharge.         EKG: All EKG's are interpreted by the Emergency Department Physician who either signs or Co-signs this chart in the absence of a cardiologist.         CRITICAL CARE TIME   Total Critical Care time was 0 minutes, excluding separately reportable procedures.  There was a high probability of clinically significant/life threatening deterioration in the patient's condition which required my urgent intervention.     CONSULTS:  None    PROCEDURES:  Unless otherwise noted below, none     Procedures    FINAL IMPRESSION    No diagnosis found.      DISPOSITION/PLAN   DISPOSITION      PATIENT REFERRED TO:  No follow-up provider specified.    DISCHARGE MEDICATIONS:  New Prescriptions    No medications on file     Controlled Substances Monitoring:          No data to display                (Please note that portions of 
son and niece/patient

## 2025-03-31 NOTE — TELEPHONE ENCOUNTER
Ana Phelan, DO, please see below - would patient benefit from reordering DEXA?  64yof, no previous DEXA noted  Appears DEXA you ordered 23 has now , before patient completed     If appropriate, please order and have your staff notify patient.     Thank you,  Nancy Quarles, PharmD, [facility]  Population Health Pharmacist  [facility] Clinical Pharmacy  Department, toll free: [phone], option 1     =========================================================    Noted encounter remains viewed in provider inbasket.  Will also send patient beRecruitedt message.       For Pharmacy Admin Tracking Only    Program: Pencil You In  CPA in place:  No  Recommendation Provided To: Provider: 1 via Note to Provider  Intervention Detail: Lab(s) Ordered  Intervention Accepted By: Provider: 0  Gap Closed?: No   Time Spent (min): 15

## 2025-04-15 ENCOUNTER — TELEPHONE (OUTPATIENT)
Age: 65
End: 2025-04-15

## 2025-04-15 NOTE — TELEPHONE ENCOUNTER
Patient name and  verified before conversation. Called patient, listened to concerns: frequent urination and minimal vaginal pain. Scheduled 2025 @10:20 am

## 2025-04-17 ENCOUNTER — OFFICE VISIT (OUTPATIENT)
Age: 65
End: 2025-04-17
Payer: MEDICARE

## 2025-04-17 VITALS
SYSTOLIC BLOOD PRESSURE: 143 MMHG | WEIGHT: 171.2 LBS | DIASTOLIC BLOOD PRESSURE: 88 MMHG | OXYGEN SATURATION: 95 % | HEART RATE: 89 BPM | RESPIRATION RATE: 18 BRPM | HEIGHT: 62 IN | TEMPERATURE: 98.2 F | BODY MASS INDEX: 31.5 KG/M2

## 2025-04-17 DIAGNOSIS — R35.0 URINARY FREQUENCY: Primary | ICD-10-CM

## 2025-04-17 DIAGNOSIS — I10 ESSENTIAL (PRIMARY) HYPERTENSION: ICD-10-CM

## 2025-04-17 DIAGNOSIS — R79.9 ABNORMAL FINDING OF BLOOD CHEMISTRY, UNSPECIFIED: ICD-10-CM

## 2025-04-17 DIAGNOSIS — J44.9 CHRONIC OBSTRUCTIVE PULMONARY DISEASE, UNSPECIFIED COPD TYPE (HCC): ICD-10-CM

## 2025-04-17 DIAGNOSIS — R60.9 SWELLING: ICD-10-CM

## 2025-04-17 DIAGNOSIS — J30.2 SEASONAL ALLERGIES: ICD-10-CM

## 2025-04-17 DIAGNOSIS — Z87.19 HISTORY OF DIVERTICULITIS: ICD-10-CM

## 2025-04-17 DIAGNOSIS — R73.9 HYPERGLYCEMIA: ICD-10-CM

## 2025-04-17 DIAGNOSIS — R31.9 HEMATURIA, UNSPECIFIED TYPE: ICD-10-CM

## 2025-04-17 LAB
ALBUMIN SERPL-MCNC: 3.8 G/DL (ref 3.5–5)
ALBUMIN/GLOB SERPL: 1.4 (ref 1.1–2.2)
ALP SERPL-CCNC: 119 U/L (ref 45–117)
ALT SERPL-CCNC: 22 U/L (ref 12–78)
ANION GAP SERPL CALC-SCNC: 2 MMOL/L (ref 2–12)
APPEARANCE UR: CLEAR
AST SERPL-CCNC: 17 U/L (ref 15–37)
BACTERIA URNS QL MICRO: ABNORMAL /HPF
BASOPHILS # BLD: 0.09 K/UL (ref 0–0.1)
BASOPHILS NFR BLD: 1.8 % (ref 0–1)
BILIRUB SERPL-MCNC: 0.5 MG/DL (ref 0.2–1)
BILIRUB UR QL: NEGATIVE
BILIRUBIN, URINE, POC: NEGATIVE
BLOOD URINE, POC: NORMAL
BUN SERPL-MCNC: 17 MG/DL (ref 6–20)
BUN/CREAT SERPL: 20 (ref 12–20)
CALCIUM SERPL-MCNC: 9.4 MG/DL (ref 8.5–10.1)
CHLORIDE SERPL-SCNC: 107 MMOL/L (ref 97–108)
CHOLEST SERPL-MCNC: 234 MG/DL
CO2 SERPL-SCNC: 31 MMOL/L (ref 21–32)
COLOR UR: ABNORMAL
CREAT SERPL-MCNC: 0.83 MG/DL (ref 0.55–1.02)
DIFFERENTIAL METHOD BLD: ABNORMAL
EOSINOPHIL # BLD: 0.23 K/UL (ref 0–0.4)
EOSINOPHIL NFR BLD: 4.6 % (ref 0–7)
EPITH CASTS URNS QL MICRO: ABNORMAL /LPF
ERYTHROCYTE [DISTWIDTH] IN BLOOD BY AUTOMATED COUNT: 13.6 % (ref 11.5–14.5)
EST. AVERAGE GLUCOSE BLD GHB EST-MCNC: 123 MG/DL
GLOBULIN SER CALC-MCNC: 2.8 G/DL (ref 2–4)
GLUCOSE SERPL-MCNC: 114 MG/DL (ref 65–100)
GLUCOSE UR STRIP.AUTO-MCNC: NEGATIVE MG/DL
GLUCOSE URINE, POC: NEGATIVE
HBA1C MFR BLD: 5.9 % (ref 4–5.6)
HCT VFR BLD AUTO: 42.7 % (ref 35–47)
HDLC SERPL-MCNC: 70 MG/DL
HDLC SERPL: 3.3 (ref 0–5)
HGB BLD-MCNC: 13.7 G/DL (ref 11.5–16)
HGB UR QL STRIP: ABNORMAL
IMM GRANULOCYTES # BLD AUTO: 0.02 K/UL (ref 0–0.04)
IMM GRANULOCYTES NFR BLD AUTO: 0.4 % (ref 0–0.5)
KETONES UR QL STRIP.AUTO: NEGATIVE MG/DL
KETONES, URINE, POC: NEGATIVE
LDLC SERPL CALC-MCNC: 141.8 MG/DL (ref 0–100)
LEUKOCYTE ESTERASE UR QL STRIP.AUTO: ABNORMAL
LEUKOCYTE ESTERASE, URINE, POC: NORMAL
LYMPHOCYTES # BLD: 1.6 K/UL (ref 0.8–3.5)
LYMPHOCYTES NFR BLD: 31.8 % (ref 12–49)
MCH RBC QN AUTO: 28.2 PG (ref 26–34)
MCHC RBC AUTO-ENTMCNC: 32.1 G/DL (ref 30–36.5)
MCV RBC AUTO: 88 FL (ref 80–99)
MONOCYTES # BLD: 0.38 K/UL (ref 0–1)
MONOCYTES NFR BLD: 7.6 % (ref 5–13)
NEUTS SEG # BLD: 2.71 K/UL (ref 1.8–8)
NEUTS SEG NFR BLD: 53.8 % (ref 32–75)
NITRITE UR QL STRIP.AUTO: NEGATIVE
NITRITE, URINE, POC: NEGATIVE
NRBC # BLD: 0 K/UL (ref 0–0.01)
NRBC BLD-RTO: 0 PER 100 WBC
PH UR STRIP: 6 (ref 5–8)
PH, URINE, POC: 6 (ref 4.6–8)
PLATELET # BLD AUTO: 229 K/UL (ref 150–400)
PMV BLD AUTO: 10 FL (ref 8.9–12.9)
POTASSIUM SERPL-SCNC: 3.8 MMOL/L (ref 3.5–5.1)
PROT SERPL-MCNC: 6.6 G/DL (ref 6.4–8.2)
PROT UR STRIP-MCNC: NEGATIVE MG/DL
PROTEIN,URINE, POC: NORMAL
RBC # BLD AUTO: 4.85 M/UL (ref 3.8–5.2)
RBC #/AREA URNS HPF: ABNORMAL /HPF (ref 0–5)
SODIUM SERPL-SCNC: 140 MMOL/L (ref 136–145)
SP GR UR REFRACTOMETRY: 1.02 (ref 1–1.03)
SPECIFIC GRAVITY, URINE, POC: 1.01 (ref 1–1.03)
SPECIMEN HOLD: NORMAL
TRIGL SERPL-MCNC: 111 MG/DL
TSH SERPL DL<=0.05 MIU/L-ACNC: 1.33 UIU/ML (ref 0.36–3.74)
URINALYSIS CLARITY, POC: NORMAL
URINALYSIS COLOR, POC: YELLOW
UROBILINOGEN UR QL STRIP.AUTO: 0.2 EU/DL (ref 0.2–1)
UROBILINOGEN, POC: NORMAL
VLDLC SERPL CALC-MCNC: 22.2 MG/DL
WBC # BLD AUTO: 5 K/UL (ref 3.6–11)
WBC URNS QL MICRO: ABNORMAL /HPF (ref 0–4)

## 2025-04-17 PROCEDURE — 1090F PRES/ABSN URINE INCON ASSESS: CPT | Performed by: FAMILY MEDICINE

## 2025-04-17 PROCEDURE — G8400 PT W/DXA NO RESULTS DOC: HCPCS | Performed by: FAMILY MEDICINE

## 2025-04-17 PROCEDURE — 99215 OFFICE O/P EST HI 40 MIN: CPT | Performed by: FAMILY MEDICINE

## 2025-04-17 PROCEDURE — 3079F DIAST BP 80-89 MM HG: CPT | Performed by: FAMILY MEDICINE

## 2025-04-17 PROCEDURE — G8427 DOCREV CUR MEDS BY ELIG CLIN: HCPCS | Performed by: FAMILY MEDICINE

## 2025-04-17 PROCEDURE — 3077F SYST BP >= 140 MM HG: CPT | Performed by: FAMILY MEDICINE

## 2025-04-17 PROCEDURE — 3023F SPIROM DOC REV: CPT | Performed by: FAMILY MEDICINE

## 2025-04-17 PROCEDURE — G8417 CALC BMI ABV UP PARAM F/U: HCPCS | Performed by: FAMILY MEDICINE

## 2025-04-17 PROCEDURE — 3017F COLORECTAL CA SCREEN DOC REV: CPT | Performed by: FAMILY MEDICINE

## 2025-04-17 PROCEDURE — 1159F MED LIST DOCD IN RCRD: CPT | Performed by: FAMILY MEDICINE

## 2025-04-17 PROCEDURE — 1123F ACP DISCUSS/DSCN MKR DOCD: CPT | Performed by: FAMILY MEDICINE

## 2025-04-17 PROCEDURE — 4004F PT TOBACCO SCREEN RCVD TLK: CPT | Performed by: FAMILY MEDICINE

## 2025-04-17 PROCEDURE — PBSHW AMB POC URINALYSIS DIP STICK AUTO W/O MICRO: Performed by: FAMILY MEDICINE

## 2025-04-17 PROCEDURE — 1125F AMNT PAIN NOTED PAIN PRSNT: CPT | Performed by: FAMILY MEDICINE

## 2025-04-17 PROCEDURE — 81003 URINALYSIS AUTO W/O SCOPE: CPT | Performed by: FAMILY MEDICINE

## 2025-04-17 RX ORDER — AZELASTINE 1 MG/ML
1 SPRAY, METERED NASAL 2 TIMES DAILY
Qty: 60 ML | Refills: 1 | Status: SHIPPED | OUTPATIENT
Start: 2025-04-17

## 2025-04-17 RX ORDER — CIPROFLOXACIN 500 MG/1
500 TABLET, FILM COATED ORAL 2 TIMES DAILY
Qty: 10 TABLET | Refills: 0 | Status: SHIPPED | OUTPATIENT
Start: 2025-04-17 | End: 2025-04-22

## 2025-04-17 RX ORDER — ALBUTEROL SULFATE 90 UG/1
2 AEROSOL, METERED RESPIRATORY (INHALATION) EVERY 6 HOURS PRN
Qty: 18 G | Refills: 3 | Status: SHIPPED | OUTPATIENT
Start: 2025-04-17

## 2025-04-17 NOTE — PROGRESS NOTES
[facility] Office Visit     Assessment/ Plan: Araceli Segundo is a 64 y.o. female presenting for:    Urinary Frequency - Recent history of same, no bacteria on culture and ended up with diverticulitis. U/A with blood and trace LE, will send for micro and culture. Empirically treating with cipro, cautioned on side effects (including rare tendon rupture, has tolerated in the past). Encouraged fluids.   -- check A1C given history of preDM, CMP  -- wonder about volume overload contributing - last ECHO reviewed from 2020 was okay but in setting of ongoing issues with SOB, could certainly be heart failure component, no crackles/wheezing on exam today  consider checking BNP and ECHO at f/up    COPD  Seasonal Allergies - Chronic, worsening. Follows with pulmonology. Worsening rhinorrhea, will trial nose spray. Refill of inhalers provided.     Hypertension - Chronic, uncontrolled. Is usually more elevated here related to pain, stress. Should be on amlodipine 10 - benazepril 20mg. Last fill report was October 2024 so worried about compliance though could certainly been inaccurate. Has struggled financially recently, referred to CM. Resent refill (usually filled by cardiologist).   BP Readings from Last 3 Encounters:   04/17/25 (!) 143/88   03/15/25 (!) 141/94   03/12/25 129/84   -- CMP, urine, lipid panel, A1C today     30 minutes were spent on the day of this encounter both with the patient and in related activities including chart review, care coordination and counseling.     Patient instructions were discussed and/or provided in the AVS. The patient understands and agrees to the plan.    RETURN TO CARE: 1 month   Future Appointments   Date Time Provider Department Center   5/14/2025  8:00 AM Ana Phelan DO FP [facility] ECC DEP     Subjective:  Chief Complaint   Patient presents with    Urinary Frequency    Leg Swelling     Pt coming in for frequent urination. Patient states she is

## 2025-04-17 NOTE — PROGRESS NOTES
Araceli Segundo is a 64 y.o. female    Identified pt with two pt identifiers(name and ). Reviewed record in preparation for visit and have obtained necessary documentation.    Chief Complaint   Patient presents with    Urinary Frequency    Leg Swelling     Pt coming in for frequent urination. Patient states she is peeing every 10-15 minutes. Patient also has swelling in her feet, legs and ankles. Patient said she did go to hospital 3 days after her last visit here and was diagnosed with diverticulitis.       \"Have you been to the ER, urgent care clinic since your last visit?  Hospitalized since your last visit?\"    no    “Have you seen or consulted any other health care providers outside of [facility] since your last visit?”    NO    “Have you had a colorectal cancer screening such as a colonoscopy/FIT/Cologuard?    -no    Date of last Colonoscopy: 2015  No cologuard on file  No FIT/FOBT on file   No flexible sigmoidoscopy on file        Have you had a mammogram?”   NO    No breast cancer screening on file            Vitals:    25 1020 25 1029   BP: (!) 154/100 (!) 143/88   BP Site: Right Upper Arm Left Upper Arm   Patient Position: Sitting Sitting   BP Cuff Size: Medium Adult Large Adult   Pulse: 89    Resp: 18    Temp: 98.2 °F (36.8 °C)    TempSrc: Oral    SpO2: 95%    Weight: 77.7 kg (171 lb 3.2 oz)    Height: 1.575 m (5' 2\")             Health Maintenance Due   Topic Date Due    Hepatitis C screen  Never done    Pneumococcal 50+ years Vaccine (1 of 2 - PCV) Never done    Breast cancer screen  Never done    Shingles vaccine (1 of 2) Never done    DEXA (modify frequency per FRAX score)  Never done    Respiratory Syncytial Virus (RSV) Pregnant or age 60 yrs+ (1 - Risk 60-74 years 1-dose series) Never done    Colorectal Cancer Screen  2020    DTaP/Tdap/Td vaccine (2 - Td or Tdap) 2021    COVID-19 Vaccine (3 - - season) 2024    A1C test (Diabetic or

## 2025-04-18 LAB
BACTERIA SPEC CULT: NORMAL
CC UR VC: NORMAL
SERVICE CMNT-IMP: NORMAL

## 2025-04-24 ENCOUNTER — TELEPHONE (OUTPATIENT)
Age: 65
End: 2025-04-24

## 2025-04-24 NOTE — TELEPHONE ENCOUNTER
Pt requesting call back from Dr. Phelan to discuss the fact that she \"doesn't feel any better\" since her last appt on 4/17. Reports constantly going to BR and left ankle still swollen. Pt was offered an appt however she declined citing she has already had two recent appts relating to not feeling well - not convinced that a another office visit is going to make a difference. Call [phone].

## 2025-04-29 ENCOUNTER — RESULTS FOLLOW-UP (OUTPATIENT)
Age: 65
End: 2025-04-29

## 2025-04-29 DIAGNOSIS — R79.89 ELEVATED SERUM CREATININE: ICD-10-CM

## 2025-04-29 DIAGNOSIS — E78.5 DYSLIPIDEMIA: Primary | ICD-10-CM

## 2025-04-29 RX ORDER — AMLODIPINE AND BENAZEPRIL HYDROCHLORIDE 10; 20 MG/1; MG/1
1 CAPSULE ORAL DAILY
Qty: 90 CAPSULE | Refills: 3 | Status: SHIPPED | OUTPATIENT
Start: 2025-04-29

## 2025-05-14 ENCOUNTER — OFFICE VISIT (OUTPATIENT)
Age: 65
End: 2025-05-14
Payer: MEDICARE

## 2025-05-14 VITALS
SYSTOLIC BLOOD PRESSURE: 102 MMHG | TEMPERATURE: 98.2 F | HEIGHT: 62 IN | DIASTOLIC BLOOD PRESSURE: 74 MMHG | RESPIRATION RATE: 18 BRPM | HEART RATE: 93 BPM | OXYGEN SATURATION: 96 % | WEIGHT: 170 LBS | BODY MASS INDEX: 31.28 KG/M2

## 2025-05-14 DIAGNOSIS — E78.5 DYSLIPIDEMIA: ICD-10-CM

## 2025-05-14 DIAGNOSIS — Z86.0100 HISTORY OF COLON POLYPS: ICD-10-CM

## 2025-05-14 DIAGNOSIS — Z72.0 TOBACCO ABUSE: ICD-10-CM

## 2025-05-14 DIAGNOSIS — Z00.00 MEDICARE ANNUAL WELLNESS VISIT, SUBSEQUENT: Primary | ICD-10-CM

## 2025-05-14 DIAGNOSIS — Z12.11 SCREENING FOR COLON CANCER: ICD-10-CM

## 2025-05-14 DIAGNOSIS — R73.03 PRE-DIABETES: ICD-10-CM

## 2025-05-14 DIAGNOSIS — Z12.31 ENCOUNTER FOR SCREENING MAMMOGRAM FOR MALIGNANT NEOPLASM OF BREAST: ICD-10-CM

## 2025-05-14 DIAGNOSIS — J44.1 COPD EXACERBATION (HCC): ICD-10-CM

## 2025-05-14 PROCEDURE — 1159F MED LIST DOCD IN RCRD: CPT | Performed by: FAMILY MEDICINE

## 2025-05-14 PROCEDURE — 1123F ACP DISCUSS/DSCN MKR DOCD: CPT | Performed by: FAMILY MEDICINE

## 2025-05-14 PROCEDURE — 3078F DIAST BP <80 MM HG: CPT | Performed by: FAMILY MEDICINE

## 2025-05-14 PROCEDURE — G8400 PT W/DXA NO RESULTS DOC: HCPCS | Performed by: FAMILY MEDICINE

## 2025-05-14 PROCEDURE — G8417 CALC BMI ABV UP PARAM F/U: HCPCS | Performed by: FAMILY MEDICINE

## 2025-05-14 PROCEDURE — G2211 COMPLEX E/M VISIT ADD ON: HCPCS | Performed by: FAMILY MEDICINE

## 2025-05-14 PROCEDURE — 3017F COLORECTAL CA SCREEN DOC REV: CPT | Performed by: FAMILY MEDICINE

## 2025-05-14 PROCEDURE — 3074F SYST BP LT 130 MM HG: CPT | Performed by: FAMILY MEDICINE

## 2025-05-14 PROCEDURE — 99213 OFFICE O/P EST LOW 20 MIN: CPT | Performed by: FAMILY MEDICINE

## 2025-05-14 PROCEDURE — 4004F PT TOBACCO SCREEN RCVD TLK: CPT | Performed by: FAMILY MEDICINE

## 2025-05-14 PROCEDURE — 3023F SPIROM DOC REV: CPT | Performed by: FAMILY MEDICINE

## 2025-05-14 PROCEDURE — 1160F RVW MEDS BY RX/DR IN RCRD: CPT | Performed by: FAMILY MEDICINE

## 2025-05-14 PROCEDURE — 1126F AMNT PAIN NOTED NONE PRSNT: CPT | Performed by: FAMILY MEDICINE

## 2025-05-14 PROCEDURE — G0439 PPPS, SUBSEQ VISIT: HCPCS | Performed by: FAMILY MEDICINE

## 2025-05-14 PROCEDURE — 1090F PRES/ABSN URINE INCON ASSESS: CPT | Performed by: FAMILY MEDICINE

## 2025-05-14 PROCEDURE — G8427 DOCREV CUR MEDS BY ELIG CLIN: HCPCS | Performed by: FAMILY MEDICINE

## 2025-05-14 RX ORDER — DOXYCYCLINE HYCLATE 100 MG
100 TABLET ORAL 2 TIMES DAILY
Qty: 10 TABLET | Refills: 0 | Status: SHIPPED | OUTPATIENT
Start: 2025-05-14 | End: 2025-05-19

## 2025-05-14 RX ORDER — ROSUVASTATIN CALCIUM 5 MG/1
5 TABLET, COATED ORAL NIGHTLY
Qty: 90 TABLET | Refills: 3 | Status: SHIPPED | OUTPATIENT
Start: 2025-05-14

## 2025-05-14 RX ORDER — PREDNISONE 10 MG/1
TABLET ORAL
Qty: 30 TABLET | Refills: 0 | Status: SHIPPED | OUTPATIENT
Start: 2025-05-14

## 2025-05-14 SDOH — HEALTH STABILITY: PHYSICAL HEALTH: ON AVERAGE, HOW MANY MINUTES DO YOU ENGAGE IN EXERCISE AT THIS LEVEL?: 0 MIN

## 2025-05-14 SDOH — HEALTH STABILITY: PHYSICAL HEALTH: ON AVERAGE, HOW MANY DAYS PER WEEK DO YOU ENGAGE IN MODERATE TO STRENUOUS EXERCISE (LIKE A BRISK WALK)?: 0 DAYS

## 2025-05-14 ASSESSMENT — PATIENT HEALTH QUESTIONNAIRE - PHQ9
SUM OF ALL RESPONSES TO PHQ QUESTIONS 1-9: 2
2. FEELING DOWN, DEPRESSED OR HOPELESS: SEVERAL DAYS
1. LITTLE INTEREST OR PLEASURE IN DOING THINGS: SEVERAL DAYS

## 2025-05-14 ASSESSMENT — LIFESTYLE VARIABLES
HOW OFTEN DO YOU HAVE SIX OR MORE DRINKS ON ONE OCCASION: 1
HOW MANY STANDARD DRINKS CONTAINING ALCOHOL DO YOU HAVE ON A TYPICAL DAY: PATIENT DOES NOT DRINK
HOW OFTEN DO YOU HAVE A DRINK CONTAINING ALCOHOL: 1
HOW MANY STANDARD DRINKS CONTAINING ALCOHOL DO YOU HAVE ON A TYPICAL DAY: 0
HOW OFTEN DO YOU HAVE A DRINK CONTAINING ALCOHOL: NEVER

## 2025-05-14 NOTE — PROGRESS NOTES
Araceli Segundo is a 64 y.o. female    Identified pt with two pt identifiers(name and ). Reviewed record in preparation for visit and have obtained necessary documentation.    Chief Complaint   Patient presents with    Medicare AWV     Patient is coming in for a medicare wellness exam and follow up on mood. Patient also started with and SOB yesterday. No other concerns.        \"Have you been to the ER, urgent care clinic since your last visit?  Hospitalized since your last visit?\"    NO    “Have you seen or consulted any other health care providers outside of [facility] since your last visit?”    NO      Vitals:    25 0752   BP: 102/74   BP Site: Right Upper Arm   Patient Position: Sitting   BP Cuff Size: Large Adult   Pulse: 93   Resp: 18   Temp: 98.2 °F (36.8 °C)   TempSrc: Oral   SpO2: 96%   Weight: 77.1 kg (170 lb)   Height: 1.575 m (5' 2\")            Health Maintenance Due   Topic Date Due    Hepatitis C screen  Never done    Pneumococcal 50+ years Vaccine (1 of 2 - PCV) Never done    Breast cancer screen  Never done    Shingles vaccine (1 of 2) Never done    DEXA (modify frequency per FRAX score)  Never done    Respiratory Syncytial Virus (RSV) Pregnant or age 60 yrs+ (1 - Risk 60-74 years 1-dose series) Never done    Colorectal Cancer Screen  2020    DTaP/Tdap/Td vaccine (2 - Td or Tdap) 2021    COVID-19 Vaccine (3 - - season) 2024       Click Here for Release of Records Request     Medication Reconciliation completed, changes noted.  Please  Update medication list.

## 2025-05-14 NOTE — PROGRESS NOTES
Medicare Annual Wellness Visit    Araceli Segundo is here for Medicare AWV (Patient is coming in for a medicare wellness exam and follow up on mood. Patient also started with and SOB yesterday. No other concerns. )    Assessment & Plan   Medicare annual wellness visit, subsequent  Health Maintenance Due   Topic Date Due    Hepatitis C screen  Never done    Pneumococcal 50+ years Vaccine (1 of 2 - PCV) Never done    Breast cancer screen  Never done    Shingles vaccine (1 of 2) Never done    DEXA (modify frequency per FRAX score)  Never done    Respiratory Syncytial Virus (RSV) Pregnant or age 60 yrs+ (1 - Risk 60-74 years 1-dose series) Never done    Colorectal Cancer Screen  06/17/2020    DTaP/Tdap/Td vaccine (2 - Td or Tdap) 03/16/2021    COVID-19 Vaccine (3 - 2024-25 season) 09/01/2024     Dyslipidemia  Tobacco Use  Prediabetes - Counseled, reviewed risks/benefits of statin and amenable to starting. Reviewed common side effects. The 10-year ASCVD risk score (Gian DK, et al., 2019) is: 9.3%  -     rosuvastatin (CRESTOR) 5 MG tablet; Take 1 tablet by mouth nightly, Disp-90 tablet, R-3Normal    COPD exacerbation (HCC) - Acute on chronic, no hypoxia at this time. Feels like increased sputum, cough, SOB. Has follow-up with pulmonology coming up. Will route chart. Reviewed return precautions.   -     doxycycline hyclate (VIBRA-TABS) 100 MG tablet; Take 1 tablet by mouth 2 times daily for 5 days, Disp-10 tablet, R-0Normal  -     predniSONE (DELTASONE) 10 MG tablet; Take 4 tablets for 3 days, 3 tablets for 3 days, 2 tablets for 3 days, 1 tablet for 3 days., Disp-30 tablet, R-0Normal    Nocturia  Urinary Frequency - Discussed could be undiagnosed KACEY, advised to ask pulmonology about sleep study. Has seen urogyn and cee.     Screening for colon cancer  -     [facility] - Sy Givens MD, [facility] (Khloe Mariano)    Encounter for screening mammogram for malignant neoplasm of breast  -     [facility]

## 2025-05-14 NOTE — PATIENT INSTRUCTIONS
benefits include a comprehensive review of your medical history including lifestyle, illnesses that may run in your family, and various assessments and screenings as appropriate.  After reviewing your medical record and screening and assessments performed today your provider may have ordered immunizations, labs, imaging, and/or referrals for you.  A list of these orders (if applicable) as well as your Preventive Care list are included within your After Visit Summary for your review.

## 2025-07-28 ENCOUNTER — COMMUNITY OUTREACH (OUTPATIENT)
Age: 65
End: 2025-07-28

## 2025-09-02 ENCOUNTER — OFFICE VISIT (OUTPATIENT)
Age: 65
End: 2025-09-02
Payer: MEDICARE

## 2025-09-02 ENCOUNTER — ANCILLARY PROCEDURE (OUTPATIENT)
Age: 65
End: 2025-09-02
Payer: MEDICARE

## 2025-09-02 VITALS
SYSTOLIC BLOOD PRESSURE: 130 MMHG | RESPIRATION RATE: 22 BRPM | TEMPERATURE: 97.9 F | HEIGHT: 62 IN | WEIGHT: 180 LBS | OXYGEN SATURATION: 96 % | BODY MASS INDEX: 33.13 KG/M2 | HEART RATE: 94 BPM | DIASTOLIC BLOOD PRESSURE: 80 MMHG

## 2025-09-02 DIAGNOSIS — R06.02 SOB (SHORTNESS OF BREATH): ICD-10-CM

## 2025-09-02 DIAGNOSIS — R35.89 POLYURIA: ICD-10-CM

## 2025-09-02 DIAGNOSIS — R35.0 URINARY FREQUENCY: Primary | ICD-10-CM

## 2025-09-02 DIAGNOSIS — R73.03 PRE-DIABETES: ICD-10-CM

## 2025-09-02 DIAGNOSIS — R14.0 ABDOMINAL DISTENTION: ICD-10-CM

## 2025-09-02 LAB
ALBUMIN SERPL-MCNC: 3.8 G/DL (ref 3.5–5.2)
ALBUMIN/GLOB SERPL: 1.4 (ref 1.1–2.2)
ALP SERPL-CCNC: 126 U/L (ref 35–104)
ALT SERPL-CCNC: 18 U/L (ref 10–35)
ANION GAP SERPL CALC-SCNC: 11 MMOL/L (ref 2–14)
AST SERPL-CCNC: 19 U/L (ref 10–35)
BILIRUB SERPL-MCNC: 0.4 MG/DL (ref 0–1.2)
BILIRUBIN, URINE, POC: NEGATIVE
BLOOD URINE, POC: NORMAL
BUN SERPL-MCNC: 14 MG/DL (ref 8–23)
BUN/CREAT SERPL: 19 (ref 12–20)
CALCIUM SERPL-MCNC: 9.5 MG/DL (ref 8.8–10.2)
CHLORIDE SERPL-SCNC: 103 MMOL/L (ref 98–107)
CO2 SERPL-SCNC: 27 MMOL/L (ref 20–29)
CREAT SERPL-MCNC: 0.74 MG/DL (ref 0.6–1)
ERYTHROCYTE [DISTWIDTH] IN BLOOD BY AUTOMATED COUNT: 13.5 % (ref 11.5–14.5)
EST. AVERAGE GLUCOSE BLD GHB EST-MCNC: 117 MG/DL
GLOBULIN SER CALC-MCNC: 2.8 G/DL (ref 2–4)
GLUCOSE SERPL-MCNC: 117 MG/DL (ref 65–100)
GLUCOSE URINE, POC: NEGATIVE
HBA1C MFR BLD: 5.7 % (ref 4–5.6)
HCT VFR BLD AUTO: 42.2 % (ref 35–47)
HGB BLD-MCNC: 13.6 G/DL (ref 11.5–16)
KETONES, URINE, POC: NEGATIVE
LEUKOCYTE ESTERASE, URINE, POC: NEGATIVE
MCH RBC QN AUTO: 28.5 PG (ref 26–34)
MCHC RBC AUTO-ENTMCNC: 32.2 G/DL (ref 30–36.5)
MCV RBC AUTO: 88.3 FL (ref 80–99)
NITRITE, URINE, POC: NEGATIVE
NRBC # BLD: 0 K/UL (ref 0–0.01)
NRBC BLD-RTO: 0 PER 100 WBC
NT PRO BNP: 63 PG/ML (ref 0–125)
PH, URINE, POC: 6 (ref 4.6–8)
PLATELET # BLD AUTO: 244 K/UL (ref 150–400)
PMV BLD AUTO: 9.9 FL (ref 8.9–12.9)
POTASSIUM SERPL-SCNC: 3.9 MMOL/L (ref 3.5–5.1)
PROT SERPL-MCNC: 6.6 G/DL (ref 6.4–8.3)
PROTEIN,URINE, POC: NEGATIVE
RBC # BLD AUTO: 4.78 M/UL (ref 3.8–5.2)
SODIUM SERPL-SCNC: 142 MMOL/L (ref 136–145)
SPECIFIC GRAVITY, URINE, POC: 1 (ref 1–1.03)
URINALYSIS CLARITY, POC: CLEAR
URINALYSIS COLOR, POC: YELLOW
UROBILINOGEN, POC: NORMAL
WBC # BLD AUTO: 4.7 K/UL (ref 3.6–11)

## 2025-09-02 PROCEDURE — G8417 CALC BMI ABV UP PARAM F/U: HCPCS

## 2025-09-02 PROCEDURE — 81003 URINALYSIS AUTO W/O SCOPE: CPT

## 2025-09-02 PROCEDURE — 3079F DIAST BP 80-89 MM HG: CPT

## 2025-09-02 PROCEDURE — 71046 X-RAY EXAM CHEST 2 VIEWS: CPT

## 2025-09-02 PROCEDURE — 3017F COLORECTAL CA SCREEN DOC REV: CPT

## 2025-09-02 PROCEDURE — 4004F PT TOBACCO SCREEN RCVD TLK: CPT

## 2025-09-02 PROCEDURE — 3075F SYST BP GE 130 - 139MM HG: CPT

## 2025-09-02 PROCEDURE — 99214 OFFICE O/P EST MOD 30 MIN: CPT

## 2025-09-02 PROCEDURE — PBSHW AMB POC URINALYSIS DIP STICK AUTO W/O MICRO

## 2025-09-02 PROCEDURE — G8427 DOCREV CUR MEDS BY ELIG CLIN: HCPCS

## 2025-09-02 PROCEDURE — 1090F PRES/ABSN URINE INCON ASSESS: CPT

## 2025-09-02 PROCEDURE — 1123F ACP DISCUSS/DSCN MKR DOCD: CPT

## 2025-09-02 PROCEDURE — G8400 PT W/DXA NO RESULTS DOC: HCPCS

## 2025-09-02 PROCEDURE — 1159F MED LIST DOCD IN RCRD: CPT

## 2025-09-02 ASSESSMENT — PATIENT HEALTH QUESTIONNAIRE - PHQ9
2. FEELING DOWN, DEPRESSED OR HOPELESS: SEVERAL DAYS
10. IF YOU CHECKED OFF ANY PROBLEMS, HOW DIFFICULT HAVE THESE PROBLEMS MADE IT FOR YOU TO DO YOUR WORK, TAKE CARE OF THINGS AT HOME, OR GET ALONG WITH OTHER PEOPLE: NOT DIFFICULT AT ALL
8. MOVING OR SPEAKING SO SLOWLY THAT OTHER PEOPLE COULD HAVE NOTICED. OR THE OPPOSITE, BEING SO FIGETY OR RESTLESS THAT YOU HAVE BEEN MOVING AROUND A LOT MORE THAN USUAL: NOT AT ALL
9. THOUGHTS THAT YOU WOULD BE BETTER OFF DEAD, OR OF HURTING YOURSELF: NOT AT ALL
SUM OF ALL RESPONSES TO PHQ QUESTIONS 1-9: 4
3. TROUBLE FALLING OR STAYING ASLEEP: SEVERAL DAYS
4. FEELING TIRED OR HAVING LITTLE ENERGY: SEVERAL DAYS
6. FEELING BAD ABOUT YOURSELF - OR THAT YOU ARE A FAILURE OR HAVE LET YOURSELF OR YOUR FAMILY DOWN: NOT AT ALL
1. LITTLE INTEREST OR PLEASURE IN DOING THINGS: SEVERAL DAYS
5. POOR APPETITE OR OVEREATING: NOT AT ALL
SUM OF ALL RESPONSES TO PHQ QUESTIONS 1-9: 4
7. TROUBLE CONCENTRATING ON THINGS, SUCH AS READING THE NEWSPAPER OR WATCHING TELEVISION: NOT AT ALL

## 2025-09-04 DIAGNOSIS — M79.89 LEFT LEG SWELLING: Primary | ICD-10-CM

## 2025-09-04 LAB
BACTERIA SPEC CULT: NORMAL
CC UR VC: NORMAL
SERVICE CMNT-IMP: NORMAL

## 2025-09-04 RX ORDER — FUROSEMIDE 20 MG/1
20 TABLET ORAL DAILY
Qty: 10 TABLET | Refills: 0 | Status: SHIPPED | OUTPATIENT
Start: 2025-09-04

## (undated) DEVICE — SUTURE MONOCRYL SZ 4-0 L27IN ABSRB UD L19MM PS-2 1/2 CIR PRIM Y426H

## (undated) DEVICE — BANDAGE COBAN 4 IN COMPR W4INXL5YD FOAM COHESIVE QUIK STK SELF ADH SFT

## (undated) DEVICE — .035" X 5.75" ST GUIDE WIRE: Brand: ACUMED

## (undated) DEVICE — SUTURE VICRYL + SZ 3-0 L27IN ABSRB UD L26MM SH 1/2 CIR VCP416H

## (undated) DEVICE — GLOVE ORANGE PI 7   MSG9070

## (undated) DEVICE — .045" X 6" ST GUIDE WIRE: Brand: ACUMED

## (undated) DEVICE — MINI ACUTRAK 2® DRILL, LONG: Brand: ACUMED

## (undated) DEVICE — GLOVE SURG SZ 75 L12IN FNGR THK79MIL GRN LTX FREE

## (undated) DEVICE — GLOVE SURG SZ 85 L12IN FNGR THK79MIL GRN LTX FREE

## (undated) DEVICE — DRAPE C ARM W/ PLT PROTCT NEO

## (undated) DEVICE — SPLINT CAST W4XL15IN GRN STRENGTH PLSTR OF PARIS FAST SET

## (undated) DEVICE — DRAPE,U/ SHT,SPLIT,PLAS,STERIL: Brand: MEDLINE

## (undated) DEVICE — HAND-SFMCASU: Brand: MEDLINE INDUSTRIES, INC.

## (undated) DEVICE — ZIMMER® STERILE DISPOSABLE TOURNIQUET CUFF WITH PROTECTIVE SLEEVE AND PLC, DUAL PORT, SINGLE BLADDER, 18 IN. (46 CM)

## (undated) DEVICE — SOLUTION IRRIG 1000ML STRL H2O USP PLAS POUR BTL

## (undated) DEVICE — LIQUIBAND RAPID ADHESIVE 36/CS 0.8ML: Brand: MEDLINE

## (undated) DEVICE — BLADE OPHTH 180DEG CUT SURF BLU STR SHRP DBL BVL GRINDLESS

## (undated) DEVICE — PADDING,UNDERCAST,COTTON, 4"X4YD STERILE: Brand: MEDLINE

## (undated) DEVICE — GLOVE SURG SZ 8 CRM LTX FREE POLYISOPRENE POLYMER BEAD ANTI

## (undated) DEVICE — GOWN,SIRUS,NONRNF,SETINSLV,2XL,18/CS: Brand: MEDLINE